# Patient Record
Sex: MALE | Race: WHITE | Employment: OTHER | URBAN - METROPOLITAN AREA
[De-identification: names, ages, dates, MRNs, and addresses within clinical notes are randomized per-mention and may not be internally consistent; named-entity substitution may affect disease eponyms.]

---

## 2023-09-19 ENCOUNTER — HOSPITAL ENCOUNTER (EMERGENCY)
Age: 73
Discharge: ANOTHER HOSPITAL | End: 2023-09-19

## 2023-09-19 ENCOUNTER — HOSPITAL ENCOUNTER (EMERGENCY)
Age: 73
Discharge: HOME | End: 2023-09-20
Attending: EMERGENCY MEDICINE
Payer: MEDICARE

## 2023-09-19 ENCOUNTER — APPOINTMENT (OUTPATIENT)
Dept: CT IMAGING | Age: 73
End: 2023-09-19
Payer: MEDICARE

## 2023-09-19 ENCOUNTER — APPOINTMENT (OUTPATIENT)
Dept: GENERAL RADIOLOGY | Age: 73
End: 2023-09-19
Payer: MEDICARE

## 2023-09-19 VITALS
TEMPERATURE: 97.8 F | RESPIRATION RATE: 16 BRPM | DIASTOLIC BLOOD PRESSURE: 90 MMHG | HEART RATE: 80 BPM | OXYGEN SATURATION: 95 % | SYSTOLIC BLOOD PRESSURE: 137 MMHG

## 2023-09-19 VITALS
BODY MASS INDEX: 29.16 KG/M2 | RESPIRATION RATE: 20 BRPM | SYSTOLIC BLOOD PRESSURE: 156 MMHG | HEART RATE: 74 BPM | TEMPERATURE: 97.9 F | DIASTOLIC BLOOD PRESSURE: 81 MMHG | WEIGHT: 220 LBS | HEIGHT: 73 IN | OXYGEN SATURATION: 100 %

## 2023-09-19 DIAGNOSIS — R59.1 LYMPHADENOPATHY: ICD-10-CM

## 2023-09-19 DIAGNOSIS — S09.90XA INJURY OF HEAD, INITIAL ENCOUNTER: Primary | ICD-10-CM

## 2023-09-19 DIAGNOSIS — S01.01XA LACERATION OF SCALP, INITIAL ENCOUNTER: ICD-10-CM

## 2023-09-19 LAB
ALBUMIN SERPL-MCNC: 4.4 G/DL (ref 3.5–5.2)
ALP SERPL-CCNC: 84 U/L (ref 40–129)
ALT SERPL-CCNC: 26 U/L (ref 0–40)
ANION GAP SERPL CALCULATED.3IONS-SCNC: 13 MMOL/L (ref 7–16)
AST SERPL-CCNC: 22 U/L (ref 0–39)
BASOPHILS # BLD: 0.03 K/UL (ref 0–0.2)
BASOPHILS NFR BLD: 0 % (ref 0–2)
BILIRUB SERPL-MCNC: 0.6 MG/DL (ref 0–1.2)
BUN SERPL-MCNC: 20 MG/DL (ref 6–23)
CALCIUM SERPL-MCNC: 8.8 MG/DL (ref 8.6–10.2)
CHLORIDE SERPL-SCNC: 107 MMOL/L (ref 98–107)
CO2 SERPL-SCNC: 22 MMOL/L (ref 22–29)
CREAT SERPL-MCNC: 1.3 MG/DL (ref 0.7–1.2)
EOSINOPHIL # BLD: 0.06 K/UL (ref 0.05–0.5)
EOSINOPHILS RELATIVE PERCENT: 1 % (ref 0–6)
ERYTHROCYTE [DISTWIDTH] IN BLOOD BY AUTOMATED COUNT: 13.4 % (ref 11.5–15)
GFR SERPL CREATININE-BSD FRML MDRD: 56 ML/MIN/1.73M2
GLUCOSE SERPL-MCNC: 235 MG/DL (ref 74–99)
HCT VFR BLD AUTO: 41.3 % (ref 37–54)
HGB BLD-MCNC: 14.1 G/DL (ref 12.5–16.5)
IMM GRANULOCYTES # BLD AUTO: 0.07 K/UL (ref 0–0.58)
IMM GRANULOCYTES NFR BLD: 1 % (ref 0–5)
INR PPP: 1
LYMPHOCYTES NFR BLD: 0.77 K/UL (ref 1.5–4)
LYMPHOCYTES RELATIVE PERCENT: 8 % (ref 20–42)
MCH RBC QN AUTO: 28.8 PG (ref 26–35)
MCHC RBC AUTO-ENTMCNC: 34.1 G/DL (ref 32–34.5)
MCV RBC AUTO: 84.3 FL (ref 80–99.9)
MONOCYTES NFR BLD: 0.6 K/UL (ref 0.1–0.95)
MONOCYTES NFR BLD: 7 % (ref 2–12)
NEUTROPHILS NFR BLD: 83 % (ref 43–80)
NEUTS SEG NFR BLD: 7.7 K/UL (ref 1.8–7.3)
PLATELET # BLD AUTO: 174 K/UL (ref 130–450)
PMV BLD AUTO: 10.1 FL (ref 7–12)
POTASSIUM SERPL-SCNC: 3.9 MMOL/L (ref 3.5–5)
PROT SERPL-MCNC: 6.7 G/DL (ref 6.4–8.3)
PROTHROMBIN TIME: 11.3 SEC (ref 9.3–12.4)
RBC # BLD AUTO: 4.9 M/UL (ref 3.8–5.8)
SODIUM SERPL-SCNC: 142 MMOL/L (ref 132–146)
WBC OTHER # BLD: 9.2 K/UL (ref 4.5–11.5)

## 2023-09-19 PROCEDURE — 2580000003 HC RX 258

## 2023-09-19 PROCEDURE — 73030 X-RAY EXAM OF SHOULDER: CPT

## 2023-09-19 PROCEDURE — 96374 THER/PROPH/DIAG INJ IV PUSH: CPT

## 2023-09-19 PROCEDURE — 70450 CT HEAD/BRAIN W/O DYE: CPT

## 2023-09-19 PROCEDURE — 6360000002 HC RX W HCPCS

## 2023-09-19 PROCEDURE — 80053 COMPREHEN METABOLIC PANEL: CPT

## 2023-09-19 PROCEDURE — 85025 COMPLETE CBC W/AUTO DIFF WBC: CPT

## 2023-09-19 PROCEDURE — 90471 IMMUNIZATION ADMIN: CPT

## 2023-09-19 PROCEDURE — 6360000004 HC RX CONTRAST MEDICATION: Performed by: RADIOLOGY

## 2023-09-19 PROCEDURE — 2500000003 HC RX 250 WO HCPCS

## 2023-09-19 PROCEDURE — 72128 CT CHEST SPINE W/O DYE: CPT

## 2023-09-19 PROCEDURE — 71260 CT THORAX DX C+: CPT

## 2023-09-19 PROCEDURE — 90714 TD VACC NO PRESV 7 YRS+ IM: CPT

## 2023-09-19 PROCEDURE — 72131 CT LUMBAR SPINE W/O DYE: CPT

## 2023-09-19 PROCEDURE — 85610 PROTHROMBIN TIME: CPT

## 2023-09-19 PROCEDURE — 99285 EMERGENCY DEPT VISIT HI MDM: CPT

## 2023-09-19 PROCEDURE — 4500000002 HC ER NO CHARGE

## 2023-09-19 PROCEDURE — 96375 TX/PRO/DX INJ NEW DRUG ADDON: CPT

## 2023-09-19 PROCEDURE — 12032 INTMD RPR S/A/T/EXT 2.6-7.5: CPT

## 2023-09-19 PROCEDURE — 6360000002 HC RX W HCPCS: Performed by: EMERGENCY MEDICINE

## 2023-09-19 PROCEDURE — 6370000000 HC RX 637 (ALT 250 FOR IP)

## 2023-09-19 PROCEDURE — 72125 CT NECK SPINE W/O DYE: CPT

## 2023-09-19 RX ORDER — LIDOCAINE HYDROCHLORIDE AND EPINEPHRINE 10; 10 MG/ML; UG/ML
20 INJECTION, SOLUTION INFILTRATION; PERINEURAL ONCE
Status: COMPLETED | OUTPATIENT
Start: 2023-09-19 | End: 2023-09-19

## 2023-09-19 RX ORDER — CEFDINIR 300 MG/1
300 CAPSULE ORAL 2 TIMES DAILY
Qty: 14 CAPSULE | Refills: 0 | Status: SHIPPED | OUTPATIENT
Start: 2023-09-19 | End: 2023-09-26

## 2023-09-19 RX ORDER — OXYCODONE HYDROCHLORIDE AND ACETAMINOPHEN 5; 325 MG/1; MG/1
1 TABLET ORAL ONCE
Status: COMPLETED | OUTPATIENT
Start: 2023-09-19 | End: 2023-09-19

## 2023-09-19 RX ORDER — ONDANSETRON 4 MG/1
4 TABLET, ORALLY DISINTEGRATING ORAL 3 TIMES DAILY PRN
Qty: 21 TABLET | Refills: 0 | Status: SHIPPED | OUTPATIENT
Start: 2023-09-19

## 2023-09-19 RX ORDER — LIDOCAINE HYDROCHLORIDE 10 MG/ML
20 INJECTION, SOLUTION INFILTRATION; PERINEURAL ONCE
Status: DISCONTINUED | OUTPATIENT
Start: 2023-09-19 | End: 2023-09-20 | Stop reason: HOSPADM

## 2023-09-19 RX ORDER — FENTANYL CITRATE 0.05 MG/ML
50 INJECTION, SOLUTION INTRAMUSCULAR; INTRAVENOUS ONCE
Status: COMPLETED | OUTPATIENT
Start: 2023-09-19 | End: 2023-09-19

## 2023-09-19 RX ORDER — MORPHINE SULFATE 4 MG/ML
4 INJECTION, SOLUTION INTRAMUSCULAR; INTRAVENOUS
Status: COMPLETED | OUTPATIENT
Start: 2023-09-19 | End: 2023-09-19

## 2023-09-19 RX ORDER — ACETAMINOPHEN 500 MG
1000 TABLET ORAL
Status: COMPLETED | OUTPATIENT
Start: 2023-09-19 | End: 2023-09-19

## 2023-09-19 RX ORDER — OXYCODONE HYDROCHLORIDE AND ACETAMINOPHEN 5; 325 MG/1; MG/1
1 TABLET ORAL EVERY 6 HOURS PRN
Qty: 12 TABLET | Refills: 0 | Status: SHIPPED | OUTPATIENT
Start: 2023-09-19 | End: 2023-09-22

## 2023-09-19 RX ADMIN — FENTANYL CITRATE 50 MCG: 0.05 INJECTION, SOLUTION INTRAMUSCULAR; INTRAVENOUS at 16:15

## 2023-09-19 RX ADMIN — CLOSTRIDIUM TETANI TOXOID ANTIGEN (FORMALDEHYDE INACTIVATED) AND CORYNEBACTERIUM DIPHTHERIAE TOXOID ANTIGEN (FORMALDEHYDE INACTIVATED) 0.5 ML: 5; 2 INJECTION, SUSPENSION INTRAMUSCULAR at 16:16

## 2023-09-19 RX ADMIN — MORPHINE SULFATE 4 MG: 4 INJECTION, SOLUTION INTRAMUSCULAR; INTRAVENOUS at 17:32

## 2023-09-19 RX ADMIN — IOPAMIDOL 75 ML: 755 INJECTION, SOLUTION INTRAVENOUS at 17:53

## 2023-09-19 RX ADMIN — OXYCODONE AND ACETAMINOPHEN 1 TABLET: 5; 325 TABLET ORAL at 19:13

## 2023-09-19 RX ADMIN — LIDOCAINE HYDROCHLORIDE,EPINEPHRINE BITARTRATE 20 ML: 10; .01 INJECTION, SOLUTION INFILTRATION; PERINEURAL at 19:14

## 2023-09-19 RX ADMIN — WATER 2000 MG: 1 INJECTION INTRAMUSCULAR; INTRAVENOUS; SUBCUTANEOUS at 22:54

## 2023-09-19 RX ADMIN — ACETAMINOPHEN 1000 MG: 500 TABLET ORAL at 19:13

## 2023-09-19 ASSESSMENT — PAIN SCALES - GENERAL
PAINLEVEL_OUTOF10: 7

## 2023-09-19 ASSESSMENT — PAIN DESCRIPTION - ORIENTATION
ORIENTATION: LEFT
ORIENTATION: LEFT

## 2023-09-19 ASSESSMENT — PAIN DESCRIPTION - LOCATION
LOCATION: HEAD;SHOULDER
LOCATION: SHOULDER;HEAD

## 2023-09-19 ASSESSMENT — PAIN - FUNCTIONAL ASSESSMENT: PAIN_FUNCTIONAL_ASSESSMENT: 0-10

## 2023-09-19 NOTE — ED NOTES
Evaluated by NP at reception desk, [phone] called for transport to hospital.     Augie Batista LPN  11/75/42 0230

## 2023-09-19 NOTE — ED PROVIDER NOTES
Arrived by private vehicle he said he was lifting up a building with a machine and the building came down and hit him on the left side of the head he has a laceration on the left side of the head with active bleeding. He said he has a headache in that side. He said the building was about 8 feet up in the air and it came down and hit him on the side of the head he said the roof hit him. Nikhil Better he is also having pain in the left shoulder. He said that he does not have any neck pain. This happened about 30 minutes ago. I did evaluate him in the room with this type of trauma I did tell the patient that he is going to need scanned and further evaluation that what we can do here in urgent care. He is agreeable to ambulance transfer. Patient was transferred via EMS to [facility] emergency department for evaluation. Did call the ED attending Dr. Conchita Calle and told him about this patient being transferred from here to ED.      Jaylan Meth, APRN - CNP  09/19/23 0024 [facility]  09/19/23 7503

## 2023-09-19 NOTE — ED NOTES
Name: Lilia Santos  : 1950  MRN: [id number]    Date:  23    Time: 4:33 PM EDT    Benefits of immediately proceeding with Radiology exam(s) without pre-testing outweigh the risks or are not indicated as specified below and therefore the following is/are being waived:    [] Pregnancy test   [] Patients LMP on-time and regular.   [] Patient had Tubal Ligation or has other Contraception Device. [] Patient  is Menopausal or Premenarcheal.    [] Patient had Full or Partial Hysterectomy. [] Protocol for Iodine allergy    [] MRI Questionnaire     [x] BUN/Creatinine   [] Patient age w/no hx of renal dysfunction. [] Patient on Dialysis. [] Recent Normal Labs.   Electronically signed by Dandre Irwin DO on 23 at 4:33 PM EDT               Dandre Irwin DO  [phone]

## 2023-09-20 NOTE — ED PROVIDER NOTES
[address]        Pt Name: Leeanne Osullivan  MRN: [id number]  [address] [facility] [place] 1950  Date of evaluation: 9/19/2023  Provider: Hua Stone DO  PCP: No primary care provider on file. Note Started: 10:10 PM EDT 9/19/23    CHIEF COMPLAINT       Chief Complaint   Patient presents with    Laceration     Laceration to top of head, pt had piece of wooden roof fall on head, bleeding controlled at traige, A+Ox4, denies LOC, denies thinners, c-collar in place       HISTORY OF PRESENT ILLNESS: 1 or more Elements        Limitations to history : None    Leeanne Osullivan is a 68 y.o. male who presents for evaluation after a head injury. He states a piece of a wooden roof fell and struck him in the head. Denies LOC. Denies anticoagulants. He initially drove himself to urgent care but they were concerned about his lacerations they sent him here by ambulance for further evaluation. He was placed in a c-collar by EMS. He does complain of left shoulder pain. Unknown last tetanus. Nursing Notes were all reviewed and agreed with or any disagreements were addressed in the HPI. REVIEW OF EXTERNAL NOTE :       Reviewed urgent care note from today. Chart Review/External Note Review    Last Echo reviewed by Me:  No results found for: \"LVEF\", \"LVEFMODE\"          Controlled Substance Monitoring:    Acute and Chronic Pain Monitoring:        No data to display                    REVIEW OF SYSTEMS :      Positives and Pertinent negatives as per HPI. SURGICAL HISTORY     Past Surgical History:   Procedure Laterality Date    COLONOSCOPY      COLONOSCOPY  3/21/13    COLONOSCOPY  04/27/2017    2 polyps  Dr Mary Wolf    ENDOSCOPY, COLON, DIAGNOSTIC      EYE SURGERY Bilateral 2012 / 2013?     cataract extraction w lens implants    TOTAL KNEE ARTHROPLASTY Left 09/07/2016    TOTAL KNEE ARTHROPLASTY Right 12/02/2016       CURRENTMEDICATIONS

## 2023-09-20 NOTE — DISCHARGE INSTRUCTIONS
DISCHARGE INSTRUCTIONS  Call Dr. Naila Hendrickson up your medication tomorrow     Even though you have been discharged from the Emergency Department, there are several things that you should do to ensure that you receive proper care:    1. DO READ your discharge instructions as these contain important information concerning your medical care. 2. If medication has been prescribed for your condition, fill the prescription as soon as possible and follow the directions on the medication. 3. RETURN AT ONCE TO THE EMERGENCY DEPARTMENT if you have any problems or concerns. These include but are not limited to fever, worsening pain(belly, chest, head, etc...), worsening shortness of breath, uncontrollable bleeding, inability to tolerate food and water, or any condition that makes you question your well-being. Also, if your symptoms do not improve in the next 12-24 hours, return to the ER or seek medical care immediately. 4. Be sure to follow up with your regular physician or specialist as instructed at discharge as this is the best way to ensure that you receive the very best of care. If you do not have a primary care physician, please contact a physician group and make an appointment. 5. Please visit [employer] for coupons regarding your prescriptions. It is a free service for you to use and can help reduce the cost of your medication.     We would like to thank you for coming today and our hope is that we served you and your family well during your stay

## 2023-09-29 ENCOUNTER — OFFICE VISIT (OUTPATIENT)
Dept: SURGERY | Age: 73
End: 2023-09-29
Payer: MEDICARE

## 2023-09-29 VITALS
BODY MASS INDEX: 29.16 KG/M2 | DIASTOLIC BLOOD PRESSURE: 80 MMHG | HEIGHT: 73 IN | RESPIRATION RATE: 16 BRPM | SYSTOLIC BLOOD PRESSURE: 167 MMHG | HEART RATE: 75 BPM | WEIGHT: 220 LBS

## 2023-09-29 DIAGNOSIS — S09.90XA TRAUMATIC INJURY OF HEAD, INITIAL ENCOUNTER: Primary | ICD-10-CM

## 2023-09-29 DIAGNOSIS — S01.01XD LACERATION OF SCALP, SUBSEQUENT ENCOUNTER: ICD-10-CM

## 2023-09-29 PROCEDURE — 3017F COLORECTAL CA SCREEN DOC REV: CPT | Performed by: NURSE PRACTITIONER

## 2023-09-29 PROCEDURE — 1036F TOBACCO NON-USER: CPT | Performed by: NURSE PRACTITIONER

## 2023-09-29 PROCEDURE — G8427 DOCREV CUR MEDS BY ELIG CLIN: HCPCS | Performed by: NURSE PRACTITIONER

## 2023-09-29 PROCEDURE — 3077F SYST BP >= 140 MM HG: CPT | Performed by: NURSE PRACTITIONER

## 2023-09-29 PROCEDURE — 3079F DIAST BP 80-89 MM HG: CPT | Performed by: NURSE PRACTITIONER

## 2023-09-29 PROCEDURE — 1123F ACP DISCUSS/DSCN MKR DOCD: CPT | Performed by: NURSE PRACTITIONER

## 2023-09-29 PROCEDURE — G8419 CALC BMI OUT NRM PARAM NOF/U: HCPCS | Performed by: NURSE PRACTITIONER

## 2023-09-29 PROCEDURE — 99213 OFFICE O/P EST LOW 20 MIN: CPT | Performed by: NURSE PRACTITIONER

## 2023-09-29 RX ORDER — ROSUVASTATIN CALCIUM 20 MG/1
TABLET, COATED ORAL
COMMUNITY
Start: 2022-10-14

## 2023-09-29 RX ORDER — ASPIRIN 81 MG/1
TABLET ORAL
COMMUNITY

## 2023-09-29 NOTE — PATIENT INSTRUCTIONS
a volunteer Crisis Counselor   Call [phone] for the Ehsan Maier  Call 2-677-953-TALK ([phone]) - [place] National Suicide Prevention Lifeline  Or go to your nearest emergency department  Methods for Coping  It is very important to take care of yourself during this time. Some things that you can do to help cope with trauma include:  Taking care of yourself - Eat well-balanced meals, get enough sleep, take care of personal hygiene, exercising regularly. Connect with others - feelings of isolation can be very common after this type of event. Stay in contact with others in any way you can - in-person, phone calls, video chats, or text messages. Engage in something you enjoy - walking, exercise, art, music, etc... Practice deep breathing (4 seconds in, 4 seconds out) or taking breaks when feeling overwhelmed. Setting realistic goals and seeing them through to completion. Spending time with people you trust and educating them about your experience and ways in which they can be supportive. If thoughts or symptoms are affecting your life in a negative way and coping tools are not helping, consider seeking help from a mental health professional.  When to Seek Help  If any of the previously mentioned symptoms become too intense to handle or are not getting better after 2-4 weeks  If you have no one you can share your feelings with  If you are using drugs or alcohol to cope  Who to Contact  If you are already established with a mental health provider, please follow up with them as soon as possible. If you do not have a provider established, you can make the practitioner aware of any of these feelings or symptoms at your Trauma Clinic follow up appointment. They can help you with next steps.    [address] [place] [address]  [phone] OR  [phone] x Option

## 2023-09-29 NOTE — PROGRESS NOTES
LPN removed sutures from patients scalp laceration without difficulty. Patient tolerated procedure well.

## 2024-04-09 PROBLEM — M25.569 JOINT PAIN, KNEE: Status: ACTIVE | Noted: 2024-04-09

## 2024-04-09 RX ORDER — GLIPIZIDE 5 MG/1
5 TABLET ORAL
COMMUNITY
Start: 2016-02-12 | End: 2024-06-05 | Stop reason: WASHOUT

## 2024-04-09 RX ORDER — ASPIRIN 325 MG
325 TABLET, DELAYED RELEASE (ENTERIC COATED) ORAL DAILY
COMMUNITY
Start: 2016-02-12 | End: 2024-04-10 | Stop reason: WASHOUT

## 2024-04-09 RX ORDER — AMLODIPINE BESYLATE 10 MG/1
10 TABLET ORAL DAILY
COMMUNITY
Start: 2016-02-12

## 2024-04-09 RX ORDER — METFORMIN HYDROCHLORIDE 500 MG/1
500 TABLET, EXTENDED RELEASE ORAL
COMMUNITY
Start: 2016-02-12 | End: 2024-04-10 | Stop reason: WASHOUT

## 2024-04-09 RX ORDER — LISINOPRIL 10 MG/1
10 TABLET ORAL DAILY
COMMUNITY
Start: 2016-02-12 | End: 2024-06-05 | Stop reason: WASHOUT

## 2024-04-09 RX ORDER — ATORVASTATIN CALCIUM 10 MG/1
10 TABLET, FILM COATED ORAL DAILY
COMMUNITY
Start: 2016-02-12 | End: 2024-04-10 | Stop reason: WASHOUT

## 2024-04-09 NOTE — PROGRESS NOTES
Referral from Dr. Lala. Umair comes to discuss treatment recommendations for aortic valve stenosis. Alejandra Stevens RN    This is a 74 years old male patient who was diagnosed with severe valve stenosis, he was put through the preoperative TAVR workup and he was found with a triple-vessel coronary disease.  So he was regarding to surgery of AVR plus revascularization.  He is here today with a CD that I have not been able to see so we can assume that he had a three-vessel coronary disease as he was described on the report.  We can upload the images to have a better understanding and evaluation before the operation.  The patient is here today along with his wife, we talked about the operation, risk and benefits and all that it is related to the operation procedure.  He is willing to proceed, with benefit is the necessary preoperative workup and then we will schedule him for surgery.  I have explained the risk of dying, stroke and other medical application like around 1%.  The risk of the pacemaker was explained around 3 to 5%.

## 2024-04-10 ENCOUNTER — OFFICE VISIT (OUTPATIENT)
Dept: CARDIAC SURGERY | Facility: HOSPITAL | Age: 74
End: 2024-04-10
Payer: MEDICARE

## 2024-04-10 VITALS
WEIGHT: 201 LBS | BODY MASS INDEX: 26.64 KG/M2 | HEIGHT: 73 IN | HEART RATE: 71 BPM | DIASTOLIC BLOOD PRESSURE: 76 MMHG | OXYGEN SATURATION: 97 % | SYSTOLIC BLOOD PRESSURE: 133 MMHG

## 2024-04-10 DIAGNOSIS — I35.0 AORTIC VALVE STENOSIS, ETIOLOGY OF CARDIAC VALVE DISEASE UNSPECIFIED: ICD-10-CM

## 2024-04-10 PROCEDURE — 99215 OFFICE O/P EST HI 40 MIN: CPT | Performed by: THORACIC SURGERY (CARDIOTHORACIC VASCULAR SURGERY)

## 2024-04-10 PROCEDURE — 99205 OFFICE O/P NEW HI 60 MIN: CPT | Performed by: THORACIC SURGERY (CARDIOTHORACIC VASCULAR SURGERY)

## 2024-04-10 RX ORDER — ROSUVASTATIN CALCIUM 20 MG/1
1 TABLET, COATED ORAL DAILY
COMMUNITY
Start: 2022-10-14

## 2024-04-10 RX ORDER — POTASSIUM CHLORIDE 1500 MG/1
1 TABLET, EXTENDED RELEASE ORAL 2 TIMES DAILY
COMMUNITY
Start: 2024-03-25

## 2024-04-10 RX ORDER — ACETAMINOPHEN 500 MG
2000 TABLET ORAL DAILY
COMMUNITY

## 2024-04-10 RX ORDER — ASPIRIN 81 MG/1
81 TABLET ORAL DAILY
COMMUNITY

## 2024-04-10 RX ORDER — ICOSAPENT ETHYL 1 G/1
CAPSULE ORAL
COMMUNITY
Start: 2024-03-25

## 2024-04-10 RX ORDER — METFORMIN HYDROCHLORIDE 1000 MG/1
TABLET ORAL
COMMUNITY
Start: 2024-01-08

## 2024-04-10 RX ORDER — GLIMEPIRIDE 2 MG/1
TABLET ORAL
COMMUNITY
Start: 2024-03-25

## 2024-04-10 RX ORDER — LANOLIN ALCOHOL/MO/W.PET/CERES
1 CREAM (GRAM) TOPICAL DAILY
COMMUNITY

## 2024-04-10 RX ORDER — EMPAGLIFLOZIN 25 MG/1
25 TABLET, FILM COATED ORAL DAILY
COMMUNITY
Start: 2023-04-24

## 2024-04-10 RX ORDER — ORAL SEMAGLUTIDE 14 MG/1
TABLET ORAL
COMMUNITY

## 2024-04-10 RX ORDER — INSULIN DEGLUDEC 100 U/ML
INJECTION, SOLUTION SUBCUTANEOUS
COMMUNITY
Start: 2024-01-08 | End: 2024-06-05 | Stop reason: WASHOUT

## 2024-04-10 RX ORDER — FENOFIBRATE 145 MG/1
145 TABLET, FILM COATED ORAL DAILY
COMMUNITY
Start: 2023-12-21

## 2024-04-10 RX ORDER — LISINOPRIL AND HYDROCHLOROTHIAZIDE 20; 25 MG/1; MG/1
1 TABLET ORAL NIGHTLY
COMMUNITY

## 2024-04-10 RX ORDER — HYDRALAZINE HYDROCHLORIDE 50 MG/1
50 TABLET, FILM COATED ORAL 3 TIMES DAILY
COMMUNITY
Start: 2024-03-25

## 2024-04-11 DIAGNOSIS — I35.0 AORTIC VALVE STENOSIS, ETIOLOGY OF CARDIAC VALVE DISEASE UNSPECIFIED: ICD-10-CM

## 2024-04-12 DIAGNOSIS — I35.0 NONRHEUMATIC AORTIC VALVE STENOSIS: Primary | ICD-10-CM

## 2024-04-12 DIAGNOSIS — I35.9 AORTIC VALVE DISEASE: Primary | ICD-10-CM

## 2024-04-16 ENCOUNTER — HOSPITAL ENCOUNTER (OUTPATIENT)
Dept: CARDIOLOGY | Facility: HOSPITAL | Age: 74
Discharge: HOME | End: 2024-04-16
Payer: MEDICARE

## 2024-04-16 DIAGNOSIS — I06.8 OTHER RHEUMATIC AORTIC VALVE DISEASES: ICD-10-CM

## 2024-04-16 DIAGNOSIS — I35.0 AORTIC VALVE STENOSIS, ETIOLOGY OF CARDIAC VALVE DISEASE UNSPECIFIED: ICD-10-CM

## 2024-04-16 PROCEDURE — 93306 TTE W/DOPPLER COMPLETE: CPT | Performed by: INTERNAL MEDICINE

## 2024-04-16 PROCEDURE — 93306 TTE W/DOPPLER COMPLETE: CPT

## 2024-04-17 LAB
AORTIC VALVE MEAN GRADIENT: 17.2 MMHG
AORTIC VALVE PEAK VELOCITY: 2.81 M/S
AV PEAK GRADIENT: 31.6 MMHG
AVA (PEAK VEL): 1.33 CM2
AVA (VTI): 1.4 CM2
EJECTION FRACTION APICAL 4 CHAMBER: 45.2
LEFT ATRIUM VOLUME AREA LENGTH INDEX BSA: 42 ML/M2
LEFT VENTRICLE INTERNAL DIMENSION DIASTOLE: 5.72 CM (ref 3.5–6)
LEFT VENTRICULAR OUTFLOW TRACT DIAMETER: 2.49 CM
LV EJECTION FRACTION BIPLANE: 46 %
MITRAL VALVE E/A RATIO: 0.61
MITRAL VALVE E/E' RATIO: 28.22
RIGHT VENTRICLE PEAK SYSTOLIC PRESSURE: 44.1 MMHG
TRICUSPID ANNULAR PLANE SYSTOLIC EXCURSION: 1.2 CM

## 2024-05-28 ENCOUNTER — CLINICAL SUPPORT (OUTPATIENT)
Dept: PREADMISSION TESTING | Facility: HOSPITAL | Age: 74
End: 2024-05-28
Payer: MEDICARE

## 2024-05-28 NOTE — CPM/PAT NURSE NOTE
CPM/PAT Nurse Note      Name: Umair Carney (Umair Carney)  /Age: 1950/74 y.o.       Past Medical History:   Diagnosis Date    Coronary artery disease     Hyperlipidemia     Hypertension     Prostate cancer (Multi)     s/p radiation    Skin cancer     removed    Type 2 diabetes mellitus (Multi)        Past Surgical History:   Procedure Laterality Date    CARDIAC CATHETERIZATION      CATARACT EXTRACTION      COLONOSCOPY      KNEE ARTHROPLASTY Bilateral 2016       Patient  has no history on file for sexual activity.    No family history on file.    No Known Allergies    Prior to Admission medications    Medication Sig Start Date End Date Taking? Authorizing Provider   amLODIPine (Norvasc) 10 mg tablet Take 1 tablet (10 mg) by mouth once daily. 16   Historical Provider, MD   aspirin 81 mg EC tablet Take 1 tablet (81 mg) by mouth once daily.    Historical Provider, MD   cholecalciferol (Vitamin D-3) 50 mcg (2,000 unit) capsule Take 1 capsule (50 mcg) by mouth early in the morning..    Historical Provider, MD   cyanocobalamin (Vitamin B-12) 1,000 mcg tablet Take 1 tablet (1,000 mcg) by mouth once daily.    Historical Provider, MD   fenofibrate (Tricor) 145 mg tablet Take 1 tablet (145 mg) by mouth once daily. 23   Historical Provider, MD   glimepiride (Amaryl) 2 mg tablet TAKE 1 TABLET BY MOUTH BEFORE BREAKFAST AND 1 TABLET BEFORE DINNER 3/25/24   Historical Provider, MD   glipiZIDE (Glucotrol) 5 mg tablet Take 1 tablet (5 mg) by mouth 2 times a day before meals. 16   Historical Provider, MD   hydrALAZINE (Apresoline) 50 mg tablet Take 1 tablet (50 mg) by mouth 3 times a day. 3/25/24   Historical Provider, MD   icosapent ethyL (Vascepa) 1 gram capsule TAKE TWO CAPSULES BY MOUTH TWO TIMES A DAY AFTER MEALS 3/25/24   Historical Provider, MD   Jardiance 25 mg Take 1 tablet (25 mg) by mouth once daily. 23   Historical Provider, MD   lisinopril 10 mg tablet Take 1 tablet (10 mg) by mouth once  daily. 2/12/16   Historical Provider, MD   lisinopriL-hydrochlorothiazide 20-25 mg tablet Take 1 tablet by mouth once daily at bedtime.    Historical Provider, MD   metFORMIN (Glucophage) 1,000 mg tablet TAKE ONE TABLET BY MOUTH AFTER LUNCH AND ONE TABLET AFTER DINNER 1/8/24   Historical Provider, MD   potassium chloride CR 20 mEq ER tablet Take 1 tablet (20 mEq) by mouth 2 times a day. 3/25/24   Historical Provider, MD   rosuvastatin (Crestor) 20 mg tablet Take 1 tablet (20 mg) by mouth once daily. 10/14/22   Historical Provider, MD   Rybelsus 14 mg tablet tablet TAKE 1 TABLET BY MOUTH ONCE DAILY ON AN EMPTY STOMACH. DO NOT EAT OR DRINK ANYTHING AFTER FOR 30 MINUTES    Historical Provider, MD   Tresiba FlexTouch U-100 100 unit/mL (3 mL) injection INJECT 20 UNITS INTO THE SKIN AT NIGHT 1/8/24   Historical Provider, MD CARLY FALLON     DASI Risk Score    No data to display       Caprini DVT Assessment    No data to display       Modified Frailty Index    No data to display       CHADS2 Stroke Risk  Current as of 37 minutes ago        N/A 3 to 100%: High Risk   2 to < 3%: Medium Risk   0 to < 2%: Low Risk     Last Change: N/A          This score determines the patient's risk of having a stroke if the patient has atrial fibrillation.        This score is not applicable to this patient. Components are not calculated.          Revised Cardiac Risk Index    No data to display       Apfel Simplified Score    No data to display       Risk Analysis Index Results This Encounter    No data found in the last 1 encounters.     Scheduled for replacement aortic valve creation bypass graft coronary artery on 06/21/24 with Dr. Durant.     PCP: Dr. Eugene Becerra @ [facility]  Phone: [phone], Fax: [phone]    Cardiac Surgery: Usman Durant MD LOV 04/10/24    -ECHO: 04/16/24  CONCLUSIONS:   1. Left ventricular systolic function is mildly decreased with a 45-50% estimated ejection fraction.   2. Spectral Doppler  shows an impaired relaxation pattern of left ventricular diastolic filling.   3. There is reduced right ventricular systolic function.   4. The left atrium is moderately dilated.   5. Mildly elevated RVSP.   6. Mild aortic valve stenosis.   7. The aortic valve appears tricuspid with restriction.   8. There is global hypokinesis of the left ventricle with minor regional variations.    Cardiology: Angi Lala MD  Phone: [phone], Fax: [phone]    Endocrinology: Dr. Kaye Escobar  Phone: [phone], Fax: [phone]    Pulmonology: Tyrel Lozano MD  Phone: [phone], Fax: [phone]    Nurse Plan of Action:     Requested office notes   ONLY    Tran Mehta RN  Pre-Admission Testing

## 2024-06-03 ENCOUNTER — APPOINTMENT (OUTPATIENT)
Dept: PREADMISSION TESTING | Facility: HOSPITAL | Age: 74
End: 2024-06-03
Payer: MEDICARE

## 2024-06-05 ENCOUNTER — DOCUMENTATION (OUTPATIENT)
Dept: RESEARCH | Age: 74
End: 2024-06-05
Payer: MEDICARE

## 2024-06-05 ENCOUNTER — PRE-ADMISSION TESTING (OUTPATIENT)
Dept: PREADMISSION TESTING | Facility: HOSPITAL | Age: 74
End: 2024-06-05
Payer: MEDICARE

## 2024-06-05 ENCOUNTER — HOSPITAL ENCOUNTER (OUTPATIENT)
Dept: RADIOLOGY | Facility: HOSPITAL | Age: 74
Discharge: HOME | End: 2024-06-05
Payer: MEDICARE

## 2024-06-05 VITALS
OXYGEN SATURATION: 97 % | TEMPERATURE: 97.6 F | RESPIRATION RATE: 18 BRPM | DIASTOLIC BLOOD PRESSURE: 76 MMHG | SYSTOLIC BLOOD PRESSURE: 146 MMHG | BODY MASS INDEX: 27.27 KG/M2 | HEART RATE: 73 BPM | WEIGHT: 206.7 LBS

## 2024-06-05 DIAGNOSIS — I35.9 AORTIC VALVE DISEASE: ICD-10-CM

## 2024-06-05 DIAGNOSIS — I35.0 NONRHEUMATIC AORTIC VALVE STENOSIS: ICD-10-CM

## 2024-06-05 DIAGNOSIS — R73.09 OTHER ABNORMAL GLUCOSE: ICD-10-CM

## 2024-06-05 DIAGNOSIS — R79.1 ABNORMAL COAGULATION PROFILE: ICD-10-CM

## 2024-06-05 DIAGNOSIS — Z01.818 PREOP EXAMINATION: ICD-10-CM

## 2024-06-05 DIAGNOSIS — Z00.6 RESEARCH STUDY PATIENT: ICD-10-CM

## 2024-06-05 DIAGNOSIS — Z01.818 PREOP EXAMINATION: Primary | ICD-10-CM

## 2024-06-05 LAB
ABO GROUP (TYPE) IN BLOOD: NORMAL
ALBUMIN SERPL BCP-MCNC: 4.2 G/DL (ref 3.4–5)
ALP SERPL-CCNC: 73 U/L (ref 33–136)
ALT SERPL W P-5'-P-CCNC: 25 U/L (ref 10–52)
ANION GAP SERPL CALC-SCNC: 17 MMOL/L (ref 10–20)
ANTIBODY SCREEN: NORMAL
APPEARANCE UR: CLEAR
APTT PPP: 32 SECONDS (ref 27–38)
AST SERPL W P-5'-P-CCNC: 19 U/L (ref 9–39)
BASOPHILS # BLD AUTO: 0.04 X10*3/UL (ref 0–0.1)
BASOPHILS NFR BLD AUTO: 0.5 %
BILIRUB DIRECT SERPL-MCNC: 0.1 MG/DL (ref 0–0.3)
BILIRUB SERPL-MCNC: 0.4 MG/DL (ref 0–1.2)
BILIRUB UR STRIP.AUTO-MCNC: NEGATIVE MG/DL
BUN SERPL-MCNC: 26 MG/DL (ref 6–23)
CALCIUM SERPL-MCNC: 9.5 MG/DL (ref 8.6–10.6)
CHLORIDE SERPL-SCNC: 104 MMOL/L (ref 98–107)
CO2 SERPL-SCNC: 26 MMOL/L (ref 21–32)
COLOR UR: ABNORMAL
CREAT SERPL-MCNC: 1.66 MG/DL (ref 0.5–1.3)
EGFRCR SERPLBLD CKD-EPI 2021: 43 ML/MIN/1.73M*2
EOSINOPHIL # BLD AUTO: 0.1 X10*3/UL (ref 0–0.4)
EOSINOPHIL NFR BLD AUTO: 1.3 %
ERYTHROCYTE [DISTWIDTH] IN BLOOD BY AUTOMATED COUNT: 13.4 % (ref 11.5–14.5)
ERYTHROCYTE [DISTWIDTH] IN BLOOD BY AUTOMATED COUNT: 13.4 % (ref 11.5–14.5)
EST. AVERAGE GLUCOSE BLD GHB EST-MCNC: 146 MG/DL
GLUCOSE SERPL-MCNC: 127 MG/DL (ref 74–99)
GLUCOSE UR STRIP.AUTO-MCNC: ABNORMAL MG/DL
HBA1C MFR BLD: 6.7 %
HCT VFR BLD AUTO: 46.2 % (ref 41–52)
HCT VFR BLD AUTO: 46.2 % (ref 41–52)
HGB BLD-MCNC: 15.9 G/DL (ref 13.5–17.5)
HGB BLD-MCNC: 15.9 G/DL (ref 13.5–17.5)
IMM GRANULOCYTES # BLD AUTO: 0.04 X10*3/UL (ref 0–0.5)
IMM GRANULOCYTES NFR BLD AUTO: 0.5 % (ref 0–0.9)
INR PPP: 1 (ref 0.9–1.1)
KETONES UR STRIP.AUTO-MCNC: NEGATIVE MG/DL
LEUKOCYTE ESTERASE UR QL STRIP.AUTO: NEGATIVE
LYMPHOCYTES # BLD AUTO: 1.29 X10*3/UL (ref 0.8–3)
LYMPHOCYTES NFR BLD AUTO: 16.5 %
MAGNESIUM SERPL-MCNC: 2.24 MG/DL (ref 1.6–2.4)
MCH RBC QN AUTO: 28.3 PG (ref 26–34)
MCH RBC QN AUTO: 28.3 PG (ref 26–34)
MCHC RBC AUTO-ENTMCNC: 34.4 G/DL (ref 32–36)
MCHC RBC AUTO-ENTMCNC: 34.4 G/DL (ref 32–36)
MCV RBC AUTO: 82 FL (ref 80–100)
MCV RBC AUTO: 82 FL (ref 80–100)
MONOCYTES # BLD AUTO: 0.64 X10*3/UL (ref 0.05–0.8)
MONOCYTES NFR BLD AUTO: 8.2 %
MUCOUS THREADS #/AREA URNS AUTO: NORMAL /LPF
NEUTROPHILS # BLD AUTO: 5.69 X10*3/UL (ref 1.6–5.5)
NEUTROPHILS NFR BLD AUTO: 73 %
NITRITE UR QL STRIP.AUTO: NEGATIVE
NRBC BLD-RTO: 0 /100 WBCS (ref 0–0)
NRBC BLD-RTO: 0 /100 WBCS (ref 0–0)
PH UR STRIP.AUTO: 5.5 [PH]
PLATELET # BLD AUTO: 226 X10*3/UL (ref 150–450)
PLATELET # BLD AUTO: 226 X10*3/UL (ref 150–450)
POTASSIUM SERPL-SCNC: 3.5 MMOL/L (ref 3.5–5.3)
PROT SERPL-MCNC: 6.9 G/DL (ref 6.4–8.2)
PROT UR STRIP.AUTO-MCNC: ABNORMAL MG/DL
PROTHROMBIN TIME: 10.9 SECONDS (ref 9.8–12.8)
RBC # BLD AUTO: 5.61 X10*6/UL (ref 4.5–5.9)
RBC # BLD AUTO: 5.61 X10*6/UL (ref 4.5–5.9)
RBC # UR STRIP.AUTO: ABNORMAL /UL
RBC #/AREA URNS AUTO: NORMAL /HPF
RH FACTOR (ANTIGEN D): NORMAL
SODIUM SERPL-SCNC: 143 MMOL/L (ref 136–145)
SP GR UR STRIP.AUTO: 1.02
UROBILINOGEN UR STRIP.AUTO-MCNC: NORMAL MG/DL
WBC # BLD AUTO: 7.8 X10*3/UL (ref 4.4–11.3)
WBC # BLD AUTO: 7.8 X10*3/UL (ref 4.4–11.3)
WBC #/AREA URNS AUTO: NORMAL /HPF

## 2024-06-05 PROCEDURE — 71046 X-RAY EXAM CHEST 2 VIEWS: CPT

## 2024-06-05 PROCEDURE — 86901 BLOOD TYPING SEROLOGIC RH(D): CPT | Performed by: THORACIC SURGERY (CARDIOTHORACIC VASCULAR SURGERY)

## 2024-06-05 PROCEDURE — 85610 PROTHROMBIN TIME: CPT | Performed by: NURSE PRACTITIONER

## 2024-06-05 PROCEDURE — 80076 HEPATIC FUNCTION PANEL: CPT | Performed by: THORACIC SURGERY (CARDIOTHORACIC VASCULAR SURGERY)

## 2024-06-05 PROCEDURE — 82565 ASSAY OF CREATININE: CPT | Performed by: THORACIC SURGERY (CARDIOTHORACIC VASCULAR SURGERY)

## 2024-06-05 PROCEDURE — 83036 HEMOGLOBIN GLYCOSYLATED A1C: CPT | Performed by: NURSE PRACTITIONER

## 2024-06-05 PROCEDURE — 85025 COMPLETE CBC W/AUTO DIFF WBC: CPT | Performed by: THORACIC SURGERY (CARDIOTHORACIC VASCULAR SURGERY)

## 2024-06-05 PROCEDURE — 83735 ASSAY OF MAGNESIUM: CPT | Performed by: THORACIC SURGERY (CARDIOTHORACIC VASCULAR SURGERY)

## 2024-06-05 PROCEDURE — 36415 COLL VENOUS BLD VENIPUNCTURE: CPT

## 2024-06-05 PROCEDURE — 81001 URINALYSIS AUTO W/SCOPE: CPT | Performed by: NURSE PRACTITIONER

## 2024-06-05 PROCEDURE — 87081 CULTURE SCREEN ONLY: CPT | Performed by: NURSE PRACTITIONER

## 2024-06-05 RX ORDER — CHLORHEXIDINE GLUCONATE ORAL RINSE 1.2 MG/ML
15 SOLUTION DENTAL SEE ADMIN INSTRUCTIONS
Qty: 473 ML | Refills: 0 | Status: SHIPPED | OUTPATIENT
Start: 2024-06-05 | End: 2024-06-27 | Stop reason: HOSPADM

## 2024-06-05 RX ORDER — METOPROLOL SUCCINATE 50 MG/1
50 TABLET, EXTENDED RELEASE ORAL NIGHTLY
COMMUNITY
Start: 2024-05-22 | End: 2024-06-27 | Stop reason: HOSPADM

## 2024-06-05 RX ORDER — CHLORHEXIDINE GLUCONATE 40 MG/ML
SOLUTION TOPICAL 2 TIMES DAILY
Qty: 473 ML | Refills: 0 | Status: SHIPPED | OUTPATIENT
Start: 2024-06-05 | End: 2024-06-10

## 2024-06-05 ASSESSMENT — DUKE ACTIVITY SCORE INDEX (DASI)
CAN YOU HAVE SEXUAL RELATIONS: YES
CAN YOU RUN A SHORT DISTANCE: YES
CAN YOU DO LIGHT WORK AROUND THE HOUSE LIKE DUSTING OR WASHING DISHES: YES
CAN YOU WALK A BLOCK OR TWO ON LEVEL GROUND: YES
CAN YOU CLIMB A FLIGHT OF STAIRS OR WALK UP A HILL: YES
CAN YOU DO HEAVY WORK AROUND THE HOUSE LIKE SCRUBBING FLOORS OR LIFTING AND MOVING HEAVY FURNITURE: YES
TOTAL_SCORE: 58.2
DASI METS SCORE: 9.9
CAN YOU DO YARD WORK LIKE RAKING LEAVES, WEEDING OR PUSHING A MOWER: YES
CAN YOU PARTICIPATE IN MODERATE RECREATIONAL ACTIVITIES LIKE GOLF, BOWLING, DANCING, DOUBLES TENNIS OR THROWING A BASEBALL OR FOOTBALL: YES
CAN YOU TAKE CARE OF YOURSELF (EAT, DRESS, BATHE, OR USE TOILET): YES
CAN YOU DO MODERATE WORK AROUND THE HOUSE LIKE VACUUMING, SWEEPING FLOORS OR CARRYING GROCERIES: YES
CAN YOU WALK INDOORS, SUCH AS AROUND YOUR HOUSE: YES
CAN YOU PARTICIPATE IN STRENOUS SPORTS LIKE SWIMMING, SINGLES TENNIS, FOOTBALL, BASKETBALL, OR SKIING: YES

## 2024-06-05 ASSESSMENT — ENCOUNTER SYMPTOMS
RESPIRATORY NEGATIVE: 1
NEUROLOGICAL NEGATIVE: 1
CARDIOVASCULAR NEGATIVE: 1
CHILLS: 0
ENDOCRINE NEGATIVE: 1
MUSCULOSKELETAL NEGATIVE: 1
NECK NEGATIVE: 1
EYES NEGATIVE: 1
CONSTITUTIONAL NEGATIVE: 1
FEVER: 0
GASTROINTESTINAL NEGATIVE: 1

## 2024-06-05 ASSESSMENT — LIFESTYLE VARIABLES: SMOKING_STATUS: NONSMOKER

## 2024-06-05 NOTE — PREPROCEDURE INSTRUCTIONS
Thank you for visiting [facility] ([facility]) today for your pre-procedure evaluation, you were seen by    Echo Turk, MSN, NP-C, CNP  Family Nurse Practitioner  Department of Anesthesiology and Perioperative Medicine  Main phone: [phone]  Fax :[phone]    **Please be sure to follow any guidance provided by your surgeon regarding foods, fluids and medications prior to surgery**    This summary includes instructions and information to aid you during your perioperative period.  Please read carefully. If you have any questions about your visit today, please call the number listed above.  If you become ill or have any changes to your health before your surgery, please contact your primary care provider and alert your surgeon.    Preparing for your Surgery       Exercises  Preoperative Deep Breathing Exercises  Why it is important to do deep breathing exercises before my surgery?  Deep breathing exercises strengthen your breathing muscles.  This helps you to recover after your surgery and decreases the chance of breathing complications.  How are the deep breathing exercises done?  Sit straight with your back supported.  Breathe in deeply and slowly through your nose. Your lower rib cage should expand and your abdomen may move forward.  Hold that breath for 3 to 5 seconds.  Breathe out through pursed lips, slowly and completely.  Rest and repeat 10 times every hour while awake.  Rest longer if you become dizzy or lightheaded.       Incentive Spirometer   You were provided with an incentive spirometer in [facility], please follow the below instructions.   You were not provided an incentive spirometer in [facility], please disregard the incentive spirometer instructions  What is an incentive spirometer?  An incentive spirometer is a device used before and after surgery to “exercise” your lungs.  It helps you to take deeper breaths to expand your lungs.  Below is an example of a basic incentive  spirometer.  The device you receive may differ slightly but they all function the same.    Why do I need to use an incentive spirometer?  Using your incentive spirometer prepares your lungs for surgery and helps prevent lung problems after surgery.  How do I use my incentive spirometer?  When you're using your incentive spirometer, make sure to breathe through your mouth. If you breathe through your nose, the incentive spirometer won't work properly. You can hold your nose if you have trouble.  If you feel dizzy at any time, stop and rest. Try again at a later time.  Follow the steps below:  Set up your incentive spirometer, expand the flexible tubing and connect to the outlet.  Sit upright in a chair or bed. Hold the incentive spirometer at eye level.   Put the mouthpiece in your mouth and close your lips tightly around it. Slowly breathe out (exhale) completely.  Breathe in (inhale) slowly through your mouth as deeply as you can. As you take a breath, you will see the piston rise inside the large column. While the piston rises, the indicator should move upwards. It should stay in between the 2 arrows (see Figure).  Try to get the piston as high as you can, while keeping the indicator between the arrows.   If the indicator doesn't stay between the arrows, you're breathing either too fast or too slow.  When you get it as high as you can, hold your breath for 10 seconds, or as long as possible. While you're holding your breath, the piston will slowly fall to the base of the spirometer.  Once the piston reaches the bottom of the spirometer, breathe out slowly through your mouth. Rest for a few seconds.  Repeat 10 times. Try to get the piston to the same level with each breath.  Repeat every hour while awake  You can carefully clean the outside of the mouthpiece with an alcohol wipe or soap and water.      Preoperative Brain Exercises    What are brain exercises?  A brain exercise is any activity that engages your  thinking (cognitive) skills.    What types of activities are considered brain exercises?  Jigsaw puzzles, crossword puzzles, word jumble, memory games, word search, and many more.  Many can be found free online or on your phone via a mobile sweta.    Why should I do brain exercises before my surgery?  More recent research has shown brain exercise before surgery can lower the risk of postoperative delirium (confusion) which can be especially important for older adults.  Patients who did brain exercises for 5 to 10 hours the days before surgery, cut their risk of postoperative delirium in half up to 1 week after surgery.      Instructions    Preoperative Fasting Guidelines    Why must I stop eating and drinking near surgery time?  With sedation, food or liquid in your stomach can enter your lungs causing serious complications  Food can increase nausea and vomiting  When do I need to stop eating and drinking before my surgery?      Do not eat any food or drink any liquids after midnight the night before your surgery/procedure.  You may have sips of water to take medications.        Simple things you can do to help prevent blood clots     Blood clots are blockages that can form in the body's veins. When a blood clot forms in your deep veins, it may be called a deep vein thrombosis, or DVT for short. Blood clots can happen in any part of the body where blood flows, but they are most common in the arms and legs. If a piece of a blood clot breaks free and travels to the lungs, it is called a pulmonary embolus (PE). A PE can be a very serious problem.         Being in the hospital or having surgery can raise your chances of getting a blood clot because you may not be well enough to move around as much as you normally do.         Ways you can help prevent blood clots in the hospital       Wearing SCDs  SCDs stands for Sequential Compression Devices.   SCDs are special sleeves that wrap around your legs. They attach to a pump  that fills them with air to gently squeeze your legs every few minutes.  This helps return the blood in your legs to your heart.   SCDs should only be taken off when walking or bathing. SCDs may not be comfortable, but they can help save your life.              Pump SCD leg sleeves  Wearing compression stockings - if your doctor orders them. These special snug-fitting stockings gently squeeze your legs to help blood flow.       Walking. Walking helps move the blood in your legs.   If your doctor says it is ok, try walking the halls at least   5 times a day. Ask us to help you get up, so you don't fall.      Taking any blood-thinning medicines your doctor orders.              Ways you can help prevent blood clots at home         Wearing compression stockings - if your doctor orders them.   Walking - to help move the blood in your legs.    Taking any blood-thinning medicines your doctor orders.      Signs of a blood clot or PE    Tell your doctor or nurse right away if you have any of the problems listed below.         If you are at home, seek medical care right away. Call 911 for chest pain or problems breathing.            Signs of a blood clot (DVT) - such as pain, swelling, redness, or warmth in your arm or legs.  Signs of a pulmonary embolism (PE) - such as chest pain or feeling short of breath      Tobacco and Alcohol;  Do not drink alcohol or smoke within 24 hours of surgery.  It is best to quit smoking for as long as possible before any surgery or procedure.      The Week before Surgery        Seven days before Surgery  Check your CPM medication instructions  Do the exercises provided to you by CPM   Arrange for a responsible, adult licensed  to take you home after surgery and stay with you for 24 hours.  You will not be permitted to drive yourself home if you have received any anesthetic/sedation  Six days before surgery  Check your CPM medication instructions  Do the exercises provided to you by CPM    Start using Chlorhexidene (CHG) body wash if prescribed  Five days before surgery  Check your CPM medication instructions  Do the exercises provided to you by CPM   Continue to use CHG body wash if prescribed  Three days before surgery  Check your CPM medication instructions  Do the exercises provided to you by CPM   Continue to use CHG body wash if prescribed  Two days before surgery  Check your CPM medication instructions  Do the exercises provided to you by CPM   Continue to use CHG body wash if prescribed    The Day before Surgery       Check your CPM medication and all other CPM instructions including when to stop eating and drinking  You will be called with your arrival time for surgery in the late afternoon.  If you do not receive a call please reach out to your surgeon's office.  Do not smoke or drink 24 hours before surgery  Prepare items to bring with you to the hospital  Shower with your chlorhexidine wash if prescribed  Brush your teeth and use your chlorhexidine dental rinse if prescribed    The Day of Surgery       Check your CPM medication instructions  Ensure you follow the instructions for when to stop eating and drinking  Shower, if prescribed use CHG.  Do not apply any lotions, creams, moisturizers, perfume or deodorant  Brush your teeth and use your CHG dental rinse if prescribed  Wear loose comfortable clothing  Avoid make-up  Remove  jewelry and piercings, consider professional piercing removal with a plastic spacer if needed  Bring photo ID and Insurance card  Bring an accurate medication list that includes medication dose, frequency and allergies  Bring a copy of your advanced directives (will, health care power of )  Bring any devices and controllers as well as medical devices you have been provided with for surgery (CPAP, slings, braces, etc.)  Dentures, eyeglasses, and contacts will be removed before surgery, please bring cases for contacts or glasses

## 2024-06-05 NOTE — RESEARCH NOTES
Met with Mr Carney and his wife today to discuss the Renibus study. ICF explained and all questions answered.  Informed consent obtained to both main study and the extension study. Plan for Mr Carney to come for the infusion on 6.19.2024.  Copy of ICF given to patient. Questionnaire completed per study protocol.

## 2024-06-05 NOTE — CPM/PAT H&P
CPM/PAT Evaluation       Name: Umair Carney (Umair Carney)  /Age: 1950/74 y.o.     Visit Type:   In-Person       Chief Complaint: Replacement Aortic Valve  Creation Bypass Graft Coronary Artery     HPI Patient is a 74 year old male, accompanied by significant other,  scheduled for surgery with Dr. Usman Durant on 24 related to Nonrheumatic aortic valve stenosis     Patient referred by Dr. Durant for preoperative evaluation of CAD, hypertension, hyperlipidemia, prostate cancer and diabetes.     Past Medical History:   Diagnosis Date    Coronary artery disease     Hyperlipidemia     Hypertension     Prostate cancer (Multi)     s/p radiation    Skin cancer     removed    Type 2 diabetes mellitus (Multi)      Past Surgical History:   Procedure Laterality Date    CARDIAC CATHETERIZATION      CATARACT EXTRACTION Bilateral     COLONOSCOPY      KNEE ARTHROPLASTY Bilateral 2016     Family History   Problem Relation Name Age of Onset    Heart disease Mother      Prostate cancer Father       No Known Allergies    Prior to Admission medications    Medication Sig Start Date End Date Taking? Authorizing Provider   amLODIPine (Norvasc) 10 mg tablet Take 1 tablet (10 mg) by mouth once daily. 16   Historical Provider, MD   aspirin 81 mg EC tablet Take 1 tablet (81 mg) by mouth once daily.    Historical Provider, MD   cholecalciferol (Vitamin D-3) 50 mcg (2,000 unit) capsule Take 1 capsule (50 mcg) by mouth early in the morning..    Historical Provider, MD   cyanocobalamin (Vitamin B-12) 1,000 mcg tablet Take 1 tablet (1,000 mcg) by mouth once daily.    Historical Provider, MD   fenofibrate (Tricor) 145 mg tablet Take 1 tablet (145 mg) by mouth once daily. 23   Historical Provider, MD   glimepiride (Amaryl) 2 mg tablet TAKE 1 TABLET BY MOUTH BEFORE BREAKFAST AND 1 TABLET BEFORE DINNER 3/25/24   Historical Provider, MD   glipiZIDE (Glucotrol) 5 mg tablet Take 1 tablet (5 mg) by mouth 2 times a day before  meals. 2/12/16   Historical Provider, MD   hydrALAZINE (Apresoline) 50 mg tablet Take 1 tablet (50 mg) by mouth 3 times a day. 3/25/24   Historical Provider, MD   icosapent ethyL (Vascepa) 1 gram capsule TAKE TWO CAPSULES BY MOUTH TWO TIMES A DAY AFTER MEALS 3/25/24   Historical Provider, MD   Jardiance 25 mg Take 1 tablet (25 mg) by mouth once daily. 4/24/23   Historical Provider, MD   lisinopril 10 mg tablet Take 1 tablet (10 mg) by mouth once daily. 2/12/16   Historical Provider, MD   lisinopriL-hydrochlorothiazide 20-25 mg tablet Take 1 tablet by mouth once daily at bedtime.    Historical Provider, MD   metFORMIN (Glucophage) 1,000 mg tablet TAKE ONE TABLET BY MOUTH AFTER LUNCH AND ONE TABLET AFTER DINNER 1/8/24   Historical Provider, MD   potassium chloride CR 20 mEq ER tablet Take 1 tablet (20 mEq) by mouth 2 times a day. 3/25/24   Historical Provider, MD   rosuvastatin (Crestor) 20 mg tablet Take 1 tablet (20 mg) by mouth once daily. 10/14/22   Historical Provider, MD   Rybelsus 14 mg tablet tablet TAKE 1 TABLET BY MOUTH ONCE DAILY ON AN EMPTY STOMACH. DO NOT EAT OR DRINK ANYTHING AFTER FOR 30 MINUTES    Historical Provider, MD   Tresiba FlexTouch U-100 100 unit/mL (3 mL) injection INJECT 20 UNITS INTO THE SKIN AT NIGHT 1/8/24   Historical Provider, MD CARROLL ROS:   Constitutional:   neg     no fever   no chills  Neuro/Psych:   neg    Eyes:   neg    Ears:   neg    Nose:   neg    Mouth:   neg    Throat:   neg    Neck:   neg    Cardio:   neg    Respiratory:   neg    Endocrine:   neg    GI:   neg    :   neg    Musculoskeletal:   neg    Hematologic:   neg     no history of blood transfusion   no blood clots  Skin:  neg      Physical Exam  Vitals reviewed.   Constitutional:       Appearance: Normal appearance. He is normal weight.   HENT:      Head: Normocephalic and atraumatic.      Nose: Nose normal.      Mouth/Throat:      Mouth: Mucous membranes are moist.      Pharynx: Oropharynx is clear.   Eyes:       Extraocular Movements: Extraocular movements intact.      Pupils: Pupils are equal, round, and reactive to light.   Cardiovascular:      Rate and Rhythm: Normal rate and regular rhythm.      Heart sounds: Murmur heard.   Pulmonary:      Effort: Pulmonary effort is normal.      Breath sounds: Normal breath sounds.   Abdominal:      General: Abdomen is flat. Bowel sounds are normal.      Palpations: Abdomen is soft.   Musculoskeletal:         General: Normal range of motion.      Cervical back: Normal range of motion and neck supple.   Skin:     General: Skin is warm and dry.   Neurological:      Mental Status: He is alert and oriented to person, place, and time.   Psychiatric:         Mood and Affect: Mood normal.         Behavior: Behavior normal.         Thought Content: Thought content normal.         Judgment: Judgment normal.        PAT AIRWAY:   Airway:     Mallampati::  III    Neck ROM::  Full   upper dentures and lower dentures    Visit Vitals  /76 Comment: manual cuff   Pulse 73   Temp 36.4 °C (97.6 °F)   Resp 18     Vitals:    06/05/24 1430 06/05/24 1448   BP: 147/77 146/76  Comment: manual cuff   Pulse: 73    Resp: 18    Temp: 36.4 °C (97.6 °F)    SpO2: 97%    Weight: 93.8 kg (206 lb 11.2 oz)       DASI Risk Score      Flowsheet Row Most Recent Value   DASI SCORE 58.2   METS Score (Will be calculated only when all the questions are answered) 9.9          Caprini DVT Assessment      Flowsheet Row Most Recent Value   DVT Score 8   Current Status Major surgery planned, lasting over 3 hours   Age 60-75 years   BMI 30 or less          Modified Frailty Index      Flowsheet Row Most Recent Value   Modified Frailty Index Calculator .0909          CHADS2 Stroke Risk  Current as of 5 hours ago        N/A 3 to 100%: High Risk   2 to < 3%: Medium Risk   0 to < 2%: Low Risk     Last Change: N/A          This score determines the patient's risk of having a stroke if the patient has atrial fibrillation.        This  score is not applicable to this patient. Components are not calculated.          Revised Cardiac Risk Index      Flowsheet Row Most Recent Value   Revised Cardiac Risk Calculator 2          Apfel Simplified Score      Flowsheet Row Most Recent Value   Apfel Simplified Score Calculator 2          Risk Analysis Index Results This Encounter    No data found in the last 1 encounters.       Stop Bang Score      Flowsheet Row Most Recent Value   Do you snore loudly? 0   Do you often feel tired or fatigued after your sleep? 0   Has anyone ever observed you stop breathing in your sleep? 0   Do you have or are you being treated for high blood pressure? 1   Recent BMI (Calculated) 26.5   Is BMI greater than 35 kg/m2? 0=No   Age older than 50 years old? 1=Yes   Is your neck circumference greater than 17 inches (Male) or 16 inches (Female)? 0   Gender - Male 1=Yes   STOP-BANG Total Score 3          Assessment and Plan:     Anesthesia:   States he became aware during knee replacement and could hear sounds. States this was in [place] in [place]    Neuro:   No diagnosis or significant findings on chart review or clinical presentation and evaluation.    Patient given information on brain exercises and delirium prevention.    HEENT/Airway  No diagnosis or significant findings on chart review or clinical presentation and evaluation.    Cardiovascular    CAD, hypertension & hypertension  Currently taking rosuvastatin, metoprolol succinate XL, lisinopril-hydrochlorothiazide, Vascepa, hydralazine, fenofibrate, and amlodipine    BP today 146/76  No recent lipid panel noted     States is not having chest pain or shortness of breath. Scheduled for surgery    CARDS EVAL  The patient follows with cardiology, Dr. Angi Lala. Phone [phone],  Fax [phone]    RCRI  The patient meets 2 RCRI criteria and therefore has a 6.6% risk (elevated) of major adverse cardiac complications.  METS  The patient's functional capacity is  greater than 4 METS.  MACE  30-day risk for MACE of 2 predictors, 10.1% risk for cardiac death, nonfatal myocardial infarction, and nonfatal cardiac arrest  CARMELO  0.2% risk for 51st to 90th percentile    2-29-24: EKG sinus rhythm from media    4-16-24: Echo  CONCLUSIONS:   1. Left ventricular systolic function is mildly decreased with a 45-50% estimated ejection fraction.   2. Spectral Doppler shows an impaired relaxation pattern of left ventricular diastolic filling.   3. There is reduced right ventricular systolic function.   4. The left atrium is moderately dilated.   5. Mildly elevated RVSP.   6. Mild aortic valve stenosis.   7. The aortic valve appears tricuspid with restriction.   8. There is global hypokinesis of the left ventricle with minor regional variations.    Pulmonary   No diagnosis or significant findings on chart review or clinical presentation and evaluation.    6-5-24: Chest Xray  IMPRESSION:  1.  No evidence of acute cardiopulmonary process.    STOP BANG Score of 3, which places patient at intermediate risk for having MARIA T.  ARISCAT 50, High, 42.1% risk of in-hospital postoperative pulmonary complications  PRODIGY 23, high of respiratory depression episode.    Patient given information on deep breathing exercises.     Renal  No diagnosis or significant findings on chart review or clinical presentation and evaluation.    DPS 2 points medium risk for perioperative acute kidney injury.     Endocrine    Diabetes Evaluation  Currently controlled with Rybelsus, metformin, Jardiance, and glimepiride.   6-5-24: A1c 6.7    Hematology  No diagnosis or significant findings on chart review or clinical presentation and evaluation.    Antiplatelet management   The patient is currently receiving antiplatelet therapy for CAD.    Aspirin    Caprini score 8, high risk of VTE    Transfusion Evaluation  A type and screen was obtained given the likelihood for perioperative transfusion of blood or blood  products.    Patient given information on DVT prevention.     GI  No diagnosis or significant findings on chart review or clinical presentation and evaluation.    Eat 10- 0  Apfel: 2 points 39% risk for post operative nausea/vomiting.     Genitourinary    History of prostate cancer   States was treated in the past with no further issues.     ID  No diagnosis or significant findings on chart review or clinical presentation and evaluation.    MRSA swab ordered  UA with reflex culture ordered  Chlorhexidine mouth wash and body wash ordered    Musculoskeletal  No diagnosis or significant findings on chart review or clinical presentation and evaluation.    -Preoperative medication instructions were provided and reviewed with the patient.  Any additional testing or evaluation was explained to the patient.  NPO Instructions were discussed, and the patient's questions were answered prior to conclusion of this encounter    Labs ordered:    Recent Results (from the past 168 hour(s))   Basic Metabolic Panel    Collection Time: 06/05/24  2:32 PM   Result Value Ref Range    Glucose 127 (H) 74 - 99 mg/dL    Sodium 143 136 - 145 mmol/L    Potassium 3.5 3.5 - 5.3 mmol/L    Chloride 104 98 - 107 mmol/L    Bicarbonate 26 21 - 32 mmol/L    Anion Gap 17 10 - 20 mmol/L    Urea Nitrogen 26 (H) 6 - 23 mg/dL    Creatinine 1.66 (H) 0.50 - 1.30 mg/dL    eGFR 43 (L) >60 mL/min/1.73m*2    Calcium 9.5 8.6 - 10.6 mg/dL   CBC    Collection Time: 06/05/24  2:32 PM   Result Value Ref Range    WBC 7.8 4.4 - 11.3 x10*3/uL    nRBC 0.0 0.0 - 0.0 /100 WBCs    RBC 5.61 4.50 - 5.90 x10*6/uL    Hemoglobin 15.9 13.5 - 17.5 g/dL    Hematocrit 46.2 41.0 - 52.0 %    MCV 82 80 - 100 fL    MCH 28.3 26.0 - 34.0 pg    MCHC 34.4 32.0 - 36.0 g/dL    RDW 13.4 11.5 - 14.5 %    Platelets 226 150 - 450 x10*3/uL   Type And Screen    Collection Time: 06/05/24  2:32 PM   Result Value Ref Range    ABO TYPE A     Rh TYPE POS     ANTIBODY SCREEN NEG    Coagulation Screen     Collection Time: 06/05/24  2:32 PM   Result Value Ref Range    Protime 10.9 9.8 - 12.8 seconds    INR 1.0 0.9 - 1.1    aPTT 32 27 - 38 seconds   Hemoglobin A1C    Collection Time: 06/05/24  2:32 PM   Result Value Ref Range    Hemoglobin A1C 6.7 (H) see below %    Estimated Average Glucose 146 Not Established mg/dL   Staphylococcus aureus/MRSA colonization, Culture    Collection Time: 06/05/24  2:32 PM    Specimen: Nares/Axilla/Groin; Swab   Result Value Ref Range    Staph/MRSA Screen Culture No Staphylococcus aureus isolated    Urinalysis with Reflex Culture and Microscopic    Collection Time: 06/05/24  2:32 PM   Result Value Ref Range    Color, Urine Light-Yellow Light-Yellow, Yellow, Dark-Yellow    Appearance, Urine Clear Clear    Specific Gravity, Urine 1.020 1.005 - 1.035    pH, Urine 5.5 5.0, 5.5, 6.0, 6.5, 7.0, 7.5, 8.0    Protein, Urine 200 (2+) (A) NEGATIVE, 10 (TRACE), 20 (TRACE) mg/dL    Glucose, Urine OVER (4+) (A) Normal mg/dL    Blood, Urine 0.03 (TRACE) (A) NEGATIVE    Ketones, Urine NEGATIVE NEGATIVE mg/dL    Bilirubin, Urine NEGATIVE NEGATIVE    Urobilinogen, Urine Normal Normal mg/dL    Nitrite, Urine NEGATIVE NEGATIVE    Leukocyte Esterase, Urine NEGATIVE NEGATIVE   Extra Urine Gray Tube    Collection Time: 06/05/24  2:32 PM   Result Value Ref Range    Extra Tube Hold for add-ons.    CBC and Auto Differential    Collection Time: 06/05/24  2:32 PM   Result Value Ref Range    WBC 7.8 4.4 - 11.3 x10*3/uL    nRBC 0.0 0.0 - 0.0 /100 WBCs    RBC 5.61 4.50 - 5.90 x10*6/uL    Hemoglobin 15.9 13.5 - 17.5 g/dL    Hematocrit 46.2 41.0 - 52.0 %    MCV 82 80 - 100 fL    MCH 28.3 26.0 - 34.0 pg    MCHC 34.4 32.0 - 36.0 g/dL    RDW 13.4 11.5 - 14.5 %    Platelets 226 150 - 450 x10*3/uL    Neutrophils % 73.0 40.0 - 80.0 %    Immature Granulocytes %, Automated 0.5 0.0 - 0.9 %    Lymphocytes % 16.5 13.0 - 44.0 %    Monocytes % 8.2 2.0 - 10.0 %    Eosinophils % 1.3 0.0 - 6.0 %    Basophils % 0.5 0.0 - 2.0 %     Neutrophils Absolute 5.69 (H) 1.60 - 5.50 x10*3/uL    Immature Granulocytes Absolute, Automated 0.04 0.00 - 0.50 x10*3/uL    Lymphocytes Absolute 1.29 0.80 - 3.00 x10*3/uL    Monocytes Absolute 0.64 0.05 - 0.80 x10*3/uL    Eosinophils Absolute 0.10 0.00 - 0.40 x10*3/uL    Basophils Absolute 0.04 0.00 - 0.10 x10*3/uL   Magnesium    Collection Time: 06/05/24  2:32 PM   Result Value Ref Range    Magnesium 2.24 1.60 - 2.40 mg/dL   Hepatic function panel    Collection Time: 06/05/24  2:32 PM   Result Value Ref Range    Albumin 4.2 3.4 - 5.0 g/dL    Bilirubin, Total 0.4 0.0 - 1.2 mg/dL    Bilirubin, Direct 0.1 0.0 - 0.3 mg/dL    Alkaline Phosphatase 73 33 - 136 U/L    ALT 25 10 - 52 U/L    AST 19 9 - 39 U/L    Total Protein 6.9 6.4 - 8.2 g/dL   Urinalysis Microscopic    Collection Time: 06/05/24  2:32 PM   Result Value Ref Range    WBC, Urine NONE 1-5, NONE /HPF    RBC, Urine 1-2 NONE, 1-2, 3-5 /HPF    Mucus, Urine FEW Reference range not established. /LPF

## 2024-06-06 LAB — HOLD SPECIMEN: NORMAL

## 2024-06-07 LAB — STAPHYLOCOCCUS SPEC CULT: NORMAL

## 2024-06-17 RX ORDER — FAMOTIDINE 10 MG/ML
20 INJECTION INTRAVENOUS ONCE AS NEEDED
Status: CANCELLED | OUTPATIENT
Start: 2024-06-17

## 2024-06-17 RX ORDER — EPINEPHRINE 0.3 MG/.3ML
0.3 INJECTION SUBCUTANEOUS AS NEEDED
Status: CANCELLED | OUTPATIENT
Start: 2024-06-17

## 2024-06-17 RX ORDER — DIPHENHYDRAMINE HYDROCHLORIDE 50 MG/ML
50 INJECTION INTRAMUSCULAR; INTRAVENOUS ONCE AS NEEDED
Status: CANCELLED | OUTPATIENT
Start: 2024-06-17

## 2024-06-17 RX ORDER — ALBUTEROL SULFATE 0.83 MG/ML
3 SOLUTION RESPIRATORY (INHALATION) ONCE AS NEEDED
Status: CANCELLED | OUTPATIENT
Start: 2024-06-17

## 2024-06-19 ENCOUNTER — HOSPITAL ENCOUNTER (OUTPATIENT)
Dept: RESEARCH | Facility: HOSPITAL | Age: 74
Discharge: HOME | End: 2024-06-19
Payer: MEDICARE

## 2024-06-19 VITALS
DIASTOLIC BLOOD PRESSURE: 86 MMHG | RESPIRATION RATE: 20 BRPM | OXYGEN SATURATION: 97 % | TEMPERATURE: 98.1 F | HEART RATE: 66 BPM | SYSTOLIC BLOOD PRESSURE: 160 MMHG

## 2024-06-19 DIAGNOSIS — I35.0 AORTIC STENOSIS, SEVERE: ICD-10-CM

## 2024-06-19 DIAGNOSIS — Z00.6 RESEARCH STUDY PATIENT: ICD-10-CM

## 2024-06-19 DIAGNOSIS — I35.0 AORTIC VALVE STENOSIS, ETIOLOGY OF CARDIAC VALVE DISEASE UNSPECIFIED: ICD-10-CM

## 2024-06-19 LAB
ALBUMIN SERPL BCP-MCNC: 4 G/DL (ref 3.4–5)
ALP SERPL-CCNC: 71 U/L (ref 33–136)
ALT SERPL W P-5'-P-CCNC: 26 U/L (ref 10–52)
ANION GAP SERPL CALC-SCNC: 14 MMOL/L (ref 10–20)
AST SERPL W P-5'-P-CCNC: 18 U/L (ref 9–39)
BASOPHILS # BLD AUTO: 0.04 X10*3/UL (ref 0–0.1)
BASOPHILS NFR BLD AUTO: 0.7 %
BILIRUB SERPL-MCNC: 0.5 MG/DL (ref 0–1.2)
BUN SERPL-MCNC: 26 MG/DL (ref 6–23)
CALCIUM SERPL-MCNC: 9.2 MG/DL (ref 8.6–10.6)
CHLORIDE SERPL-SCNC: 103 MMOL/L (ref 98–107)
CO2 SERPL-SCNC: 26 MMOL/L (ref 21–32)
CREAT SERPL-MCNC: 1.39 MG/DL (ref 0.5–1.3)
CREAT UR-MCNC: 59.9 MG/DL (ref 20–370)
EGFRCR SERPLBLD CKD-EPI 2021: 54 ML/MIN/1.73M*2
EOSINOPHIL # BLD AUTO: 0.08 X10*3/UL (ref 0–0.4)
EOSINOPHIL NFR BLD AUTO: 1.5 %
ERYTHROCYTE [DISTWIDTH] IN BLOOD BY AUTOMATED COUNT: 13.7 % (ref 11.5–14.5)
GLUCOSE SERPL-MCNC: 220 MG/DL (ref 74–99)
HCT VFR BLD AUTO: 44.8 % (ref 41–52)
HGB BLD-MCNC: 15.5 G/DL (ref 13.5–17.5)
IMM GRANULOCYTES # BLD AUTO: 0.06 X10*3/UL (ref 0–0.5)
IMM GRANULOCYTES NFR BLD AUTO: 1.1 % (ref 0–0.9)
LYMPHOCYTES # BLD AUTO: 0.91 X10*3/UL (ref 0.8–3)
LYMPHOCYTES NFR BLD AUTO: 16.9 %
MAGNESIUM SERPL-MCNC: 2.18 MG/DL (ref 1.6–2.4)
MCH RBC QN AUTO: 28.8 PG (ref 26–34)
MCHC RBC AUTO-ENTMCNC: 34.6 G/DL (ref 32–36)
MCV RBC AUTO: 83 FL (ref 80–100)
MICROALBUMIN UR-MCNC: >2250 MG/L
MICROALBUMIN/CREAT UR: NORMAL MG/G{CREAT}
MONOCYTES # BLD AUTO: 0.46 X10*3/UL (ref 0.05–0.8)
MONOCYTES NFR BLD AUTO: 8.6 %
NEUTROPHILS # BLD AUTO: 3.82 X10*3/UL (ref 1.6–5.5)
NEUTROPHILS NFR BLD AUTO: 71.2 %
NRBC BLD-RTO: 0 /100 WBCS (ref 0–0)
PLATELET # BLD AUTO: 179 X10*3/UL (ref 150–450)
POTASSIUM SERPL-SCNC: 3.7 MMOL/L (ref 3.5–5.3)
PROT SERPL-MCNC: 6.5 G/DL (ref 6.4–8.2)
RBC # BLD AUTO: 5.39 X10*6/UL (ref 4.5–5.9)
SODIUM SERPL-SCNC: 139 MMOL/L (ref 136–145)
WBC # BLD AUTO: 5.4 X10*3/UL (ref 4.4–11.3)

## 2024-06-19 PROCEDURE — 84075 ASSAY ALKALINE PHOSPHATASE: CPT

## 2024-06-19 PROCEDURE — 82310 ASSAY OF CALCIUM: CPT

## 2024-06-19 PROCEDURE — 82570 ASSAY OF URINE CREATININE: CPT

## 2024-06-19 PROCEDURE — 2500000004 HC RX 250 GENERAL PHARMACY W/ HCPCS (ALT 636 FOR OP/ED)

## 2024-06-19 PROCEDURE — 85025 COMPLETE CBC W/AUTO DIFF WBC: CPT

## 2024-06-19 PROCEDURE — 82043 UR ALBUMIN QUANTITATIVE: CPT

## 2024-06-19 PROCEDURE — 83735 ASSAY OF MAGNESIUM: CPT

## 2024-06-19 PROCEDURE — 2500000005 HC RX 250 GENERAL PHARMACY W/O HCPCS

## 2024-06-19 RX ORDER — FAMOTIDINE 10 MG/ML
20 INJECTION INTRAVENOUS ONCE AS NEEDED
Status: DISCONTINUED | OUTPATIENT
Start: 2024-06-19 | End: 2024-06-20 | Stop reason: HOSPADM

## 2024-06-19 RX ORDER — ALBUTEROL SULFATE 0.83 MG/ML
3 SOLUTION RESPIRATORY (INHALATION) ONCE AS NEEDED
Status: DISCONTINUED | OUTPATIENT
Start: 2024-06-19 | End: 2024-06-20 | Stop reason: HOSPADM

## 2024-06-19 RX ORDER — DIPHENHYDRAMINE HYDROCHLORIDE 50 MG/ML
50 INJECTION INTRAMUSCULAR; INTRAVENOUS ONCE AS NEEDED
Status: DISCONTINUED | OUTPATIENT
Start: 2024-06-19 | End: 2024-06-20 | Stop reason: HOSPADM

## 2024-06-19 RX ORDER — EPINEPHRINE 1 MG/ML
0.3 INJECTION INTRAMUSCULAR; INTRAVENOUS; SUBCUTANEOUS AS NEEDED
Status: DISCONTINUED | OUTPATIENT
Start: 2024-06-19 | End: 2024-06-20 | Stop reason: HOSPADM

## 2024-06-19 NOTE — RESEARCH NOTES
Study Name: [id number]  IRB#: DCRU#:  [id number]  Protocol Version Dated:  Version 3_ 25 Oct 2023  PI: Fritz Mathias     Time point: Day 1 >=24h but <=48 h Prior to surgery    Encounter Date: 06/19/2024  Encounter Time: 10:00 AM EDT  Encounter Department: [facility] HEMATOLOGY AND ONCOLOGY     #1    Phone Secure Chat   Mary Lee [phone]     #2 Phone Secure Chat   [place] [phone]     #3 Phone Secure Chat   [place]amar [place] [phone]      Visit Provider: [address], [facility] ROOM 15 [facility]   Umair Carney is a 74 y.o. male and is here for a Research clinical visit.    Allergies: No Known Allergies    Study Regimen and Dosing   A Phase 3, Randomized, Double-Blind, Placebo-Controlled Study to Evaluate the Effect of RBT-1 on Reducing the Risk of Post-Operative Complications in Subjects Undergoing Cardiac Surgery    The Day of Surgery should occur between 24 and 48 h after the START of the Day 1 study drug infusion ; if a surgery is delayed, it must occur within 30 days of study drug infusion (ie, no later than Day 30); if surgery is delayed beyond Day 30, the subject will be followed for safety only and should return for safety visits (ie, all assessments as specified for the 30 and 60 days post-op visits, excluding the Post-Operative Complications and the Patient Reported Outcome Questionnaire) on Day 30 (ie, 29 [±3] days after study drug infusion) and Day 60 (ie, 59 [±3] days after study drug infusion)    If  study drug infusion (Day 1) occurs on the day of Screening (ie, subjects are administered study drug on the same day as Screening), additional blood samples for Day 1 are not required unless the Screening labs were not collected on the Day 1 visit     Admission and Prior to Starting Study Activities    will complete study Questionnaires, and other assessments.   Perform venipuncture for sample collection  procedures.    Physical Exam   Study team will send prescription to pharmacy for study medications     Criteria to Treat   DCRU RN reviewed and meets eligibility to proceed with treatment plan   Time team notified: [id number]   DCRU RN notifies study team to review eligibility and approval before dosing procedures  Time team approves: [id number]      Dietary Guidelines   Regular Diet   Objective   Vital Signs:   /85 (BP Location: Left arm)   Pulse 70   Temp 36.9 °C (98.4 °F) (Oral)   Resp 20   SpO2 98%         Medications as of the completion of today's visit:      Orders placed during today's visit:  No orders of the defined types were placed in this encounter.      No study found    Study Specific Instructions and Documentation   Snapshot of performable actions in the order performed:  Vitals  Labs  Predose ECG  Study Drug Infusion  + 2hr Post dose ECG from the start of infusion   30 min EOI observation time     PRE-DOSE Vital Signs   Document following Vitals, Document in EPIC.  BP  160/86  Oral Temperature   36.7  Pulse 66  Respirations   20     Time Obtained: 1215     PRE-DOSE Safety Labs   See orders in epic (signed and held orders if any labs are ordered)      Infusion-Related Reactions   Review Safety Parameters on the medication Order. Order in [facility] SOC Hypersensitivity Orders     PRE-DOSE ECG  Prior  to infusion as close as possible to infusion   Rest  supine or semi supine position for 10 min   Single  [facility] Standard ECG Machine  MD/Provider  or coordinator to review and will send to md if applicable  Do not wait for approval, unless there is an abnormality    Time Point   Time Completed   Pre-dose 1231     Research Drug Administration/Placebo  Protect from light if not administered immediately after preparation    Document medication administration in MAR activity.   Double blinded  Total volume is 125 mL  Administer RBT-1/Placebo IV in 0.9% normal saline via Alaris pump  Non-DEHP Pathway IV bag and  tubing  IDS ("[employer] [facility]) will prime filter & tubing with active drug/placebo.  To maintain the blind, IDS will place shroud over bag and over tubing. (There is a color difference between active and placebo. Only unblinded staff should load bag into the IV pump.)   IDS ("[employer] [facility]) will note Total Volume on IV bag label.  Infuse over 115 minutes (65ml/hr)  Document start and end time  Beyond Use Date: Must begin within 2 hours of preparation (4 hour total BUD)   As IV bag empties, flush line using 50 mL Normal Saline 0.9% in PhaSeal® syringe or equivalent CTSD to ensure entire dose is given, at same rate of infusion.     Infusion Date/ Start Time    6/19/24      1238      Infusion Date/ End Time 6/19/24    1433         Arm used for infusion: left    Total Volume of infusion 125 ml    Total Duration of infusion (not including interruption or stoppage time)     115 minutes    Any interruptions or stoppages, including the reason and total interruption/stoppage time  No    Dispose the IV infusion material and document destruction  1433    Observation for 30 minutes after completion of infusion   Observation  Start time 1433         Observation End Time 1503      +2 hours  Post -DOSE ECG from the start of infusion   Rest  supine or semi supine position for 10 min   Single  [facility] Standard [facility] Machine  Notify md or coordinator if there is a change from predose ECG      Time Point   Time Completed   +2 hours post dose from start of infusion  (± 5 min) 1435     Discharge Instructions   Post dose observation 30 min after the end of infusion    4- nb  BEFORE SAVING REMEMBER TO REFRESH THE NOTE TO PULL IN ALL DATA FROM EPIC  Carol Ann Belcher RN  06/19/24

## 2024-06-20 ENCOUNTER — DOCUMENTATION (OUTPATIENT)
Dept: RESEARCH | Age: 74
End: 2024-06-20
Payer: MEDICARE

## 2024-06-20 ENCOUNTER — ANESTHESIA EVENT (OUTPATIENT)
Dept: OPERATING ROOM | Facility: HOSPITAL | Age: 74
End: 2024-06-20
Payer: MEDICARE

## 2024-06-21 ENCOUNTER — HOSPITAL ENCOUNTER (OUTPATIENT)
Dept: OPERATING ROOM | Facility: HOSPITAL | Age: 74
Discharge: HOME | End: 2024-06-21

## 2024-06-21 ENCOUNTER — DOCUMENTATION (OUTPATIENT)
Dept: RESEARCH | Age: 74
End: 2024-06-21

## 2024-06-21 ENCOUNTER — ANESTHESIA (OUTPATIENT)
Dept: OPERATING ROOM | Facility: HOSPITAL | Age: 74
End: 2024-06-21
Payer: MEDICARE

## 2024-06-21 ENCOUNTER — APPOINTMENT (OUTPATIENT)
Dept: RADIOLOGY | Facility: HOSPITAL | Age: 74
DRG: 220 | End: 2024-06-21
Payer: MEDICARE

## 2024-06-21 ENCOUNTER — HOSPITAL ENCOUNTER (INPATIENT)
Facility: HOSPITAL | Age: 74
DRG: 220 | End: 2024-06-21
Attending: THORACIC SURGERY (CARDIOTHORACIC VASCULAR SURGERY)
Payer: MEDICARE

## 2024-06-21 ENCOUNTER — APPOINTMENT (OUTPATIENT)
Dept: CARDIOLOGY | Facility: HOSPITAL | Age: 74
DRG: 220 | End: 2024-06-21
Payer: MEDICARE

## 2024-06-21 DIAGNOSIS — Z95.2 S/P AVR (AORTIC VALVE REPLACEMENT): ICD-10-CM

## 2024-06-21 DIAGNOSIS — Z97.8 FOLEY CATHETER IN PLACE: ICD-10-CM

## 2024-06-21 DIAGNOSIS — Z95.2 PRESENCE OF PROSTHETIC HEART VALVE: ICD-10-CM

## 2024-06-21 DIAGNOSIS — I35.8 OTHER NONRHEUMATIC AORTIC VALVE DISORDERS: ICD-10-CM

## 2024-06-21 DIAGNOSIS — R33.8 ACUTE URINARY RETENTION: ICD-10-CM

## 2024-06-21 DIAGNOSIS — Z95.1 S/P CABG (CORONARY ARTERY BYPASS GRAFT): ICD-10-CM

## 2024-06-21 DIAGNOSIS — I35.0 AORTIC VALVE STENOSIS, ETIOLOGY OF CARDIAC VALVE DISEASE UNSPECIFIED: Primary | ICD-10-CM

## 2024-06-21 DIAGNOSIS — I35.0 NONRHEUMATIC AORTIC VALVE STENOSIS: ICD-10-CM

## 2024-06-21 DIAGNOSIS — I25.10 CORONARY ARTERY ARTERIOSCLEROSIS: ICD-10-CM

## 2024-06-21 LAB
ABO GROUP (TYPE) IN BLOOD: NORMAL
ACT BLD: 104 SEC (ref 82–174)
ACT BLD: 448 SEC (ref 82–174)
ACT BLD: 485 SEC (ref 82–174)
ACT BLD: 497 SEC (ref 82–174)
ACT BLD: 510 SEC (ref 82–174)
ACT BLD: 700 SEC (ref 82–174)
ACT BLD: 85 SEC (ref 82–174)
ACT BLD: 98 SEC (ref 82–174)
ALBUMIN SERPL BCP-MCNC: 3.5 G/DL (ref 3.4–5)
ALBUMIN SERPL BCP-MCNC: 4 G/DL (ref 3.4–5)
ALP SERPL-CCNC: 67 U/L (ref 33–136)
ALT SERPL W P-5'-P-CCNC: 21 U/L (ref 10–52)
ANION GAP BLDA CALCULATED.4IONS-SCNC: 10 MMO/L (ref 10–25)
ANION GAP BLDA CALCULATED.4IONS-SCNC: 11 MMO/L (ref 10–25)
ANION GAP BLDA CALCULATED.4IONS-SCNC: 12 MMO/L (ref 10–25)
ANION GAP BLDA CALCULATED.4IONS-SCNC: 12 MMO/L (ref 10–25)
ANION GAP BLDA CALCULATED.4IONS-SCNC: 13 MMO/L (ref 10–25)
ANION GAP BLDA CALCULATED.4IONS-SCNC: 14 MMO/L (ref 10–25)
ANION GAP BLDA CALCULATED.4IONS-SCNC: 15 MMO/L (ref 10–25)
ANION GAP BLDA CALCULATED.4IONS-SCNC: 9 MMO/L (ref 10–25)
ANION GAP BLDA CALCULATED.4IONS-SCNC: 9 MMO/L (ref 10–25)
ANION GAP BLDV CALCULATED.4IONS-SCNC: 12 MMOL/L (ref 10–25)
ANION GAP SERPL CALC-SCNC: 14 MMOL/L (ref 10–20)
ANION GAP SERPL CALC-SCNC: 17 MMOL/L (ref 10–20)
APTT PPP: 28 SECONDS (ref 27–38)
AST SERPL W P-5'-P-CCNC: 15 U/L (ref 9–39)
BASE EXCESS BLDA CALC-SCNC: -0.6 MMOL/L (ref -2–3)
BASE EXCESS BLDA CALC-SCNC: -1.4 MMOL/L (ref -2–3)
BASE EXCESS BLDA CALC-SCNC: -1.9 MMOL/L (ref -2–3)
BASE EXCESS BLDA CALC-SCNC: -2.3 MMOL/L (ref -2–3)
BASE EXCESS BLDA CALC-SCNC: -2.6 MMOL/L (ref -2–3)
BASE EXCESS BLDA CALC-SCNC: -2.7 MMOL/L (ref -2–3)
BASE EXCESS BLDA CALC-SCNC: -2.8 MMOL/L (ref -2–3)
BASE EXCESS BLDA CALC-SCNC: -2.9 MMOL/L (ref -2–3)
BASE EXCESS BLDA CALC-SCNC: -3.7 MMOL/L (ref -2–3)
BASE EXCESS BLDA CALC-SCNC: -3.8 MMOL/L (ref -2–3)
BASE EXCESS BLDA CALC-SCNC: 0.2 MMOL/L (ref -2–3)
BASE EXCESS BLDA CALC-SCNC: 2.3 MMOL/L (ref -2–3)
BASE EXCESS BLDV CALC-SCNC: -2.3 MMOL/L (ref -2–3)
BASOPHILS # BLD AUTO: 0.02 X10*3/UL (ref 0–0.1)
BASOPHILS NFR BLD AUTO: 0.3 %
BILIRUB SERPL-MCNC: 0.6 MG/DL (ref 0–1.2)
BODY TEMPERATURE: 37 DEGREES CELSIUS
BUN SERPL-MCNC: 24 MG/DL (ref 6–23)
BUN SERPL-MCNC: 26 MG/DL (ref 6–23)
CA-I BLD-SCNC: 1.1 MMOL/L (ref 1.1–1.33)
CA-I BLDA-SCNC: 1.07 MMOL/L (ref 1.1–1.33)
CA-I BLDA-SCNC: 1.1 MMOL/L (ref 1.1–1.33)
CA-I BLDA-SCNC: 1.1 MMOL/L (ref 1.1–1.33)
CA-I BLDA-SCNC: 1.12 MMOL/L (ref 1.1–1.33)
CA-I BLDA-SCNC: 1.16 MMOL/L (ref 1.1–1.33)
CA-I BLDA-SCNC: 1.16 MMOL/L (ref 1.1–1.33)
CA-I BLDA-SCNC: 1.17 MMOL/L (ref 1.1–1.33)
CA-I BLDA-SCNC: 1.18 MMOL/L (ref 1.1–1.33)
CA-I BLDA-SCNC: 1.19 MMOL/L (ref 1.1–1.33)
CA-I BLDA-SCNC: 1.26 MMOL/L (ref 1.1–1.33)
CA-I BLDA-SCNC: 1.29 MMOL/L (ref 1.1–1.33)
CA-I BLDV-SCNC: 1.12 MMOL/L (ref 1.1–1.33)
CALCIUM SERPL-MCNC: 8.2 MG/DL (ref 8.6–10.6)
CALCIUM SERPL-MCNC: 9 MG/DL (ref 8.6–10.6)
CARDIAC TROPONIN I PNL SERPL HS: 2066 NG/L (ref 0–53)
CARDIAC TROPONIN I PNL SERPL HS: 47 NG/L (ref 0–53)
CFT FORM KAOLIN IND BLD RES TEG: 1.3 MIN (ref 0.8–2.1)
CFT FORM KAOLIN IND BLD RES TEG: 1.5 MIN (ref 0.8–2.1)
CHLORIDE BLDA-SCNC: 105 MMOL/L (ref 98–107)
CHLORIDE BLDA-SCNC: 106 MMOL/L (ref 98–107)
CHLORIDE BLDA-SCNC: 110 MMOL/L (ref 98–107)
CHLORIDE BLDV-SCNC: 105 MMOL/L (ref 98–107)
CHLORIDE SERPL-SCNC: 104 MMOL/L (ref 98–107)
CHLORIDE SERPL-SCNC: 107 MMOL/L (ref 98–107)
CLOT ANGLE.KAOLIN INDUCED BLD RES TEG: 72 DEG (ref 63–78)
CLOT ANGLE.KAOLIN INDUCED BLD RES TEG: 72 DEG (ref 63–78)
CLOT INIT KAO IND P HEP NEUT BLD RES TEG: 6.3 MIN (ref 4.3–8.3)
CLOT INIT KAO IND P HEP NEUT BLD RES TEG: 8.2 MIN (ref 4.6–9.1)
CLOT INIT KAO IND P HEP NEUT BLD RES TEG: 8.4 MIN (ref 4.3–8.3)
CLOT INIT KAO IND P HEP NEUT BLD RES TEG: 8.9 MIN (ref 4.6–9.1)
CO2 SERPL-SCNC: 22 MMOL/L (ref 21–32)
CO2 SERPL-SCNC: 23 MMOL/L (ref 21–32)
COHGB MFR BLDA: 1.4 %
COHGB MFR BLDA: 1.5 %
COHGB MFR BLDA: 1.5 %
COHGB MFR BLDA: 1.6 %
COHGB MFR BLDA: 1.7 %
COHGB MFR BLDA: 1.7 %
COHGB MFR BLDA: 1.8 %
COHGB MFR BLDA: 1.9 %
COHGB MFR BLDV: 1.7 %
CREAT SERPL-MCNC: 1.42 MG/DL (ref 0.5–1.3)
CREAT SERPL-MCNC: 1.85 MG/DL (ref 0.5–1.3)
DO-HGB MFR BLDA: 0 % (ref 0–5)
DO-HGB MFR BLDA: 0.1 % (ref 0–5)
DO-HGB MFR BLDA: 0.1 % (ref 0–5)
DO-HGB MFR BLDA: 0.2 % (ref 0–5)
DO-HGB MFR BLDA: 1.6 % (ref 0–5)
EGFRCR SERPLBLD CKD-EPI 2021: 38 ML/MIN/1.73M*2
EGFRCR SERPLBLD CKD-EPI 2021: 52 ML/MIN/1.73M*2
EJECTION FRACTION: 43 %
EOSINOPHIL # BLD AUTO: 0.14 X10*3/UL (ref 0–0.4)
EOSINOPHIL NFR BLD AUTO: 1.9 %
ERYTHROCYTE [DISTWIDTH] IN BLOOD BY AUTOMATED COUNT: 13.6 % (ref 11.5–14.5)
ERYTHROCYTE [DISTWIDTH] IN BLOOD BY AUTOMATED COUNT: 14 % (ref 11.5–14.5)
FIBRINOGEN BLD CALC-MCNC: 370 MG/DL (ref 278–581)
FIBRINOGEN BLD CALC-MCNC: 374 MG/DL (ref 278–581)
FIBRINOGEN PPP-MCNC: 233 MG/DL (ref 200–400)
GLUCOSE BLD MANUAL STRIP-MCNC: 176 MG/DL (ref 74–99)
GLUCOSE BLD MANUAL STRIP-MCNC: 190 MG/DL (ref 74–99)
GLUCOSE BLD MANUAL STRIP-MCNC: 195 MG/DL (ref 74–99)
GLUCOSE BLDA-MCNC: 145 MG/DL (ref 74–99)
GLUCOSE BLDA-MCNC: 153 MG/DL (ref 74–99)
GLUCOSE BLDA-MCNC: 153 MG/DL (ref 74–99)
GLUCOSE BLDA-MCNC: 163 MG/DL (ref 74–99)
GLUCOSE BLDA-MCNC: 169 MG/DL (ref 74–99)
GLUCOSE BLDA-MCNC: 170 MG/DL (ref 74–99)
GLUCOSE BLDA-MCNC: 175 MG/DL (ref 74–99)
GLUCOSE BLDA-MCNC: 178 MG/DL (ref 74–99)
GLUCOSE BLDA-MCNC: 179 MG/DL (ref 74–99)
GLUCOSE BLDA-MCNC: 192 MG/DL (ref 74–99)
GLUCOSE BLDA-MCNC: 195 MG/DL (ref 74–99)
GLUCOSE BLDV-MCNC: 178 MG/DL (ref 74–99)
GLUCOSE SERPL-MCNC: 133 MG/DL (ref 74–99)
GLUCOSE SERPL-MCNC: 169 MG/DL (ref 74–99)
HCO3 BLDA-SCNC: 20.6 MMOL/L (ref 22–26)
HCO3 BLDA-SCNC: 20.6 MMOL/L (ref 22–26)
HCO3 BLDA-SCNC: 21.5 MMOL/L (ref 22–26)
HCO3 BLDA-SCNC: 22.1 MMOL/L (ref 22–26)
HCO3 BLDA-SCNC: 23.7 MMOL/L (ref 22–26)
HCO3 BLDA-SCNC: 23.7 MMOL/L (ref 22–26)
HCO3 BLDA-SCNC: 24.1 MMOL/L (ref 22–26)
HCO3 BLDA-SCNC: 24.2 MMOL/L (ref 22–26)
HCO3 BLDA-SCNC: 24.8 MMOL/L (ref 22–26)
HCO3 BLDA-SCNC: 26.3 MMOL/L (ref 22–26)
HCO3 BLDA-SCNC: 26.5 MMOL/L (ref 22–26)
HCO3 BLDA-SCNC: 26.5 MMOL/L (ref 22–26)
HCO3 BLDV-SCNC: 25.3 MMOL/L (ref 22–26)
HCT VFR BLD AUTO: 37.3 % (ref 41–52)
HCT VFR BLD AUTO: 43.3 % (ref 41–52)
HCT VFR BLD EST: 33 % (ref 41–52)
HCT VFR BLD EST: 34 % (ref 41–52)
HCT VFR BLD EST: 34 % (ref 41–52)
HCT VFR BLD EST: 36 % (ref 41–52)
HCT VFR BLD EST: 36 % (ref 41–52)
HCT VFR BLD EST: 37 % (ref 41–52)
HCT VFR BLD EST: 40 % (ref 41–52)
HCT VFR BLD EST: 43 % (ref 41–52)
HCT VFR BLD EST: 43 % (ref 41–52)
HCT VFR BLD EST: 44 % (ref 41–52)
HGB BLD-MCNC: 13.2 G/DL (ref 13.5–17.5)
HGB BLD-MCNC: 15.2 G/DL (ref 13.5–17.5)
HGB BLDA-MCNC: 11.1 G/DL (ref 13.5–17.5)
HGB BLDA-MCNC: 11.1 G/DL (ref 13.5–17.5)
HGB BLDA-MCNC: 11.3 G/DL (ref 13.5–17.5)
HGB BLDA-MCNC: 11.3 G/DL (ref 13.5–17.5)
HGB BLDA-MCNC: 11.4 G/DL (ref 13.5–17.5)
HGB BLDA-MCNC: 11.4 G/DL (ref 13.5–17.5)
HGB BLDA-MCNC: 11.9 G/DL (ref 13.5–17.5)
HGB BLDA-MCNC: 11.9 G/DL (ref 13.5–17.5)
HGB BLDA-MCNC: 12.2 G/DL (ref 13.5–17.5)
HGB BLDA-MCNC: 12.4 G/DL (ref 13.5–17.5)
HGB BLDA-MCNC: 13.2 G/DL (ref 13.5–17.5)
HGB BLDA-MCNC: 13.2 G/DL (ref 13.5–17.5)
HGB BLDA-MCNC: 14.3 G/DL (ref 13.5–17.5)
HGB BLDA-MCNC: 14.3 G/DL (ref 13.5–17.5)
HGB BLDA-MCNC: 14.4 G/DL (ref 13.5–17.5)
HGB BLDA-MCNC: 14.4 G/DL (ref 13.5–17.5)
HGB BLDA-MCNC: 14.5 G/DL (ref 13.5–17.5)
HGB BLDA-MCNC: 14.5 G/DL (ref 13.5–17.5)
HGB BLDV-MCNC: 12.1 G/DL (ref 13.5–17.5)
IMM GRANULOCYTES # BLD AUTO: 0.06 X10*3/UL (ref 0–0.5)
IMM GRANULOCYTES NFR BLD AUTO: 0.8 % (ref 0–0.9)
INHALED O2 CONCENTRATION: 100 %
INHALED O2 CONCENTRATION: 100 %
INHALED O2 CONCENTRATION: 21 %
INHALED O2 CONCENTRATION: 50 %
INHALED O2 CONCENTRATION: 50 %
INHALED O2 CONCENTRATION: 60 %
INHALED O2 CONCENTRATION: 90 %
INR PPP: 1.1 (ref 0.9–1.1)
LACTATE BLDA-SCNC: 2 MMOL/L (ref 0.4–2)
LACTATE BLDA-SCNC: 2 MMOL/L (ref 0.4–2)
LACTATE BLDA-SCNC: 2.2 MMOL/L (ref 0.4–2)
LACTATE BLDA-SCNC: 2.3 MMOL/L (ref 0.4–2)
LACTATE BLDA-SCNC: 2.5 MMOL/L (ref 0.4–2)
LACTATE BLDA-SCNC: 2.6 MMOL/L (ref 0.4–2)
LACTATE BLDA-SCNC: 2.9 MMOL/L (ref 0.4–2)
LACTATE BLDA-SCNC: 3.1 MMOL/L (ref 0.4–2)
LACTATE BLDV-SCNC: 2.1 MMOL/L (ref 0.4–2)
LYMPHOCYTES # BLD AUTO: 1.05 X10*3/UL (ref 0.8–3)
LYMPHOCYTES NFR BLD AUTO: 14.6 %
MA KAOLIN BLD RES TEG: 59 MM (ref 52–69)
MA KAOLIN BLD RES TEG: 62 MM (ref 52–69)
MA KAOLIN+TF BLD RES TEG: 61 MM (ref 52–70)
MA KAOLIN+TF BLD RES TEG: 62 MM (ref 52–70)
MA TF IND+IIB-IIIA INH BLD RES TEG: 20 MM (ref 15–32)
MA TF IND+IIB-IIIA INH BLD RES TEG: 20 MM (ref 15–32)
MAGNESIUM SERPL-MCNC: 2.03 MG/DL (ref 1.6–2.4)
MAGNESIUM SERPL-MCNC: 2.61 MG/DL (ref 1.6–2.4)
MCH RBC QN AUTO: 28.7 PG (ref 26–34)
MCH RBC QN AUTO: 29.3 PG (ref 26–34)
MCHC RBC AUTO-ENTMCNC: 35.1 G/DL (ref 32–36)
MCHC RBC AUTO-ENTMCNC: 35.4 G/DL (ref 32–36)
MCV RBC AUTO: 82 FL (ref 80–100)
MCV RBC AUTO: 83 FL (ref 80–100)
METHGB MFR BLDA: 0.5 % (ref 0–1.5)
METHGB MFR BLDA: 0.6 % (ref 0–1.5)
METHGB MFR BLDA: 0.7 % (ref 0–1.5)
METHGB MFR BLDA: 0.8 % (ref 0–1.5)
METHGB MFR BLDA: 0.9 % (ref 0–1.5)
METHGB MFR BLDA: 0.9 % (ref 0–1.5)
METHGB MFR BLDV: 0.6 % (ref 0–1.5)
MONOCYTES # BLD AUTO: 0.63 X10*3/UL (ref 0.05–0.8)
MONOCYTES NFR BLD AUTO: 8.8 %
NEUTROPHILS # BLD AUTO: 5.29 X10*3/UL (ref 1.6–5.5)
NEUTROPHILS NFR BLD AUTO: 73.6 %
NRBC BLD-RTO: 0 /100 WBCS (ref 0–0)
NRBC BLD-RTO: 0 /100 WBCS (ref 0–0)
OXYHGB MFR BLDA: 96.1 % (ref 94–98)
OXYHGB MFR BLDA: 96.1 % (ref 94–98)
OXYHGB MFR BLDA: 97.2 % (ref 94–98)
OXYHGB MFR BLDA: 97.3 % (ref 94–98)
OXYHGB MFR BLDA: 97.3 % (ref 94–98)
OXYHGB MFR BLDA: 97.4 % (ref 94–98)
OXYHGB MFR BLDA: 97.4 % (ref 94–98)
OXYHGB MFR BLDA: 97.5 % (ref 94–98)
OXYHGB MFR BLDA: 97.6 % (ref 94–98)
OXYHGB MFR BLDA: 97.7 % (ref 94–98)
OXYHGB MFR BLDA: 97.7 % (ref 94–98)
OXYHGB MFR BLDA: 97.8 % (ref 94–98)
OXYHGB MFR BLDA: 97.8 % (ref 94–98)
OXYHGB MFR BLDA: 97.9 % (ref 94–98)
OXYHGB MFR BLDA: 97.9 % (ref 94–98)
OXYHGB MFR BLDV: 79.1 % (ref 45–75)
PCO2 BLDA: 31 MM HG (ref 38–42)
PCO2 BLDA: 31 MM HG (ref 38–42)
PCO2 BLDA: 39 MM HG (ref 38–42)
PCO2 BLDA: 39 MM HG (ref 38–42)
PCO2 BLDA: 42 MM HG (ref 38–42)
PCO2 BLDA: 43 MM HG (ref 38–42)
PCO2 BLDA: 47 MM HG (ref 38–42)
PCO2 BLDA: 47 MM HG (ref 38–42)
PCO2 BLDA: 48 MM HG (ref 38–42)
PCO2 BLDA: 48 MM HG (ref 38–42)
PCO2 BLDA: 49 MM HG (ref 38–42)
PCO2 BLDA: 51 MM HG (ref 38–42)
PCO2 BLDV: 55 MM HG (ref 41–51)
PH BLDA: 7.3 PH (ref 7.38–7.42)
PH BLDA: 7.31 PH (ref 7.38–7.42)
PH BLDA: 7.31 PH (ref 7.38–7.42)
PH BLDA: 7.32 PH (ref 7.38–7.42)
PH BLDA: 7.33 PH (ref 7.38–7.42)
PH BLDA: 7.33 PH (ref 7.38–7.42)
PH BLDA: 7.35 PH (ref 7.38–7.42)
PH BLDA: 7.43 PH (ref 7.38–7.42)
PH BLDA: 7.43 PH (ref 7.38–7.42)
PH BLDA: 7.44 PH (ref 7.38–7.42)
PH BLDV: 7.27 PH (ref 7.33–7.43)
PHOSPHATE SERPL-MCNC: 3.1 MG/DL (ref 2.5–4.9)
PLATELET # BLD AUTO: 183 X10*3/UL (ref 150–450)
PLATELET # BLD AUTO: 202 X10*3/UL (ref 150–450)
PO2 BLDA: 129 MM HG (ref 85–95)
PO2 BLDA: 130 MM HG (ref 85–95)
PO2 BLDA: 132 MM HG (ref 85–95)
PO2 BLDA: 154 MM HG (ref 85–95)
PO2 BLDA: 182 MM HG (ref 85–95)
PO2 BLDA: 265 MM HG (ref 85–95)
PO2 BLDA: 281 MM HG (ref 85–95)
PO2 BLDA: 360 MM HG (ref 85–95)
PO2 BLDA: 381 MM HG (ref 85–95)
PO2 BLDA: 386 MM HG (ref 85–95)
PO2 BLDA: 456 MM HG (ref 85–95)
PO2 BLDA: 83 MM HG (ref 85–95)
PO2 BLDV: 49 MM HG (ref 35–45)
POTASSIUM BLDA-SCNC: 3.3 MMOL/L (ref 3.5–5.3)
POTASSIUM BLDA-SCNC: 3.5 MMOL/L (ref 3.5–5.3)
POTASSIUM BLDA-SCNC: 3.5 MMOL/L (ref 3.5–5.3)
POTASSIUM BLDA-SCNC: 3.8 MMOL/L (ref 3.5–5.3)
POTASSIUM BLDA-SCNC: 3.8 MMOL/L (ref 3.5–5.3)
POTASSIUM BLDA-SCNC: 4.3 MMOL/L (ref 3.5–5.3)
POTASSIUM BLDA-SCNC: 4.9 MMOL/L (ref 3.5–5.3)
POTASSIUM BLDA-SCNC: 4.9 MMOL/L (ref 3.5–5.3)
POTASSIUM BLDA-SCNC: 5 MMOL/L (ref 3.5–5.3)
POTASSIUM BLDA-SCNC: 5.1 MMOL/L (ref 3.5–5.3)
POTASSIUM BLDA-SCNC: 5.6 MMOL/L (ref 3.5–5.3)
POTASSIUM BLDV-SCNC: 4.8 MMOL/L (ref 3.5–5.3)
POTASSIUM SERPL-SCNC: 3.3 MMOL/L (ref 3.5–5.3)
POTASSIUM SERPL-SCNC: 3.5 MMOL/L (ref 3.5–5.3)
PROT SERPL-MCNC: 6.5 G/DL (ref 6.4–8.2)
PROTHROMBIN TIME: 12.7 SECONDS (ref 9.8–12.8)
RBC # BLD AUTO: 4.51 X10*6/UL (ref 4.5–5.9)
RBC # BLD AUTO: 5.3 X10*6/UL (ref 4.5–5.9)
RH FACTOR (ANTIGEN D): NORMAL
SAO2 % BLDA: 100 % (ref 94–100)
SAO2 % BLDA: 98 % (ref 94–100)
SAO2 % BLDA: 99 % (ref 94–100)
SAO2 % BLDA: 99 % (ref 94–100)
SAO2 % BLDV: 81 % (ref 45–75)
SODIUM BLDA-SCNC: 136 MMOL/L (ref 136–145)
SODIUM BLDA-SCNC: 137 MMOL/L (ref 136–145)
SODIUM BLDA-SCNC: 138 MMOL/L (ref 136–145)
SODIUM BLDA-SCNC: 138 MMOL/L (ref 136–145)
SODIUM BLDA-SCNC: 140 MMOL/L (ref 136–145)
SODIUM BLDV-SCNC: 137 MMOL/L (ref 136–145)
SODIUM SERPL-SCNC: 139 MMOL/L (ref 136–145)
SODIUM SERPL-SCNC: 141 MMOL/L (ref 136–145)
SPECIMEN DRAWN FROM PATIENT: ABNORMAL
SPECIMEN DRAWN FROM PATIENT: ABNORMAL
TEST COMMENT: ABNORMAL
TEST COMMENT: NORMAL
VENTILATOR MODE: ABNORMAL
VENTILATOR MODE: ABNORMAL
WBC # BLD AUTO: 26.3 X10*3/UL (ref 4.4–11.3)
WBC # BLD AUTO: 7.2 X10*3/UL (ref 4.4–11.3)

## 2024-06-21 PROCEDURE — 82375 ASSAY CARBOXYHB QUANT: CPT | Mod: 91

## 2024-06-21 PROCEDURE — 33405 REPLACEMENT AORTIC VALVE OPN: CPT | Performed by: THORACIC SURGERY (CARDIOTHORACIC VASCULAR SURGERY)

## 2024-06-21 PROCEDURE — 36556 INSERT NON-TUNNEL CV CATH: CPT | Mod: GC | Performed by: STUDENT IN AN ORGANIZED HEALTH CARE EDUCATION/TRAINING PROGRAM

## 2024-06-21 PROCEDURE — 85027 COMPLETE CBC AUTOMATED: CPT | Performed by: STUDENT IN AN ORGANIZED HEALTH CARE EDUCATION/TRAINING PROGRAM

## 2024-06-21 PROCEDURE — 2500000004 HC RX 250 GENERAL PHARMACY W/ HCPCS (ALT 636 FOR OP/ED): Performed by: THORACIC SURGERY (CARDIOTHORACIC VASCULAR SURGERY)

## 2024-06-21 PROCEDURE — 82435 ASSAY OF BLOOD CHLORIDE: CPT | Mod: 91

## 2024-06-21 PROCEDURE — 3700000002 HC GENERAL ANESTHESIA TIME - EACH INCREMENTAL 1 MINUTE: Performed by: THORACIC SURGERY (CARDIOTHORACIC VASCULAR SURGERY)

## 2024-06-21 PROCEDURE — C1900 LEAD, CORONARY VENOUS: HCPCS | Performed by: THORACIC SURGERY (CARDIOTHORACIC VASCULAR SURGERY)

## 2024-06-21 PROCEDURE — 2500000004 HC RX 250 GENERAL PHARMACY W/ HCPCS (ALT 636 FOR OP/ED)

## 2024-06-21 PROCEDURE — A4312 CATH W/O BAG 2-WAY SILICONE: HCPCS | Performed by: THORACIC SURGERY (CARDIOTHORACIC VASCULAR SURGERY)

## 2024-06-21 PROCEDURE — A4649 SURGICAL SUPPLIES: HCPCS | Performed by: THORACIC SURGERY (CARDIOTHORACIC VASCULAR SURGERY)

## 2024-06-21 PROCEDURE — P9045 ALBUMIN (HUMAN), 5%, 250 ML: HCPCS | Mod: JZ,JG

## 2024-06-21 PROCEDURE — 1200000002 HC GENERAL ROOM WITH TELEMETRY DAILY

## 2024-06-21 PROCEDURE — 2720000007 HC OR 272 NO HCPCS: Performed by: THORACIC SURGERY (CARDIOTHORACIC VASCULAR SURGERY)

## 2024-06-21 PROCEDURE — 3600000018 HC OR TIME - INITIAL BASE CHARGE - PROCEDURE LEVEL SIX: Performed by: THORACIC SURGERY (CARDIOTHORACIC VASCULAR SURGERY)

## 2024-06-21 PROCEDURE — 33533 CABG ARTERIAL SINGLE: CPT | Performed by: THORACIC SURGERY (CARDIOTHORACIC VASCULAR SURGERY)

## 2024-06-21 PROCEDURE — 85347 COAGULATION TIME ACTIVATED: CPT | Mod: 91,MUE

## 2024-06-21 PROCEDURE — P9045 ALBUMIN (HUMAN), 5%, 250 ML: HCPCS | Mod: JZ | Performed by: STUDENT IN AN ORGANIZED HEALTH CARE EDUCATION/TRAINING PROGRAM

## 2024-06-21 PROCEDURE — 83735 ASSAY OF MAGNESIUM: CPT | Performed by: THORACIC SURGERY (CARDIOTHORACIC VASCULAR SURGERY)

## 2024-06-21 PROCEDURE — 84484 ASSAY OF TROPONIN QUANT: CPT | Performed by: STUDENT IN AN ORGANIZED HEALTH CARE EDUCATION/TRAINING PROGRAM

## 2024-06-21 PROCEDURE — 84132 ASSAY OF SERUM POTASSIUM: CPT | Mod: 91

## 2024-06-21 PROCEDURE — P9047 ALBUMIN (HUMAN), 25%, 50ML: HCPCS | Mod: JZ,JG | Performed by: THORACIC SURGERY (CARDIOTHORACIC VASCULAR SURGERY)

## 2024-06-21 PROCEDURE — 84484 ASSAY OF TROPONIN QUANT: CPT | Performed by: THORACIC SURGERY (CARDIOTHORACIC VASCULAR SURGERY)

## 2024-06-21 PROCEDURE — 33518 CABG ARTERY-VEIN TWO: CPT | Performed by: THORACIC SURGERY (CARDIOTHORACIC VASCULAR SURGERY)

## 2024-06-21 PROCEDURE — 2500000004 HC RX 250 GENERAL PHARMACY W/ HCPCS (ALT 636 FOR OP/ED): Performed by: STUDENT IN AN ORGANIZED HEALTH CARE EDUCATION/TRAINING PROGRAM

## 2024-06-21 PROCEDURE — 5A1221Z PERFORMANCE OF CARDIAC OUTPUT, CONTINUOUS: ICD-10-PCS | Performed by: THORACIC SURGERY (CARDIOTHORACIC VASCULAR SURGERY)

## 2024-06-21 PROCEDURE — C1889 IMPLANT/INSERT DEVICE, NOC: HCPCS | Performed by: THORACIC SURGERY (CARDIOTHORACIC VASCULAR SURGERY)

## 2024-06-21 PROCEDURE — 82805 BLOOD GASES W/O2 SATURATION: CPT | Mod: 91 | Performed by: STUDENT IN AN ORGANIZED HEALTH CARE EDUCATION/TRAINING PROGRAM

## 2024-06-21 PROCEDURE — 3600000017 HC OR TIME - EACH INCREMENTAL 1 MINUTE - PROCEDURE LEVEL SIX: Performed by: THORACIC SURGERY (CARDIOTHORACIC VASCULAR SURGERY)

## 2024-06-21 PROCEDURE — 99291 CRITICAL CARE FIRST HOUR: CPT | Performed by: STUDENT IN AN ORGANIZED HEALTH CARE EDUCATION/TRAINING PROGRAM

## 2024-06-21 PROCEDURE — 2500000001 HC RX 250 WO HCPCS SELF ADMINISTERED DRUGS (ALT 637 FOR MEDICARE OP): Performed by: STUDENT IN AN ORGANIZED HEALTH CARE EDUCATION/TRAINING PROGRAM

## 2024-06-21 PROCEDURE — 82810 BLOOD GASES O2 SAT ONLY: CPT

## 2024-06-21 PROCEDURE — 71045 X-RAY EXAM CHEST 1 VIEW: CPT

## 2024-06-21 PROCEDURE — 85025 COMPLETE CBC W/AUTO DIFF WBC: CPT | Performed by: THORACIC SURGERY (CARDIOTHORACIC VASCULAR SURGERY)

## 2024-06-21 PROCEDURE — 84132 ASSAY OF SERUM POTASSIUM: CPT | Mod: 91 | Performed by: STUDENT IN AN ORGANIZED HEALTH CARE EDUCATION/TRAINING PROGRAM

## 2024-06-21 PROCEDURE — 02100Z9 BYPASS CORONARY ARTERY, ONE ARTERY FROM LEFT INTERNAL MAMMARY, OPEN APPROACH: ICD-10-PCS | Performed by: THORACIC SURGERY (CARDIOTHORACIC VASCULAR SURGERY)

## 2024-06-21 PROCEDURE — 3600000011 HC PERFUSION TIME - INITIAL BASE CHARGE: Performed by: THORACIC SURGERY (CARDIOTHORACIC VASCULAR SURGERY)

## 2024-06-21 PROCEDURE — 2500000002 HC RX 250 W HCPCS SELF ADMINISTERED DRUGS (ALT 637 FOR MEDICARE OP, ALT 636 FOR OP/ED): Performed by: STUDENT IN AN ORGANIZED HEALTH CARE EDUCATION/TRAINING PROGRAM

## 2024-06-21 PROCEDURE — 36415 COLL VENOUS BLD VENIPUNCTURE: CPT | Performed by: ANESTHESIOLOGY

## 2024-06-21 PROCEDURE — 02HV33Z INSERTION OF INFUSION DEVICE INTO SUPERIOR VENA CAVA, PERCUTANEOUS APPROACH: ICD-10-PCS

## 2024-06-21 PROCEDURE — 02RF08Z REPLACEMENT OF AORTIC VALVE WITH ZOOPLASTIC TISSUE, OPEN APPROACH: ICD-10-PCS | Performed by: THORACIC SURGERY (CARDIOTHORACIC VASCULAR SURGERY)

## 2024-06-21 PROCEDURE — 99223 1ST HOSP IP/OBS HIGH 75: CPT | Performed by: STUDENT IN AN ORGANIZED HEALTH CARE EDUCATION/TRAINING PROGRAM

## 2024-06-21 PROCEDURE — 94002 VENT MGMT INPAT INIT DAY: CPT

## 2024-06-21 PROCEDURE — 82435 ASSAY OF BLOOD CHLORIDE: CPT | Mod: 91,MUE | Performed by: STUDENT IN AN ORGANIZED HEALTH CARE EDUCATION/TRAINING PROGRAM

## 2024-06-21 PROCEDURE — 3700000001 HC GENERAL ANESTHESIA TIME - INITIAL BASE CHARGE: Performed by: THORACIC SURGERY (CARDIOTHORACIC VASCULAR SURGERY)

## 2024-06-21 PROCEDURE — 96373 THER/PROPH/DIAG INJ IA: CPT | Performed by: THORACIC SURGERY (CARDIOTHORACIC VASCULAR SURGERY)

## 2024-06-21 PROCEDURE — 021109W BYPASS CORONARY ARTERY, TWO ARTERIES FROM AORTA WITH AUTOLOGOUS VENOUS TISSUE, OPEN APPROACH: ICD-10-PCS | Performed by: THORACIC SURGERY (CARDIOTHORACIC VASCULAR SURGERY)

## 2024-06-21 PROCEDURE — 82330 ASSAY OF CALCIUM: CPT | Mod: 91 | Performed by: STUDENT IN AN ORGANIZED HEALTH CARE EDUCATION/TRAINING PROGRAM

## 2024-06-21 PROCEDURE — 02L70CK OCCLUSION OF LEFT ATRIAL APPENDAGE WITH EXTRALUMINAL DEVICE, OPEN APPROACH: ICD-10-PCS | Performed by: THORACIC SURGERY (CARDIOTHORACIC VASCULAR SURGERY)

## 2024-06-21 PROCEDURE — 85384 FIBRINOGEN ACTIVITY: CPT | Performed by: STUDENT IN AN ORGANIZED HEALTH CARE EDUCATION/TRAINING PROGRAM

## 2024-06-21 PROCEDURE — 87081 CULTURE SCREEN ONLY: CPT | Performed by: STUDENT IN AN ORGANIZED HEALTH CARE EDUCATION/TRAINING PROGRAM

## 2024-06-21 PROCEDURE — 85384 FIBRINOGEN ACTIVITY: CPT

## 2024-06-21 PROCEDURE — 03B14ZZ EXCISION OF LEFT INTERNAL MAMMARY ARTERY, PERCUTANEOUS ENDOSCOPIC APPROACH: ICD-10-PCS | Performed by: THORACIC SURGERY (CARDIOTHORACIC VASCULAR SURGERY)

## 2024-06-21 PROCEDURE — 3600000012 HC PERFUSION TIME - EACH INCREMENTAL 1 MINUTE: Performed by: THORACIC SURGERY (CARDIOTHORACIC VASCULAR SURGERY)

## 2024-06-21 PROCEDURE — 2500000004 HC RX 250 GENERAL PHARMACY W/ HCPCS (ALT 636 FOR OP/ED): Mod: JZ | Performed by: STUDENT IN AN ORGANIZED HEALTH CARE EDUCATION/TRAINING PROGRAM

## 2024-06-21 PROCEDURE — 83605 ASSAY OF LACTIC ACID: CPT | Mod: 91 | Performed by: STUDENT IN AN ORGANIZED HEALTH CARE EDUCATION/TRAINING PROGRAM

## 2024-06-21 PROCEDURE — 83605 ASSAY OF LACTIC ACID: CPT | Mod: 91,MUE

## 2024-06-21 PROCEDURE — A4217 STERILE WATER/SALINE, 500 ML: HCPCS | Performed by: THORACIC SURGERY (CARDIOTHORACIC VASCULAR SURGERY)

## 2024-06-21 PROCEDURE — 06BP4ZZ EXCISION OF RIGHT SAPHENOUS VEIN, PERCUTANEOUS ENDOSCOPIC APPROACH: ICD-10-PCS | Performed by: THORACIC SURGERY (CARDIOTHORACIC VASCULAR SURGERY)

## 2024-06-21 PROCEDURE — 2500000005 HC RX 250 GENERAL PHARMACY W/O HCPCS: Performed by: STUDENT IN AN ORGANIZED HEALTH CARE EDUCATION/TRAINING PROGRAM

## 2024-06-21 PROCEDURE — 37799 UNLISTED PX VASCULAR SURGERY: CPT | Performed by: STUDENT IN AN ORGANIZED HEALTH CARE EDUCATION/TRAINING PROGRAM

## 2024-06-21 PROCEDURE — 2500000002 HC RX 250 W HCPCS SELF ADMINISTERED DRUGS (ALT 637 FOR MEDICARE OP, ALT 636 FOR OP/ED)

## 2024-06-21 PROCEDURE — 2500000005 HC RX 250 GENERAL PHARMACY W/O HCPCS

## 2024-06-21 PROCEDURE — 82947 ASSAY GLUCOSE BLOOD QUANT: CPT | Mod: 91

## 2024-06-21 PROCEDURE — 71045 X-RAY EXAM CHEST 1 VIEW: CPT | Performed by: STUDENT IN AN ORGANIZED HEALTH CARE EDUCATION/TRAINING PROGRAM

## 2024-06-21 PROCEDURE — 85610 PROTHROMBIN TIME: CPT | Performed by: STUDENT IN AN ORGANIZED HEALTH CARE EDUCATION/TRAINING PROGRAM

## 2024-06-21 PROCEDURE — 80053 COMPREHEN METABOLIC PANEL: CPT | Performed by: THORACIC SURGERY (CARDIOTHORACIC VASCULAR SURGERY)

## 2024-06-21 PROCEDURE — 2780000003 HC OR 278 NO HCPCS: Performed by: THORACIC SURGERY (CARDIOTHORACIC VASCULAR SURGERY)

## 2024-06-21 PROCEDURE — 36556 INSERT NON-TUNNEL CV CATH: CPT | Performed by: STUDENT IN AN ORGANIZED HEALTH CARE EDUCATION/TRAINING PROGRAM

## 2024-06-21 PROCEDURE — 74018 RADEX ABDOMEN 1 VIEW: CPT

## 2024-06-21 PROCEDURE — 33508 ENDOSCOPIC VEIN HARVEST: CPT | Performed by: THORACIC SURGERY (CARDIOTHORACIC VASCULAR SURGERY)

## 2024-06-21 PROCEDURE — 85347 COAGULATION TIME ACTIVATED: CPT | Mod: 91

## 2024-06-21 PROCEDURE — 93005 ELECTROCARDIOGRAM TRACING: CPT

## 2024-06-21 PROCEDURE — 83735 ASSAY OF MAGNESIUM: CPT | Performed by: STUDENT IN AN ORGANIZED HEALTH CARE EDUCATION/TRAINING PROGRAM

## 2024-06-21 DEVICE — IMPLANTABLE DEVICE
Type: IMPLANTABLE DEVICE | Site: HEART | Status: FUNCTIONAL
Brand: PERCEVAL PLUS

## 2024-06-21 DEVICE — ATRICLIP FLEX•V DEVICE 35
Type: IMPLANTABLE DEVICE | Site: HEART | Status: FUNCTIONAL
Brand: ATRICLIP FLEX•V

## 2024-06-21 RX ORDER — PROPOFOL 10 MG/ML
INJECTION, EMULSION INTRAVENOUS
Status: COMPLETED
Start: 2024-06-21 | End: 2024-06-21

## 2024-06-21 RX ORDER — POTASSIUM CHLORIDE 20 MEQ/1
20 TABLET, EXTENDED RELEASE ORAL EVERY 6 HOURS PRN
Status: DISCONTINUED | OUTPATIENT
Start: 2024-06-21 | End: 2024-06-22

## 2024-06-21 RX ORDER — DEXMEDETOMIDINE HYDROCHLORIDE 4 UG/ML
.1-1.5 INJECTION, SOLUTION INTRAVENOUS CONTINUOUS
Status: DISCONTINUED | OUTPATIENT
Start: 2024-06-21 | End: 2024-06-22

## 2024-06-21 RX ORDER — GLYCOPYRROLATE 0.2 MG/ML
0.6 INJECTION INTRAMUSCULAR; INTRAVENOUS ONCE
Status: COMPLETED | OUTPATIENT
Start: 2024-06-21 | End: 2024-06-21

## 2024-06-21 RX ORDER — POTASSIUM CHLORIDE 29.8 MG/ML
INJECTION INTRAVENOUS
Status: COMPLETED
Start: 2024-06-21 | End: 2024-06-21

## 2024-06-21 RX ORDER — ONDANSETRON 4 MG/1
4 TABLET, FILM COATED ORAL EVERY 8 HOURS PRN
Status: DISCONTINUED | OUTPATIENT
Start: 2024-06-21 | End: 2024-06-22

## 2024-06-21 RX ORDER — CEFAZOLIN SODIUM 2 G/100ML
2 INJECTION, SOLUTION INTRAVENOUS EVERY 8 HOURS
Status: COMPLETED | OUTPATIENT
Start: 2024-06-21 | End: 2024-06-23

## 2024-06-21 RX ORDER — CALCIUM GLUCONATE 20 MG/ML
1 INJECTION, SOLUTION INTRAVENOUS EVERY 6 HOURS PRN
Status: DISCONTINUED | OUTPATIENT
Start: 2024-06-21 | End: 2024-06-22

## 2024-06-21 RX ORDER — OXYCODONE HYDROCHLORIDE 5 MG/1
5 TABLET ORAL EVERY 4 HOURS PRN
Status: DISCONTINUED | OUTPATIENT
Start: 2024-06-21 | End: 2024-06-26

## 2024-06-21 RX ORDER — POTASSIUM CHLORIDE 1.5 G/1.58G
20 POWDER, FOR SOLUTION ORAL EVERY 6 HOURS PRN
Status: DISCONTINUED | OUTPATIENT
Start: 2024-06-21 | End: 2024-06-22

## 2024-06-21 RX ORDER — POTASSIUM CHLORIDE 1.5 G/1.58G
40 POWDER, FOR SOLUTION ORAL EVERY 6 HOURS PRN
Status: DISCONTINUED | OUTPATIENT
Start: 2024-06-21 | End: 2024-06-22

## 2024-06-21 RX ORDER — NOREPINEPHRINE BITARTRATE 0.03 MG/ML
INJECTION, SOLUTION INTRAVENOUS CONTINUOUS PRN
Status: DISCONTINUED | OUTPATIENT
Start: 2024-06-21 | End: 2024-06-21

## 2024-06-21 RX ORDER — SODIUM CHLORIDE, SODIUM LACTATE, POTASSIUM CHLORIDE, CALCIUM CHLORIDE 600; 310; 30; 20 MG/100ML; MG/100ML; MG/100ML; MG/100ML
5 INJECTION, SOLUTION INTRAVENOUS CONTINUOUS
Status: DISCONTINUED | OUTPATIENT
Start: 2024-06-21 | End: 2024-06-22

## 2024-06-21 RX ORDER — PAPAVERINE HYDROCHLORIDE 30 MG/ML
INJECTION INTRAMUSCULAR; INTRAVENOUS AS NEEDED
Status: DISCONTINUED | OUTPATIENT
Start: 2024-06-21 | End: 2024-06-21 | Stop reason: HOSPADM

## 2024-06-21 RX ORDER — ALBUMIN HUMAN 50 G/1000ML
SOLUTION INTRAVENOUS AS NEEDED
Status: DISCONTINUED | OUTPATIENT
Start: 2024-06-21 | End: 2024-06-21

## 2024-06-21 RX ORDER — ONDANSETRON HYDROCHLORIDE 2 MG/ML
4 INJECTION, SOLUTION INTRAVENOUS EVERY 8 HOURS PRN
Status: DISCONTINUED | OUTPATIENT
Start: 2024-06-21 | End: 2024-06-27 | Stop reason: HOSPADM

## 2024-06-21 RX ORDER — EPINEPHRINE HCL IN DEXTROSE 5% 4MG/250ML
0-2 PLASTIC BAG, INJECTION (ML) INTRAVENOUS CONTINUOUS
Status: DISCONTINUED | OUTPATIENT
Start: 2024-06-21 | End: 2024-06-21

## 2024-06-21 RX ORDER — SODIUM CHLORIDE, SODIUM GLUCONATE, SODIUM ACETATE, POTASSIUM CHLORIDE AND MAGNESIUM CHLORIDE 30; 37; 368; 526; 502 MG/100ML; MG/100ML; MG/100ML; MG/100ML; MG/100ML
INJECTION, SOLUTION INTRAVENOUS CONTINUOUS PRN
Status: DISCONTINUED | OUTPATIENT
Start: 2024-06-21 | End: 2024-06-21

## 2024-06-21 RX ORDER — DEXTROSE 50 % IN WATER (D50W) INTRAVENOUS SYRINGE
25
Status: DISCONTINUED | OUTPATIENT
Start: 2024-06-21 | End: 2024-06-22

## 2024-06-21 RX ORDER — DEXMEDETOMIDINE HYDROCHLORIDE 4 UG/ML
INJECTION, SOLUTION INTRAVENOUS CONTINUOUS PRN
Status: DISCONTINUED | OUTPATIENT
Start: 2024-06-21 | End: 2024-06-21

## 2024-06-21 RX ORDER — ALBUMIN HUMAN 50 G/1000ML
12.5 SOLUTION INTRAVENOUS ONCE
Status: COMPLETED | OUTPATIENT
Start: 2024-06-21 | End: 2024-06-21

## 2024-06-21 RX ORDER — SODIUM CHLORIDE 0.9 G/100ML
IRRIGANT IRRIGATION AS NEEDED
Status: DISCONTINUED | OUTPATIENT
Start: 2024-06-21 | End: 2024-06-21 | Stop reason: HOSPADM

## 2024-06-21 RX ORDER — INSULIN LISPRO 100 [IU]/ML
0-15 INJECTION, SOLUTION INTRAVENOUS; SUBCUTANEOUS EVERY 4 HOURS
Status: DISCONTINUED | OUTPATIENT
Start: 2024-06-21 | End: 2024-06-22

## 2024-06-21 RX ORDER — PROMETHAZINE HYDROCHLORIDE 25 MG/1
25 SUPPOSITORY RECTAL EVERY 12 HOURS PRN
Status: DISCONTINUED | OUTPATIENT
Start: 2024-06-21 | End: 2024-06-22

## 2024-06-21 RX ORDER — NALOXONE HYDROCHLORIDE 0.4 MG/ML
0.2 INJECTION, SOLUTION INTRAMUSCULAR; INTRAVENOUS; SUBCUTANEOUS EVERY 5 MIN PRN
Status: DISCONTINUED | OUTPATIENT
Start: 2024-06-21 | End: 2024-06-27 | Stop reason: HOSPADM

## 2024-06-21 RX ORDER — ROSUVASTATIN CALCIUM 40 MG/1
20 TABLET, COATED ORAL NIGHTLY
Status: DISCONTINUED | OUTPATIENT
Start: 2024-06-22 | End: 2024-06-25

## 2024-06-21 RX ORDER — PHENYLEPHRINE HCL IN 0.9% NACL 1 MG/10 ML
SYRINGE (ML) INTRAVENOUS AS NEEDED
Status: DISCONTINUED | OUTPATIENT
Start: 2024-06-21 | End: 2024-06-21

## 2024-06-21 RX ORDER — PROPOFOL 10 MG/ML
0-50 INJECTION, EMULSION INTRAVENOUS CONTINUOUS
Status: DISCONTINUED | OUTPATIENT
Start: 2024-06-21 | End: 2024-06-22

## 2024-06-21 RX ORDER — POTASSIUM CHLORIDE 29.8 MG/ML
40 INJECTION INTRAVENOUS ONCE
Status: COMPLETED | OUTPATIENT
Start: 2024-06-21 | End: 2024-06-21

## 2024-06-21 RX ORDER — MAGNESIUM SULFATE HEPTAHYDRATE 40 MG/ML
4 INJECTION, SOLUTION INTRAVENOUS EVERY 6 HOURS PRN
Status: DISCONTINUED | OUTPATIENT
Start: 2024-06-21 | End: 2024-06-22

## 2024-06-21 RX ORDER — EPINEPHRINE HCL IN 0.9 % NACL 4MG/250ML
PLASTIC BAG, INJECTION (ML) INTRAVENOUS CONTINUOUS PRN
Status: DISCONTINUED | OUTPATIENT
Start: 2024-06-21 | End: 2024-06-21

## 2024-06-21 RX ORDER — HEPARIN SODIUM 1000 [USP'U]/ML
INJECTION, SOLUTION INTRAVENOUS; SUBCUTANEOUS AS NEEDED
Status: DISCONTINUED | OUTPATIENT
Start: 2024-06-21 | End: 2024-06-21 | Stop reason: HOSPADM

## 2024-06-21 RX ORDER — GLYCOPYRROLATE 0.2 MG/ML
INJECTION INTRAMUSCULAR; INTRAVENOUS
Status: COMPLETED
Start: 2024-06-21 | End: 2024-06-21

## 2024-06-21 RX ORDER — LIDOCAINE HYDROCHLORIDE 20 MG/ML
INJECTION, SOLUTION INFILTRATION; PERINEURAL AS NEEDED
Status: DISCONTINUED | OUTPATIENT
Start: 2024-06-21 | End: 2024-06-21

## 2024-06-21 RX ORDER — PANTOPRAZOLE SODIUM 40 MG/10ML
40 INJECTION, POWDER, LYOPHILIZED, FOR SOLUTION INTRAVENOUS
Status: DISCONTINUED | OUTPATIENT
Start: 2024-06-22 | End: 2024-06-22

## 2024-06-21 RX ORDER — CALCIUM GLUCONATE 20 MG/ML
2 INJECTION, SOLUTION INTRAVENOUS EVERY 6 HOURS PRN
Status: DISCONTINUED | OUTPATIENT
Start: 2024-06-21 | End: 2024-06-22

## 2024-06-21 RX ORDER — EPINEPHRINE HCL IN DEXTROSE 5% 4MG/250ML
0-2 PLASTIC BAG, INJECTION (ML) INTRAVENOUS CONTINUOUS
Status: DISCONTINUED | OUTPATIENT
Start: 2024-06-21 | End: 2024-06-22

## 2024-06-21 RX ORDER — NEOSTIGMINE METHYLSULFATE 1 MG/ML
INJECTION, SOLUTION INTRAVENOUS
Status: COMPLETED
Start: 2024-06-21 | End: 2024-06-21

## 2024-06-21 RX ORDER — ACETAMINOPHEN 325 MG/1
650 TABLET ORAL EVERY 6 HOURS
Status: DISCONTINUED | OUTPATIENT
Start: 2024-06-21 | End: 2024-06-27 | Stop reason: HOSPADM

## 2024-06-21 RX ORDER — PROPOFOL 10 MG/ML
INJECTION, EMULSION INTRAVENOUS AS NEEDED
Status: DISCONTINUED | OUTPATIENT
Start: 2024-06-21 | End: 2024-06-21

## 2024-06-21 RX ORDER — AMOXICILLIN 250 MG
2 CAPSULE ORAL 2 TIMES DAILY
Status: DISCONTINUED | OUTPATIENT
Start: 2024-06-21 | End: 2024-06-27 | Stop reason: HOSPADM

## 2024-06-21 RX ORDER — ROCURONIUM BROMIDE 10 MG/ML
INJECTION, SOLUTION INTRAVENOUS AS NEEDED
Status: DISCONTINUED | OUTPATIENT
Start: 2024-06-21 | End: 2024-06-21

## 2024-06-21 RX ORDER — NEOSTIGMINE METHYLSULFATE 1 MG/ML
3 INJECTION, SOLUTION INTRAVENOUS ONCE
Status: COMPLETED | OUTPATIENT
Start: 2024-06-21 | End: 2024-06-21

## 2024-06-21 RX ORDER — OXYCODONE HYDROCHLORIDE 5 MG/1
10 TABLET ORAL EVERY 4 HOURS PRN
Status: DISCONTINUED | OUTPATIENT
Start: 2024-06-21 | End: 2024-06-25

## 2024-06-21 RX ORDER — PROMETHAZINE HYDROCHLORIDE 25 MG/1
25 TABLET ORAL EVERY 6 HOURS PRN
Status: DISCONTINUED | OUTPATIENT
Start: 2024-06-21 | End: 2024-06-22

## 2024-06-21 RX ORDER — SODIUM CHLORIDE, SODIUM LACTATE, POTASSIUM CHLORIDE, CALCIUM CHLORIDE 600; 310; 30; 20 MG/100ML; MG/100ML; MG/100ML; MG/100ML
30 INJECTION, SOLUTION INTRAVENOUS CONTINUOUS
Status: DISCONTINUED | OUTPATIENT
Start: 2024-06-21 | End: 2024-06-22

## 2024-06-21 RX ORDER — NAPROXEN SODIUM 220 MG/1
81 TABLET, FILM COATED ORAL DAILY
Status: DISCONTINUED | OUTPATIENT
Start: 2024-06-21 | End: 2024-06-25

## 2024-06-21 RX ORDER — NITROGLYCERIN 40 MG/100ML
INJECTION INTRAVENOUS AS NEEDED
Status: DISCONTINUED | OUTPATIENT
Start: 2024-06-21 | End: 2024-06-21

## 2024-06-21 RX ORDER — CEFAZOLIN 1 G/1
INJECTION, POWDER, FOR SOLUTION INTRAVENOUS AS NEEDED
Status: DISCONTINUED | OUTPATIENT
Start: 2024-06-21 | End: 2024-06-21

## 2024-06-21 RX ORDER — MAGNESIUM SULFATE HEPTAHYDRATE 40 MG/ML
2 INJECTION, SOLUTION INTRAVENOUS EVERY 6 HOURS PRN
Status: DISCONTINUED | OUTPATIENT
Start: 2024-06-21 | End: 2024-06-22

## 2024-06-21 RX ORDER — FENTANYL CITRATE 50 UG/ML
INJECTION, SOLUTION INTRAMUSCULAR; INTRAVENOUS AS NEEDED
Status: DISCONTINUED | OUTPATIENT
Start: 2024-06-21 | End: 2024-06-21

## 2024-06-21 RX ORDER — DEXTROSE 50 % IN WATER (D50W) INTRAVENOUS SYRINGE
25
Status: DISCONTINUED | OUTPATIENT
Start: 2024-06-21 | End: 2024-06-27 | Stop reason: HOSPADM

## 2024-06-21 RX ORDER — HYDROMORPHONE HYDROCHLORIDE 0.2 MG/ML
0.2 INJECTION INTRAMUSCULAR; INTRAVENOUS; SUBCUTANEOUS
Status: DISCONTINUED | OUTPATIENT
Start: 2024-06-21 | End: 2024-06-22

## 2024-06-21 RX ORDER — SODIUM CHLORIDE 9 MG/ML
INJECTION, SOLUTION INTRAVENOUS CONTINUOUS PRN
Status: DISCONTINUED | OUTPATIENT
Start: 2024-06-21 | End: 2024-06-21

## 2024-06-21 RX ORDER — PROTAMINE SULFATE 10 MG/ML
INJECTION, SOLUTION INTRAVENOUS AS NEEDED
Status: DISCONTINUED | OUTPATIENT
Start: 2024-06-21 | End: 2024-06-21

## 2024-06-21 RX ORDER — MIDAZOLAM HYDROCHLORIDE 1 MG/ML
INJECTION INTRAMUSCULAR; INTRAVENOUS AS NEEDED
Status: DISCONTINUED | OUTPATIENT
Start: 2024-06-21 | End: 2024-06-21

## 2024-06-21 RX ORDER — POLYETHYLENE GLYCOL 3350 17 G/17G
17 POWDER, FOR SOLUTION ORAL DAILY
Status: DISCONTINUED | OUTPATIENT
Start: 2024-06-21 | End: 2024-06-22

## 2024-06-21 RX ORDER — PANTOPRAZOLE SODIUM 40 MG/1
40 TABLET, DELAYED RELEASE ORAL
Status: DISCONTINUED | OUTPATIENT
Start: 2024-06-22 | End: 2024-06-22

## 2024-06-21 RX ORDER — POTASSIUM CHLORIDE 20 MEQ/1
40 TABLET, EXTENDED RELEASE ORAL EVERY 6 HOURS PRN
Status: DISCONTINUED | OUTPATIENT
Start: 2024-06-21 | End: 2024-06-22

## 2024-06-21 ASSESSMENT — PAIN - FUNCTIONAL ASSESSMENT
PAIN_FUNCTIONAL_ASSESSMENT: CPOT (CRITICAL CARE PAIN OBSERVATION TOOL)
PAIN_FUNCTIONAL_ASSESSMENT: 0-10
PAIN_FUNCTIONAL_ASSESSMENT: CPOT (CRITICAL CARE PAIN OBSERVATION TOOL)
PAIN_FUNCTIONAL_ASSESSMENT: CPOT (CRITICAL CARE PAIN OBSERVATION TOOL)

## 2024-06-21 ASSESSMENT — COLUMBIA-SUICIDE SEVERITY RATING SCALE - C-SSRS
2. HAVE YOU ACTUALLY HAD ANY THOUGHTS OF KILLING YOURSELF?: NO
1. IN THE PAST MONTH, HAVE YOU WISHED YOU WERE DEAD OR WISHED YOU COULD GO TO SLEEP AND NOT WAKE UP?: NO
6. HAVE YOU EVER DONE ANYTHING, STARTED TO DO ANYTHING, OR PREPARED TO DO ANYTHING TO END YOUR LIFE?: NO

## 2024-06-21 ASSESSMENT — PAIN SCALES - GENERAL
PAINLEVEL_OUTOF10: 0 - NO PAIN
PAINLEVEL_OUTOF10: 0 - NO PAIN

## 2024-06-21 NOTE — H&P
"History Of Present Illness  Umair Carney is a 74 y.o. male presenting with aortic stenosis and CAD.     Past Medical History  Past Medical History:   Diagnosis Date    Coronary artery disease     Hyperlipidemia     Hypertension     Prostate cancer (Multi)     s/p radiation    Skin cancer     removed    Type 2 diabetes mellitus (Multi)        Surgical History  Past Surgical History:   Procedure Laterality Date    CARDIAC CATHETERIZATION      CATARACT EXTRACTION Bilateral     COLONOSCOPY      KNEE ARTHROPLASTY Bilateral 2016        Social History  He reports that he has never smoked. He has never used smokeless tobacco. He reports that he does not currently use alcohol. He reports that he does not use drugs.    Family History  Family History   Problem Relation Name Age of Onset    Heart disease Mother      Prostate cancer Father          Allergies  Patient has no known allergies.    Review of Systems     Physical Exam     Last Recorded Vitals  Blood pressure (!) 189/93, pulse 72, temperature 36.2 °C (97.2 °F), temperature source Temporal, resp. rate 18, height 1.854 m (6' 1\"), weight 95.1 kg (209 lb 10.5 oz), SpO2 96%.    Relevant Results             Assessment/Plan   Principal Problem:    Aortic valve stenosis             I spent 20 minutes in the professional and overall care of this patient.      Usman Durant MD    "

## 2024-06-21 NOTE — ANESTHESIA POSTPROCEDURE EVALUATION
Patient: Umair Carney    Procedure Summary       Date: 06/21/24 Room / Location: [facility] OR 19 / [facility] OR    Anesthesia Start: 0729 Anesthesia Stop: 1454    Procedures:       Replacement Aortic Valve with XL Perceval Plus/ALEX clip with 35mm Atricure/CABG x3 LIMA-LAD, SVG-CX, SVG-PDA (Chest)      Replacement Aortic Valve with XL Perceval Plus/ALEX clip with 35mm Atricure/CABG x3 LIMA-LAD, SVG-CX, SVG-PDA Diagnosis:       Nonrheumatic aortic valve stenosis      (Nonrheumatic aortic valve stenosis [I35.0])    Surgeons: Usman Durant MD Responsible Provider: Ismael Nicholas MD    Anesthesia Type: general ASA Status: 4            Anesthesia Type: general    Vitals Value Taken Time   /57 06/21/24 1438   Temp 36.3 °C (97.3 °F) 06/21/24 1438   Pulse 83 06/21/24 1501   Resp 16 06/21/24 1501   SpO2 96 % 06/21/24 1501   Vitals shown include unfiled device data.    Anesthesia Post Evaluation    Patient location during evaluation: PACU  Patient participation: complete - patient participated  Level of consciousness: awake  Pain management: adequate  Airway patency: patent  Cardiovascular status: acceptable  Respiratory status: acceptable  Hydration status: acceptable  Postoperative Nausea and Vomiting: none        There were no known notable events for this encounter.

## 2024-06-21 NOTE — PROCEDURES
Central Line Insertion Practices/2nd Observer Note   Name:  Umair Carney  Admission Date:  2024  5:31 AM  MRN: [id number]  Attending Provider: Usman Durant MD  Room/Bed:  10/10-A             Sex: male  : 1950       Age: 74 y.o.    Pre-Procedure Checklist  Emergent Line Insertion: No  Type of Line to be Placed: CVC  Consent Obtained: Yes  Emergency Medication Necessary: No  Patient Identified with 2 Independent Identifiers: Yes  Review of Allergies, Anticoagulation, Relevant Labs, ECG/Telemetry: Yes  Risks/Benefits/Alternatives Discussed with Patient/POA/Legal Representative: Yes  Stop Sign on Door: Yes  Time Out Performed: Yes  Catheter Exchange: Yes  Positioning and Preparation Checklist  All People, Including Patient, in the Room with Cap and Mask: Yes  Fluoroscopy Used to Identify Vessel and Guide Insertion; Sterile Cover Used: Yes  Ultrasound Used to Identify Vessel and Guide Insertion; Sterile Cover Used: Yes  Full Barrier Precautions Followed (Mask, Cap, Gown, Gloves): Yes  Hands Washed: Yes  Monitors Attached with Sound Alarms On: Yes  Full Body Sterile Drape (Head-to-Toe) Used to Cover Patient: Yes  Trendelburg Position (For IJ and Subclavian): Yes  CHG Skin Prep Used and Allowed to Air Dry Prior to Skin Procedure: Yes  Procedure Checklist  Blood Aspirated From All Lumens, All Ports Subsequently Flushed: Yes  Catheter Caps Placed On All Lumens; Lumens Clamped: Yes  Maintain Guidewire Control Throughout, Ensuring Guidewire Removal: Yes  Maintain Sterile Field Throughout Insertion: Yes  Catheter Secured: Yes  Confirmatory Test of Venous Placement: Longitudinal ultrasound  Post-Procedure Checklist  Date and Time Written on Dressing: Yes  Sharp and Wire Count and Safe Disposal of all Sharps/Wires: Yes  Sterile Dressing Applied Per Protocol: Yes  X-ray Ordered or ECG Image: Yes    Suzette Grimes RN  Date: 2024  Time: 7:46 PM

## 2024-06-21 NOTE — BRIEF OP NOTE
Date: 2024  OR Location: [facility] OR    Name: Umair Carney, : 1950, Age: 74 y.o., MRN: [id number], Sex: male    Diagnosis  Pre-op Diagnosis     * Nonrheumatic aortic valve stenosis [I35.0] Post-op Diagnosis     * Nonrheumatic aortic valve stenosis [I35.0]     Procedures  Replacement Aortic Valve with XL Perceval Plus/ALEX clip with 35mm Atricure/CABG x3 LIMA-LAD, SVG-CX, SVG-PDA  41867 - DE REPLACE AORTIC VALVE OPEN TRANSAORTIC APPROACH    Replacement Aortic Valve with XL Perceval Plus/ALEX clip with 35mm Atricure/CABG x3 LIMA-LAD, SVG-CX, SVG-PDA  85724 - DE CABG W/ARTERIAL GRAFT FOUR/>ARTERIAL GRAFTS  Median Sternotomy   Replacement Aortic Valve with XL Perceval Plus  ALEX clip with 35mm Atricure  CABG x3 LIMA-LAD, SVG-CX, SVG-PDA      Chest Tubes/Drains: 1 left plural 2 mediastinal        Temporary Pacing Wires: A and V wires     -Settings: 80 15 0 AAI    -Underlying Rhythm: NSR     Permanent pacer/ICD: No   -Preoperative settings:    -Intra-op/ Postoperative settings:    Sternotomy performed by:  Carleen     Conduit Harvested by: Kindra GORMAN     Sternal Wires placed by: Humberto SAL     Arm/Leg/Groin Closure/Cutdown performed by: Kindra SAL     Cardio Pulmonary Bypass Time: 111 min  Cross-clamp Time: 90 min  Circulatory Arrest: No Time:     Is patient candidate for Emergency Re-sternotomy? Yes   -If yes, POD #10 is -  2024    Surgeons      * Usman Durant - Primary    Resident/Fellow/Other Assistant:  Surgeons and Role:     * Diana Galindo PA-C - JACKELINE First Assist  Kindra SAL     Procedure Summary  Anesthesia: General  ASA: IV  Anesthesia Staff: Anesthesiologist: Ismael Nicholas MD  C-AA: JORGE LUIS Echevarria; JORGE LUIS Vidal  Perfusionist: Osmin Martinez  COOPER: Jaden Seipp  Estimated Blood Loss: 250mL  Intra-op Medications:   Administrations occurring from 0715 to 1120 on 06/21/24:   Medication Name Total Dose   sodium chloride 0.9 % irrigation  solution 3,000 mL   papaverine injection 180 mg   heparin 1,000 unit/mL injection 48,000 Units              Anesthesia Record               Intraprocedure I/O Totals          Intake    Dexmedetomidine 0.00 mL    The total shown is the total volume documented since Anesthesia Start was filed.    Ketamine 0.00 mL    The total shown is the total volume documented since Anesthesia Start was filed.    plasma-lyte-A IV solution 1800.00 mL    Norepinephrine Drip 0.00 mL    The total shown is the total volume documented since Anesthesia Start was filed.    Tranexamic Acid 0.00 mL    The total shown is the total volume documented since Anesthesia Start was filed.    Insulin Drip 0.00 mL    The total shown is the total volume documented since Anesthesia Start was filed.    Epinephrine Drip 0.00 mL    The total shown is the total volume documented since Anesthesia Start was filed.    Propofol Drip 0.00 mL    The total shown is the total volume documented since Anesthesia Start was filed.    perfusion prime builder 510.00 mL    Cell Saver 1200 mL    Total Intake 3510 mL       Output    Urine 625 mL    Est. Blood Loss 250 mL    Total Output 875 mL       Net    Net Volume 2635 mL          Specimen:   ID Type Source Tests Collected by Time   1 : AORTIC VALVE LEAFLETS Tissue HEART VALVE-AORTIC SURGICAL PATHOLOGY EXAM Usman Durant MD 6/21/2024 1141        Staff:   Circulator: Brandy Horton Person: Anabella Lassiter Circulator: Massiel Lassiter Scrub: Massiel          Findings: Cad As    Complications:  None; patient tolerated the procedure well.     Disposition: ICU - intubated and hemodynamically stable.  Condition: stable  Specimens Collected:   ID Type Source Tests Collected by Time   1 : AORTIC VALVE LEAFLETS Tissue HEART VALVE-AORTIC SURGICAL PATHOLOGY EXAM Usman Duarnt MD 6/21/2024 1141     Attending Attestation: I was present and scrubbed for the entire procedure.    Usman Durant  Phone Number: [phone]

## 2024-06-21 NOTE — ANESTHESIA PREPROCEDURE EVALUATION
Patient: Umair Carney    Procedure Information       Date/Time: 06/21/24 0715    Procedures:       Replacement Aortic Valve (Chest)      Creation Bypass Graft Coronary Artery    Location: [facility] OR 19 / [facility] [place]    Surgeons: Usman Durant MD            Relevant Problems   Cardiac   (+) Aortic valve stenosis       Clinical information reviewed:   Tobacco  Allergies    Med Hx  Surg Hx   Fam Hx  Soc Hx        NPO Detail:  NPO/Void Status  Date of Last Liquid: 06/21/24  Time of Last Liquid: 0430  Date of Last Solid: 06/20/24  Time of Last Solid: 2130  Last Intake Type: Clear fluids         Physical Exam    Airway  Mallampati: II  TM distance: >3 FB  Neck ROM: full     Cardiovascular - normal exam     Dental    Pulmonary - normal exam     Abdominal - normal exam             Anesthesia Plan    History of general anesthesia?: yes  History of complications of general anesthesia?: no    ASA 4     general   (Bilateral chest wall blocks for postop pain consented.)  intravenous induction   Anesthetic plan and risks discussed with patient.  Use of blood products discussed with patient who.    Plan discussed with CAA.

## 2024-06-21 NOTE — ANESTHESIA PROCEDURE NOTES
Central Venous Line:    Date/Time: 6/21/2024 8:38 AM    A central venous line was placed in the OR for the following indication(s): central venous access and CVP monitoring.  Staffing  Performed: [facility]   Authorized by: Ismael Nicholas MD    Performed by: JORGE LUIS Echevarria    Sterility preparation included the following: provider hand hygiene performed prior to central venous catheter insertion, all 5 sterile barriers used (gloves, gown, cap, mask, large sterile drape) during central venous catheter insertion, antiseptic used during central venous catheter insertion and skin prep agent completely dried prior to procedure.  Medical reason for not performing maximal sterile barrier technique: no  The patient was placed in Trendelenburg position.    Right internal jugular vein was prepped.    The site was prepped with Chlorhexidine.  Size: 9 Fr (8 Fr)   Length: 11.5  Catheter type: introducer   Number of Lumens: double lumen    This catheter was not an oximetric catheter.    During the procedure, the following specific steps were taken: target vein identified, needle advanced into vein and blood aspirated and guidewire advanced into vein.  Seldinger technique used.  Procedure performed using ultrasound guidance.  Sterile gel and probe cover used in ultrasound-guided central venous catheter insertion.    Intravenous verification was obtained by ultrasound, venous blood return and manometry.      Post insertion care included: all ports aspirated, all ports flushed easily, guidewire removed intact, Biopatch applied, line sutured in place and dressing applied.    During the procedure the patient experienced: patient tolerated procedure well with no complications.           images stored in chart

## 2024-06-21 NOTE — H&P
CTICU History & Physical    Subjective   HPI:  Umair Carney is a 74 year old male with PMHx of HTN, HLD, Prostate cancer s/p radiation in 2016, T2DM who was referred to Dr. Durant for nonrheumatic aortic valve stenosis now s/p AVR and CABGx3 (LIMA-LAD, SVG-CX, SVG-PDA), LAAC with Dr. Durant.    Cardiac Testing:     TTE(4/16/24):  CONCLUSIONS:   1. Left ventricular systolic function is mildly decreased with a 45-50% estimated ejection fraction.   2. Spectral Doppler shows an impaired relaxation pattern of left ventricular diastolic filling.   3. There is reduced right ventricular systolic function.   4. The left atrium is moderately dilated.   5. Mildly elevated RVSP - 44.1   6. Mild aortic valve stenosis.   7. The aortic valve appears tricuspid with restriction.   8. There is global hypokinesis of the left ventricle with minor regional variations.    LHC (outside hospital LH - see PACS) - Per Dr. Durant note - patient has triple-vessel coronary disease.     Procedure/Surgeon: Dr. Durant  Frontliner/Anesthesia: Dr. Nicholas  Out of OR Time (document on ventilator card): 6844     OR Course/Issues: Mild hypotension pre-bypass. Off bypass, diminished function (estimated 25-30%). Increased to Epi 0.08 and intermittent Levo gtt with function eventually recovering to baseline 45-50%.     CPB time: 111 min  Cross clamp time: 90 min  Circ arrest time: 0 min  Echo Pre/Post: Pre EF 45-50%  Chest Tubes/Drains: 1 L pleural, 2 med Y    Temporary wires location/setting: A/V wires set at AAI @ 50. Threshold 8, sensitivity threshold 2.5      Fluids  Crystalloid: 2L   Colloid: 250cc albumin - additional 250cc givnen in CTICU on admission  Cellsaver: 1200cc   Products: none  EBL: 250cc  UOP: 625cc     Anesthesia  Intubation: Grade 1 view with DL - ET 7.5cm  Intravenous Access: R int jugular, 14G  L forearm  AICD: none  PPM: none  Regional anesthesia: SAP blocks with catheters  Benzodiazepine dose/last administration: 2mg midazolam  total  Opioid dose/last administration: 450mcg fentanyl total. Given Ketamine 1mg/kg bolus + gtt intra-op for pain  NMB dose/last administration: 220mg rocuronium total  TOF/ reversal given: none  Antibiotic time: 2g Ancef at 1232  Temperature on admission to ICU: 36.6    Past Medical History:   Diagnosis Date    Coronary artery disease     Hyperlipidemia     Hypertension     Prostate cancer (Multi)     s/p radiation    Skin cancer     removed    Type 2 diabetes mellitus (Multi)      Past Surgical History:   Procedure Laterality Date    CARDIAC CATHETERIZATION      CATARACT EXTRACTION Bilateral     COLONOSCOPY      KNEE ARTHROPLASTY Bilateral 2016     Medications Prior to Admission   Medication Sig Dispense Refill Last Dose    amLODIPine (Norvasc) 10 mg tablet Take 1 tablet (10 mg) by mouth once daily.   6/21/2024    aspirin 81 mg EC tablet Take 1 tablet (81 mg) by mouth once daily.   6/21/2024    cholecalciferol (Vitamin D-3) 50 mcg (2,000 unit) capsule Take 1 capsule (50 mcg) by mouth early in the morning..   Past Week    cyanocobalamin (Vitamin B-12) 1,000 mcg tablet Take 1 tablet (1,000 mcg) by mouth once daily.   Past Week    fenofibrate (Tricor) 145 mg tablet Take 1 tablet (145 mg) by mouth once daily.   Past Week    glimepiride (Amaryl) 2 mg tablet TAKE 1 TABLET BY MOUTH BEFORE BREAKFAST AND 1 TABLET BEFORE DINNER   Past Week    hydrALAZINE (Apresoline) 100 mg tablet Take 1 tablet (100 mg) by mouth 3 times a day.   Past Week    icosapent ethyL (Vascepa) 1 gram capsule TAKE TWO CAPSULES BY MOUTH TWO TIMES A DAY AFTER MEALS   6/20/2024    Jardiance 25 mg Take 1 tablet (25 mg) by mouth once daily.   Past Week    lisinopriL-hydrochlorothiazide 20-25 mg tablet Take 1 tablet by mouth once daily at bedtime.   Past Week    metFORMIN (Glucophage) 1,000 mg tablet TAKE ONE TABLET BY MOUTH AFTER LUNCH AND ONE TABLET AFTER DINNER   Past Week    metoprolol succinate XL (Toprol-XL) 50 mg 24 hr tablet Take 1 tablet (50 mg)  by mouth once daily at bedtime.   6/20/2024    potassium chloride CR 20 mEq ER tablet Take 1 tablet (20 mEq) by mouth 2 times a day.   6/20/2024    rosuvastatin (Crestor) 20 mg tablet Take 1 tablet (20 mg) by mouth once daily.   6/20/2024    Rybelsus 14 mg tablet tablet TAKE 1 TABLET BY MOUTH ONCE DAILY ON AN EMPTY STOMACH. DO NOT EAT OR DRINK ANYTHING AFTER FOR 30 MINUTES   Past Week    chlorhexidine (Peridex) 0.12 % solution Use 15 mL in the mouth or throat see administration instructions for 2 doses. Use the night before and morning of surgery. 473 mL 0      Patient has no known allergies.  Social History     Tobacco Use    Smoking status: Never    Smokeless tobacco: Never   Vaping Use    Vaping status: Never Used   Substance Use Topics    Alcohol use: Not Currently     Comment: approximately one per week    Drug use: Never     Family History   Problem Relation Name Age of Onset    Heart disease Mother      Prostate cancer Father         Review of Systems:  Negative except for that written in HPI    Objective   Vitals:  Most Recent:  Vitals:    06/21/24 0605   BP: (!) 189/93   Pulse: 72   Resp: 18   Temp: 36.2 °C (97.2 °F)   SpO2: 96%       24hr Min/Max:  Temp  Min: 36.2 °C (97.2 °F)  Max: 36.2 °C (97.2 °F)  Pulse  Min: 72  Max: 72  BP  Min: 189/93  Max: 189/93  Resp  Min: 18  Max: 18  SpO2  Min: 96 %  Max: 96 %    I/O:  No intake/output data recorded.    LDA:  CVC 06/21/24 Double lumen Right Internal jugular (Active)   Placement Date/Time: 06/21/24 (c) 0830   Hand Hygiene Performed Prior to CVC Insertion: Yes  Site Prep: Chlorhexidine   Site Prep Agent has Completely Dried Before Insertion: Yes  All 5 Sterile Barriers Used (Gloves, Gown, Cap, Mask, Large Sterile Beatrice...   Number of days: 0       Arterial Line 06/21/24 Left Brachial (Active)   Placement Date/Time: 06/21/24 (c) 2127   Size: 20 G  Orientation: Left  Location: Brachial  Securement Method: Transparent dressing  Patient Tolerance: Tolerated well    Number of days: 0       Pacer Wires (Active)   Placement Date: 06/21/24   Hand Hygiene Completed: Yes  Type of Pacing Wires: Atrial and Ventricular   Number of days: 0       ETT  7.5 mm (Active)   Placement Date/Time: 06/21/24 (c) 0807   Mask Ventilation: Vent by mask + OA or adjuvant +/- NMBA  Technique: Direct laryngoscopy  ETT Type: ETT - single  Single Lumen Tube Size: 7.5 mm  Cuffed: Yes  Laryngoscope: Nickei  Blade Size: 4  Location: O...   Number of days: 0       Urethral Catheter Non-latex;Temperature probe 16 Fr. (Active)   Placement Date/Time: 06/21/24 0800   Placed by: MARYLU BOYER  Hand Hygiene Completed: Yes  Catheter Type: Non-latex;Temperature probe  Tube Size (Fr.): 16 Fr.  Catheter Balloon Size: 10 mL  Urine Returned: Yes   Number of days: 0       Physical Exam:   - CONSTITUTION: Laying in bed, breathing on vent and sedated  - NEUROLOGIC: Pinpoint pupils but reactive to light  - CARDIOVASCULAR: Regular rate and rhythm, no sig murmurs. NSR on tele  - RESPIRATORY: Bilateral breath sounds without significant crackles noted  - GI: Soft, nondistended  - : Valle in place with yellow urine in valle bag  - EXTREMITIES: No sig lower extremity edema, Palpable DP pulses bilaterally  - SKIN: warm and dry  - PSYCHIATRIC: unable to assess    Lab Review:  Results from last 7 days   Lab Units 06/21/24  0620   WBC AUTO x10*3/uL 7.2   HEMOGLOBIN g/dL 15.2   HEMATOCRIT % 43.3   PLATELETS AUTO x10*3/uL 183     Results from last 7 days   Lab Units 06/21/24  0620   SODIUM mmol/L 139   POTASSIUM mmol/L 3.5   CHLORIDE mmol/L 104   CO2 mmol/L 22   BUN mg/dL 26*   CREATININE mg/dL 1.42*   CALCIUM mg/dL 9.0   PROTEIN TOTAL g/dL 6.5   BILIRUBIN TOTAL mg/dL 0.6   ALK PHOS U/L 67   ALT U/L 21   AST U/L 15   GLUCOSE mg/dL 169*     Results from last 7 days   Lab Units 06/21/24  0620   MAGNESIUM mg/dL 2.03     Results from last 7 days   Lab Units 06/21/24  1237   POCT PH, ARTERIAL pH 7.35*   POCT PCO2, ARTERIAL mm Hg 43*    POCT PO2, ARTERIAL mm Hg 281*   POCT HCO3 CALCULATED, ARTERIAL mmol/L 23.7   POCT BASE EXCESS, ARTERIAL mmol/L -1.9       Most recent labs and imaging reviewed.    Daily Risk Screen  Intubated:  Central line:  Dunn:    Assessment/Plan     Assessment:  Umair Carney is a 74 year old male with PMHx of HTN, HLD, Prostate cancer s/p radiation in 2016, T2DM who was referred to Dr. Durant for nonrheumatic aortic valve stenosis now s/p AVR and CABGx3 (LIMA-LAD, SVG-CX, SVG-PDA), LAAC with Dr. Durant.     Plan:  NEURO: No sig PMHx. Patient is intubated and sedated on propofol infusion. Acute post operative pain.   -->  - Serial neuro and pain assessments   - Continue propofol until NMB reversal, then daily sedation vacation at minimum   - Will confirm L int jugular placement and then reverse and wean  - NMB reversal when normothermic and hemodynamically stable   - Scheduled Tylenol   - PRN oxycodone  - PRN dilaudid for pain   - SAP block with catheters for pain - acute pain following  - PT Consult, OOB to chair as tolerated, chair position if not tolerated   - CAM ICU score qshift  - Sleep/wake cycle hygiene  - Hold home - no significant home meds to hold     CV:  Patient has a history of HTN, HLD, CAD Is now status post AVR, CABGx3 (LIMA-LAD, SVG-CX, SVG-PDA) Pre/Post EF: Pre 40-45%. Intra-op IDANIA with initial off pump sluggish function (estimated EF 25-30%) with subsequent improvement on Epi to EF 45-50% Arrived to CTICU on Epi 0.08 A/V epicardial wires set AAI @ 50.-->  - Pressors - Epi 0.02 and weaning  - Maintain goal MAP > 75  - Mixed venous and CI Q4H  - Volume resuscitate as clinically indicated  - Maintain epicardial wires set AAI @ 50  - Trop drawn between 1-2hrs post op for study - will obtain AM trop tomorrow morning  - If CABG is 2/2 STEMI/unstable angina, will receive Plavix POD2  - Start statin- continue Home Rosuvastatin 20mg  - Hold home Metoprolol succinate 50mg daily, Hydralazine 100mg TID, Fenofibrate  145mg, Amlodipine 10mg, Lisinopril -hydrochlorothiazide 20-25 mg daily     PULM:  No history of pulmonary disease.  Currently intubated on ventilator. Chest tubes mediastinal with 50cc in 1st hour and L pleural with 0cc output in 1st hour.-->  - F/u post op CXR - placed L int jugular line due to R int jugular being in subclavian  - Once reversed, wean ventilator settings towards CPAP & extubation   - Wean FiO2 maintaining SpO2 >92%.   - IS q1h and OOB to chair when extubated  - Chest tubes to wall suction.    GI: No sig PMHx.  OG in place.-->  - Continue PPI until extubated  - NPO, will perform bedside swallow eval post extubation   - Colace/senna BID and miralax BID     :  CSA-SID Risk Score low (3).  No history of renal disease, baseline creatinine 1.5. Creatinine stable post-op. Valle in place and making adequate UOP. -->  - Continue valle catheter for strict I/Os.  - Goal UOP 0.5ml/kg/hr  - RFP as clinically indicated  - Replete electrolytes per CTICU protocol     ENDO:  PMH of T2DM A1c: 6.7-->  - Maintain BG <180, insulin per CTICU protocol  - cardiac SSI ordered  - Home - glimepiride 2mg, Metformin 1000mg BID, Semaglutide 14mg, Jardiance 25mg     HEME:  Acute blood loss anemia and thrombocytopenia.-->    - Monitor drain output volume and characteristics  - CBC, coags, and fibrinogen post op and as clinically indicated  - Received ASA 81mg  - If CABG is 2/2 STEMI/unstable angina, will receive Plavix POD2  - SQH tomorrow   - SCDs for DVT prophylaxis.  - Last type and screen: 6/21/24     ID:  Afebrile, no current indications of infection. MRSA neg on 6/5/24.-->  - Trend temp q4h  - Periop cefazolin x 48hrs     Skin:  No active skin issues.  - preventative Mepilex dressings in place on sacrum and heels  - change preventative Mepilex weekly or more frequently as indicated (when moist/soiled)   - every shift skin assessment per nursing and weekly ICU skin rounds  - moisture barrier to be applied with claudia care  -  active skin problems addressed with nursing on daily rounds     Proph:  SCDs  PPI     G:  Line  Right IJ MAC w Minimac placed 6/21/24  Left brachial a-line placed 6/21/24    F: Family: will update at bedside postoperatively.     A,B,C,D,E,F,G: reviewed       Dispo: CTICU care for now.    CTICU TEAM PHONE [phone]    Patient seen and discussed with Dr. Mathias.    Kory Boogie, PGY-1  CTICU Team

## 2024-06-21 NOTE — PROGRESS NOTES
1/1 CTICU    CT SURGERY  06/21 AVR and CABGx3 with Dr. Carleen CHEN PLAN  TBD - Care Transitions is following to develop a safe & supportive discharge plan in collaboration with multidisciplinary team (&) patient/family/significant others.      PAYOR   Medicare A/B     PT/OT   ( ) Awaiting     COMPLETED  (X) 06/21 - Day of admit/surgery = arrived to CTIC. EPIC reviewed.     Regina Kirkland (LSW, MSW)

## 2024-06-21 NOTE — ANESTHESIA PROCEDURE NOTES
Arterial Line:    Date/Time: 6/21/2024 8:25 AM    Staffing  Performed: attending   Authorized by: Ismael Nicholas MD    Performed by: JORGE LUIS Echevarria    An arterial line was placed. Procedure performed using ultrasound guidance.in the OR for the following indication(s): continuous blood pressure monitoring and blood sampling needed.    A 20 gauge (size), 1 and 3/4 inch (length), Angiocath (type) catheter was placed into the Left brachial artery, secured by Tegadetristen   Seldinger technique used.  Events:  hematoma during insertion, patient tolerated procedure well with no complications and Attempt by COOPER, JORGE LUIS, and attending.

## 2024-06-21 NOTE — ANESTHESIA PROCEDURE NOTES
Peripheral Block    Start time: 6/21/2024 1:40 PM  End time: 6/21/2024 2:00 PM  Reason for block: at surgeon's request and post-op pain management  Staffing  Performed: attending   Authorized by: Ismael Nicholas MD    Performed by: JORGE LUIS Echevarria  Preanesthetic Checklist  Completed: patient identified, IV checked, site marked, risks and benefits discussed, surgical consent, monitors and equipment checked, pre-op evaluation and timeout performed   Timeout performed at: 6/21/2024 1:40 PM  Peripheral Block  Patient position: laying flat  Prep: ChloraPrep  Patient monitoring: heart rate, cardiac monitor and continuous pulse ox  Block type: serratus anterior and subcostal  Laterality: B/L  Injection technique: catheter  Guidance: ultrasound guided  Infiltration strength: 0.5 %  Dose: 60 mL  Needle  Needle type: Tuohy   Needle length: 8 cm  Needle localization: ultrasound guidance     image stored in chart  Assessment  Injection assessment: negative aspiration for heme, incremental injection and local visualized surrounding nerve on ultrasound  Heart rate change: no  Slow fractionated injection: yes  Additional Notes  A solution consisting of 60 ml of 0.5% Ropiv, 8 mg Decadron 400 mcg of Epinephrine, 20 ml NS was made. On each side SAP block was done at 5th midaxill line above  the 5th rib with ins of catheter secured by tunneling. Subcostal TAP block (S.S) was done on each side with 10 ml of the solution. No complications encountered.

## 2024-06-21 NOTE — RESEARCH NOTES
Patient is part of Renibus study. He will need Troponin between 1-2 hour post op and Troponin day 1 post op. He will also need CMP, Mg, CBC w dif as daily lab draws.

## 2024-06-21 NOTE — ANESTHESIA PROCEDURE NOTES
Airway  Date/Time: 6/21/2024 8:07 AM  Urgency: elective    Airway not difficult    Staffing  Performed: COOPER   Authorized by: Ismael Nicholas MD    Performed by: JORGE LUIS Echevarria  Patient location during procedure: OR    Indications and Patient Condition  Indications for airway management: anesthesia and airway protection  Sedation level: deep  Preoxygenated: yes  Patient position: sniffing  MILS not maintained throughout  Mask difficulty assessment: 2 - vent by mask + OA or adjuvant +/- NMBA    Final Airway Details  Final airway type: endotracheal airway      Successful airway: ETT  Cuffed: yes   Successful intubation technique: direct laryngoscopy  Facilitating devices/methods: intubating stylet  Endotracheal tube insertion site: oral  Blade: Nickie  Blade size: #4  ETT size (mm): 7.5  Cormack-Lehane Classification: grade I - full view of glottis  Placement verified by: chest auscultation and capnometry   Measured from: lips  ETT to lips (cm): 23  Number of attempts at approach: 1  Number of other approaches attempted: 0

## 2024-06-21 NOTE — ANESTHESIA PROCEDURE NOTES
Peripheral IV  Date/Time: 6/21/2024 8:00 AM      Placement  Needle size: 14 G  Laterality: left  Location: forearm  Site prep: alcohol  Technique: anatomical landmarks  Attempts: 1

## 2024-06-21 NOTE — CONSULTS
Umair Carney is a 74 y.o. year old male patient who presents for AVR and CABGx3 with Dr. Durant on 6/21/24. Acute Pain consulted for block for postoperative pain control.     Anticipated Postop Pain Issues -   Palliative: typically relieved with IV analgesics and regional local anesthetics  Provocative: typically with movement  Quality: typically burning and aching  Radiation: typically none  Severity: typically severe 8-10/10  Timing: typically constant    Past Medical History:   Diagnosis Date    Coronary artery disease     Hyperlipidemia     Hypertension     Prostate cancer (Multi)     s/p radiation    Skin cancer     removed    Type 2 diabetes mellitus (Multi)         Past Surgical History:   Procedure Laterality Date    CARDIAC CATHETERIZATION      CATARACT EXTRACTION Bilateral     COLONOSCOPY      KNEE ARTHROPLASTY Bilateral 2016        Family History   Problem Relation Name Age of Onset    Heart disease Mother      Prostate cancer Father          Social History     Socioeconomic History    Marital status:      Spouse name: Not on file    Number of children: Not on file    Years of education: Not on file    Highest education level: Not on file   Occupational History    Not on file   Tobacco Use    Smoking status: Never    Smokeless tobacco: Never   Vaping Use    Vaping status: Never Used   Substance and Sexual Activity    Alcohol use: Not Currently     Comment: approximately one per week    Drug use: Never    Sexual activity: Defer   Other Topics Concern    Not on file   Social History Narrative    Not on file     Social Determinants of Health     Financial Resource Strain: Not on file   Food Insecurity: Not on file   Transportation Needs: Not on file   Physical Activity: Not on file   Stress: Not on file   Social Connections: Not on file   Intimate Partner Violence: Not on file   Housing Stability: Not on file        No Known Allergies      Review of Systems  Gen: No fatigue, anorexia, insomnia,  fever.   Eyes: No vision loss, double vision, drainage, eye pain.   ENT: No pharyngitis, dry mouth, no hearing changes or ear discharge  Cardiac: No chest pain, palpitations, syncope, near syncope.   Pulmonary: No shortness of breath, cough, hemoptysis.   Heme/lymph: No swollen glands, fever, bleeding.   GI: No abdominal pain, change in bowel habits, melena, hematemesis, hematochezia, nausea, vomiting, diarrhea.   : No discharge, dysuria, frequency, urgency, hematuria.  Endo: No polyuria or weight loss.   Musculoskeletal: Negative for any pain or loss of ROM/weakness  Skin: No rashes or lesions  Neuro: Normal speech, no numbness or weakness. No gait difficulties  Review of systems is otherwise negative unless stated above or in history of present illness.    Physical Exam:  Constitutional:  no distress, alert and cooperative  Eyes: clear sclera  Head/Neck: No apparent injury, trachea midline  Respiratory/Thorax: Patent airways, thorax symmetric, breathing comfortably  Cardiovascular: no pitting edema  Gastrointestinal: Nondistended  Musculoskeletal: ROM intact  Extremities: no clubbing  Neurological: alert, alvarez x4  Psychological: Appropriate affect    Results for orders placed or performed during the hospital encounter of 06/21/24 (from the past 24 hour(s))   POCT GLUCOSE   Result Value Ref Range    POCT Glucose 176 (H) 74 - 99 mg/dL   Verify ABO/Rh Group Test (VERAB)   Result Value Ref Range    ABO TYPE A     Rh TYPE POS    Comprehensive Metabolic Panel   Result Value Ref Range    Glucose 169 (H) 74 - 99 mg/dL    Sodium 139 136 - 145 mmol/L    Potassium 3.5 3.5 - 5.3 mmol/L    Chloride 104 98 - 107 mmol/L    Bicarbonate 22 21 - 32 mmol/L    Anion Gap 17 10 - 20 mmol/L    Urea Nitrogen 26 (H) 6 - 23 mg/dL    Creatinine 1.42 (H) 0.50 - 1.30 mg/dL    eGFR 52 (L) >60 mL/min/1.73m*2    Calcium 9.0 8.6 - 10.6 mg/dL    Albumin 4.0 3.4 - 5.0 g/dL    Alkaline Phosphatase 67 33 - 136 U/L    Total Protein 6.5 6.4 - 8.2 g/dL     AST 15 9 - 39 U/L    Bilirubin, Total 0.6 0.0 - 1.2 mg/dL    ALT 21 10 - 52 U/L   CBC and Auto Differential   Result Value Ref Range    WBC 7.2 4.4 - 11.3 x10*3/uL    nRBC 0.0 0.0 - 0.0 /100 WBCs    RBC 5.30 4.50 - 5.90 x10*6/uL    Hemoglobin 15.2 13.5 - 17.5 g/dL    Hematocrit 43.3 41.0 - 52.0 %    MCV 82 80 - 100 fL    MCH 28.7 26.0 - 34.0 pg    MCHC 35.1 32.0 - 36.0 g/dL    RDW 13.6 11.5 - 14.5 %    Platelets 183 150 - 450 x10*3/uL    Neutrophils % 73.6 40.0 - 80.0 %    Immature Granulocytes %, Automated 0.8 0.0 - 0.9 %    Lymphocytes % 14.6 13.0 - 44.0 %    Monocytes % 8.8 2.0 - 10.0 %    Eosinophils % 1.9 0.0 - 6.0 %    Basophils % 0.3 0.0 - 2.0 %    Neutrophils Absolute 5.29 1.60 - 5.50 x10*3/uL    Immature Granulocytes Absolute, Automated 0.06 0.00 - 0.50 x10*3/uL    Lymphocytes Absolute 1.05 0.80 - 3.00 x10*3/uL    Monocytes Absolute 0.63 0.05 - 0.80 x10*3/uL    Eosinophils Absolute 0.14 0.00 - 0.40 x10*3/uL    Basophils Absolute 0.02 0.00 - 0.10 x10*3/uL   Magnesium   Result Value Ref Range    Magnesium 2.03 1.60 - 2.40 mg/dL   Troponin I, High Sensitivity   Result Value Ref Range    Troponin I, High Sensitivity 47 0 - 53 ng/L   Anesthesia Intraoperative Transesophageal Echocardiogram   Result Value Ref Range    LV EF 43 %   Blood Gas Arterial Full Panel Unsolicited   Result Value Ref Range    POCT pH, Arterial 7.44 (H) 7.38 - 7.42 pH    POCT pCO2, Arterial 39 38 - 42 mm Hg    POCT pO2, Arterial 83 (L) 85 - 95 mm Hg    POCT SO2, Arterial 98 94 - 100 %    POCT Oxy Hemoglobin, Arterial 96.1 94.0 - 98.0 %    POCT Hematocrit Calculated, Arterial 44.0 41.0 - 52.0 %    POCT Sodium, Arterial 137 136 - 145 mmol/L    POCT Potassium, Arterial 3.5 3.5 - 5.3 mmol/L    POCT Chloride, Arterial 105 98 - 107 mmol/L    POCT Ionized Calcium, Arterial 1.18 1.10 - 1.33 mmol/L    POCT Glucose, Arterial 169 (H) 74 - 99 mg/dL    POCT Lactate, Arterial 2.2 (H) 0.4 - 2.0 mmol/L    POCT Base Excess, Arterial 2.3 -2.0 - 3.0 mmol/L     POCT HCO3 Calculated, Arterial 26.5 (H) 22.0 - 26.0 mmol/L    POCT Hemoglobin, Arterial 14.5 13.5 - 17.5 g/dL    POCT Anion Gap, Arterial 9 (L) 10 - 25 mmo/L    Patient Temperature 37.0 degrees Celsius    FiO2 21 %   Coox Panel, Arterial Unsolicited   Result Value Ref Range    POCT Hemoglobin, Arterial 14.5 13.5 - 17.5 g/dL    POCT Oxy Hemoglobin, Arterial 96.1 94.0 - 98.0 %    POCT Carboxyhemoglobin, Arterial 1.6 %    POCT Methemoglobin, Arterial 0.7 0.0 - 1.5 %    POCT Deoxy Hemoglobin, Arterial 1.6 0.0 - 5.0 %   Blood Gas Arterial Full Panel Unsolicited   Result Value Ref Range    POCT pH, Arterial 7.35 (L) 7.38 - 7.42 pH    POCT pCO2, Arterial 48 (H) 38 - 42 mm Hg    POCT pO2, Arterial 129 (H) 85 - 95 mm Hg    POCT SO2, Arterial 100 94 - 100 %    POCT Oxy Hemoglobin, Arterial 97.2 94.0 - 98.0 %    POCT Hematocrit Calculated, Arterial 43.0 41.0 - 52.0 %    POCT Sodium, Arterial 137 136 - 145 mmol/L    POCT Potassium, Arterial 3.3 (L) 3.5 - 5.3 mmol/L    POCT Chloride, Arterial 105 98 - 107 mmol/L    POCT Ionized Calcium, Arterial 1.17 1.10 - 1.33 mmol/L    POCT Glucose, Arterial 153 (H) 74 - 99 mg/dL    POCT Lactate, Arterial 2.0 0.4 - 2.0 mmol/L    POCT Base Excess, Arterial 0.2 -2.0 - 3.0 mmol/L    POCT HCO3 Calculated, Arterial 26.5 (H) 22.0 - 26.0 mmol/L    POCT Hemoglobin, Arterial 14.4 13.5 - 17.5 g/dL    POCT Anion Gap, Arterial 9 (L) 10 - 25 mmo/L    Patient Temperature 37.0 degrees Celsius    FiO2 60 %   Coox Panel, Arterial Unsolicited   Result Value Ref Range    POCT Hemoglobin, Arterial 14.4 13.5 - 17.5 g/dL    POCT Oxy Hemoglobin, Arterial 97.2 94.0 - 98.0 %    POCT Carboxyhemoglobin, Arterial 1.8 %    POCT Methemoglobin, Arterial 0.8 0.0 - 1.5 %    POCT Deoxy Hemoglobin, Arterial 0.2 0.0 - 5.0 %   Blood Gas Arterial Full Panel Unsolicited   Result Value Ref Range    POCT pH, Arterial 7.32 (L) 7.38 - 7.42 pH    POCT pCO2, Arterial 51 (H) 38 - 42 mm Hg    POCT pO2, Arterial 154 (H) 85 - 95 mm Hg     POCT SO2, Arterial 100 94 - 100 %    POCT Oxy Hemoglobin, Arterial 97.6 94.0 - 98.0 %    POCT Hematocrit Calculated, Arterial 43.0 41.0 - 52.0 %    POCT Sodium, Arterial 137 136 - 145 mmol/L    POCT Potassium, Arterial 3.8 3.5 - 5.3 mmol/L    POCT Chloride, Arterial 105 98 - 107 mmol/L    POCT Ionized Calcium, Arterial 1.16 1.10 - 1.33 mmol/L    POCT Glucose, Arterial 153 (H) 74 - 99 mg/dL    POCT Lactate, Arterial 2.2 (H) 0.4 - 2.0 mmol/L    POCT Base Excess, Arterial -0.6 -2.0 - 3.0 mmol/L    POCT HCO3 Calculated, Arterial 26.3 (H) 22.0 - 26.0 mmol/L    POCT Hemoglobin, Arterial 14.3 13.5 - 17.5 g/dL    POCT Anion Gap, Arterial 10 10 - 25 mmo/L    Patient Temperature 37.0 degrees Celsius    FiO2 60 %   Coox Panel, Arterial Unsolicited   Result Value Ref Range    POCT Hemoglobin, Arterial 14.3 13.5 - 17.5 g/dL    POCT Oxy Hemoglobin, Arterial 97.6 94.0 - 98.0 %    POCT Carboxyhemoglobin, Arterial 1.5 %    POCT Methemoglobin, Arterial 0.7 0.0 - 1.5 %    POCT Deoxy Hemoglobin, Arterial 0.1 0.0 - 5.0 %   Blood Gas Arterial Full Panel Unsolicited   Result Value Ref Range    POCT pH, Arterial 7.31 (L) 7.38 - 7.42 pH    POCT pCO2, Arterial 47 (H) 38 - 42 mm Hg    POCT pO2, Arterial 456 (H) 85 - 95 mm Hg    POCT SO2, Arterial 100 94 - 100 %    POCT Oxy Hemoglobin, Arterial 97.7 94.0 - 98.0 %    POCT Hematocrit Calculated, Arterial 37.0 (L) 41.0 - 52.0 %    POCT Sodium, Arterial 136 136 - 145 mmol/L    POCT Potassium, Arterial 5.1 3.5 - 5.3 mmol/L    POCT Chloride, Arterial 106 98 - 107 mmol/L    POCT Ionized Calcium, Arterial 1.10 1.10 - 1.33 mmol/L    POCT Glucose, Arterial 179 (H) 74 - 99 mg/dL    POCT Lactate, Arterial 2.2 (H) 0.4 - 2.0 mmol/L    POCT Base Excess, Arterial -2.8 (L) -2.0 - 3.0 mmol/L    POCT HCO3 Calculated, Arterial 23.7 22.0 - 26.0 mmol/L    POCT Hemoglobin, Arterial 12.4 (L) 13.5 - 17.5 g/dL    POCT Anion Gap, Arterial 11 10 - 25 mmo/L    Patient Temperature 37.0 degrees Celsius    FiO2 100 %   Coox  Panel, Arterial Unsolicited   Result Value Ref Range    POCT Hemoglobin, Arterial 12.4 (L) 13.5 - 17.5 g/dL    POCT Oxy Hemoglobin, Arterial 97.7 94.0 - 98.0 %    POCT Carboxyhemoglobin, Arterial 1.5 %    POCT Methemoglobin, Arterial 0.8 0.0 - 1.5 %    POCT Deoxy Hemoglobin, Arterial 0.0 0.0 - 5.0 %   Blood Gas Venous Full Panel Unsolicited   Result Value Ref Range    POCT pH, Venous 7.27 (L) 7.33 - 7.43 pH    POCT pCO2, Venous 55 (H) 41 - 51 mm Hg    POCT pO2, Venous 49 (H) 35 - 45 mm Hg    POCT SO2, Venous 81 (H) 45 - 75 %    POCT Oxy Hemoglobin, Venous 79.1 (H) 45.0 - 75.0 %    POCT Hematocrit Calculated, Venous 36.0 (L) 41.0 - 52.0 %    POCT Sodium, Venous 137 136 - 145 mmol/L    POCT Potassium, Venous 4.8 3.5 - 5.3 mmol/L    POCT Chloride, Venous 105 98 - 107 mmol/L    POCT Ionized Calicum, Venous 1.12 1.10 - 1.33 mmol/L    POCT Glucose, Venous 178 (H) 74 - 99 mg/dL    POCT Lactate, Venous 2.1 (H) 0.4 - 2.0 mmol/L    POCT Base Excess, Venous -2.3 (L) -2.0 - 3.0 mmol/L    POCT HCO3 Calculated, Venous 25.3 22.0 - 26.0 mmol/L    POCT Hemoglobin, Venous 12.1 (L) 13.5 - 17.5 g/dL    POCT Anion Gap, Venous 12.0 10.0 - 25.0 mmol/L    Patient Temperature 37.0 degrees Celsius    FiO2 90 %   Coox Panel, Venous Unsolicited   Result Value Ref Range    POCT Carboxyhemoglobin, Venous 1.7 %    POCT Methemoglobin, Venous 0.6 0.0 - 1.5 %   Blood Gas Arterial Full Panel Unsolicited   Result Value Ref Range    POCT pH, Arterial 7.33 (L) 7.38 - 7.42 pH    POCT pCO2, Arterial 47 (H) 38 - 42 mm Hg    POCT pO2, Arterial 381 (H) 85 - 95 mm Hg    POCT SO2, Arterial 100 94 - 100 %    POCT Oxy Hemoglobin, Arterial 97.3 94.0 - 98.0 %    POCT Hematocrit Calculated, Arterial 34.0 (L) 41.0 - 52.0 %    POCT Sodium, Arterial 136 136 - 145 mmol/L    POCT Potassium, Arterial 5.6 (H) 3.5 - 5.3 mmol/L    POCT Chloride, Arterial 106 98 - 107 mmol/L    POCT Ionized Calcium, Arterial 1.07 (L) 1.10 - 1.33 mmol/L    POCT Glucose, Arterial 192 (H) 74 -  99 mg/dL    POCT Lactate, Arterial 2.0 0.4 - 2.0 mmol/L    POCT Base Excess, Arterial -1.4 -2.0 - 3.0 mmol/L    POCT HCO3 Calculated, Arterial 24.8 22.0 - 26.0 mmol/L    POCT Hemoglobin, Arterial 11.4 (L) 13.5 - 17.5 g/dL    POCT Anion Gap, Arterial 11 10 - 25 mmo/L    Patient Temperature 37.0 degrees Celsius    FiO2 90 %   Coox Panel, Arterial Unsolicited   Result Value Ref Range    POCT Hemoglobin, Arterial 11.4 (L) 13.5 - 17.5 g/dL    POCT Oxy Hemoglobin, Arterial 97.3 94.0 - 98.0 %    POCT Carboxyhemoglobin, Arterial 1.9 %    POCT Methemoglobin, Arterial 0.8 0.0 - 1.5 %    POCT Deoxy Hemoglobin, Arterial 0.0 0.0 - 5.0 %   Blood Gas Arterial Full Panel Unsolicited   Result Value Ref Range    POCT pH, Arterial 7.31 (L) 7.38 - 7.42 pH    POCT pCO2, Arterial 48 (H) 38 - 42 mm Hg    POCT pO2, Arterial 360 (H) 85 - 95 mm Hg    POCT SO2, Arterial 100 94 - 100 %    POCT Oxy Hemoglobin, Arterial 97.9 94.0 - 98.0 %    POCT Hematocrit Calculated, Arterial 33.0 (L) 41.0 - 52.0 %    POCT Sodium, Arterial 137 136 - 145 mmol/L    POCT Potassium, Arterial 4.9 3.5 - 5.3 mmol/L    POCT Chloride, Arterial 105 98 - 107 mmol/L    POCT Ionized Calcium, Arterial 1.10 1.10 - 1.33 mmol/L    POCT Glucose, Arterial 175 (H) 74 - 99 mg/dL    POCT Lactate, Arterial 2.2 (H) 0.4 - 2.0 mmol/L    POCT Base Excess, Arterial -2.3 (L) -2.0 - 3.0 mmol/L    POCT HCO3 Calculated, Arterial 24.2 22.0 - 26.0 mmol/L    POCT Hemoglobin, Arterial 11.1 (L) 13.5 - 17.5 g/dL    POCT Anion Gap, Arterial 13 10 - 25 mmo/L    Patient Temperature 37.0 degrees Celsius    FiO2 90 %   Coox Panel, Arterial Unsolicited   Result Value Ref Range    POCT Hemoglobin, Arterial 11.1 (L) 13.5 - 17.5 g/dL    POCT Oxy Hemoglobin, Arterial 97.9 94.0 - 98.0 %    POCT Carboxyhemoglobin, Arterial 1.6 %    POCT Methemoglobin, Arterial 0.5 0.0 - 1.5 %    POCT Deoxy Hemoglobin, Arterial 0.0 0.0 - 5.0 %   Blood Gas Arterial Full Panel Unsolicited   Result Value Ref Range    POCT pH,  Arterial 7.30 (L) 7.38 - 7.42 pH    POCT pCO2, Arterial 49 (H) 38 - 42 mm Hg    POCT pO2, Arterial 386 (H) 85 - 95 mm Hg    POCT SO2, Arterial 100 94 - 100 %    POCT Oxy Hemoglobin, Arterial 97.4 94.0 - 98.0 %    POCT Hematocrit Calculated, Arterial 34.0 (L) 41.0 - 52.0 %    POCT Sodium, Arterial 137 136 - 145 mmol/L    POCT Potassium, Arterial 4.9 3.5 - 5.3 mmol/L    POCT Chloride, Arterial 106 98 - 107 mmol/L    POCT Ionized Calcium, Arterial 1.12 1.10 - 1.33 mmol/L    POCT Glucose, Arterial 170 (H) 74 - 99 mg/dL    POCT Lactate, Arterial 2.5 (H) 0.4 - 2.0 mmol/L    POCT Base Excess, Arterial -2.6 (L) -2.0 - 3.0 mmol/L    POCT HCO3 Calculated, Arterial 24.1 22.0 - 26.0 mmol/L    POCT Hemoglobin, Arterial 11.3 (L) 13.5 - 17.5 g/dL    POCT Anion Gap, Arterial 12 10 - 25 mmo/L    Patient Temperature 37.0 degrees Celsius    FiO2 90 %   Coox Panel, Arterial Unsolicited   Result Value Ref Range    POCT Hemoglobin, Arterial 11.3 (L) 13.5 - 17.5 g/dL    POCT Oxy Hemoglobin, Arterial 97.4 94.0 - 98.0 %    POCT Carboxyhemoglobin, Arterial 1.7 %    POCT Methemoglobin, Arterial 0.9 0.0 - 1.5 %    POCT Deoxy Hemoglobin, Arterial 0.0 0.0 - 5.0 %   Blood Gas Arterial Full Panel Unsolicited   Result Value Ref Range    POCT pH, Arterial 7.35 (L) 7.38 - 7.42 pH    POCT pCO2, Arterial 43 (H) 38 - 42 mm Hg    POCT pO2, Arterial 281 (H) 85 - 95 mm Hg    POCT SO2, Arterial 100 94 - 100 %    POCT Oxy Hemoglobin, Arterial 97.5 94.0 - 98.0 %    POCT Hematocrit Calculated, Arterial 36.0 (L) 41.0 - 52.0 %    POCT Sodium, Arterial 136 136 - 145 mmol/L    POCT Potassium, Arterial 4.3 3.5 - 5.3 mmol/L    POCT Chloride, Arterial 106 98 - 107 mmol/L    POCT Ionized Calcium, Arterial 1.29 1.10 - 1.33 mmol/L    POCT Glucose, Arterial 195 (H) 74 - 99 mg/dL    POCT Lactate, Arterial 2.9 (H) 0.4 - 2.0 mmol/L    POCT Base Excess, Arterial -1.9 -2.0 - 3.0 mmol/L    POCT HCO3 Calculated, Arterial 23.7 22.0 - 26.0 mmol/L    POCT Hemoglobin, Arterial 11.9  (L) 13.5 - 17.5 g/dL    POCT Anion Gap, Arterial 11 10 - 25 mmo/L    Patient Temperature 37.0 degrees Celsius   Coox Panel, Arterial Unsolicited   Result Value Ref Range    POCT Hemoglobin, Arterial 11.9 (L) 13.5 - 17.5 g/dL    POCT Oxy Hemoglobin, Arterial 97.5 94.0 - 98.0 %    POCT Carboxyhemoglobin, Arterial 1.4 %    POCT Methemoglobin, Arterial 0.9 0.0 - 1.5 %    POCT Deoxy Hemoglobin, Arterial 0.1 0.0 - 5.0 %   Blood Gas Arterial Full Panel Unsolicited   Result Value Ref Range    POCT pH, Arterial 7.35 (L) 7.38 - 7.42 pH    POCT pCO2, Arterial 39 38 - 42 mm Hg    POCT pO2, Arterial 265 (H) 85 - 95 mm Hg    POCT SO2, Arterial 100 94 - 100 %    POCT Oxy Hemoglobin, Arterial 97.8 94.0 - 98.0 %    POCT Hematocrit Calculated, Arterial 37.0 (L) 41.0 - 52.0 %    POCT Sodium, Arterial 138 136 - 145 mmol/L    POCT Potassium, Arterial 3.8 3.5 - 5.3 mmol/L    POCT Chloride, Arterial 106 98 - 107 mmol/L    POCT Ionized Calcium, Arterial 1.26 1.10 - 1.33 mmol/L    POCT Glucose, Arterial 163 (H) 74 - 99 mg/dL    POCT Lactate, Arterial 3.1 (H) 0.4 - 2.0 mmol/L    POCT Base Excess, Arterial -3.8 (L) -2.0 - 3.0 mmol/L    POCT HCO3 Calculated, Arterial 21.5 (L) 22.0 - 26.0 mmol/L    POCT Hemoglobin, Arterial 12.4 (L) 13.5 - 17.5 g/dL    POCT Anion Gap, Arterial 14 10 - 25 mmo/L    Patient Temperature 37.0 degrees Celsius    FiO2 100 %   Coox Panel, Arterial Unsolicited   Result Value Ref Range    POCT Hemoglobin, Arterial 12.4 (L) 13.5 - 17.5 g/dL    POCT Oxy Hemoglobin, Arterial 97.8 94.0 - 98.0 %    POCT Carboxyhemoglobin, Arterial 1.6 %    POCT Methemoglobin, Arterial 0.6 0.0 - 1.5 %    POCT Deoxy Hemoglobin, Arterial 0.0 0.0 - 5.0 %   Blood Gas Arterial Full Panel Unsolicited   Result Value Ref Range    POCT pH, Arterial 7.33 (L) 7.38 - 7.42 pH    POCT pCO2, Arterial 42 38 - 42 mm Hg    POCT pO2, Arterial 182 (H) 85 - 95 mm Hg    POCT SO2, Arterial 100 94 - 100 %    POCT Oxy Hemoglobin, Arterial 97.6 94.0 - 98.0 %    POCT  Hematocrit Calculated, Arterial 40.0 (L) 41.0 - 52.0 %    POCT Sodium, Arterial 140 136 - 145 mmol/L    POCT Potassium, Arterial 3.5 3.5 - 5.3 mmol/L    POCT Chloride, Arterial 106 98 - 107 mmol/L    POCT Ionized Calcium, Arterial 1.19 1.10 - 1.33 mmol/L    POCT Glucose, Arterial 145 (H) 74 - 99 mg/dL    POCT Lactate, Arterial 2.6 (H) 0.4 - 2.0 mmol/L    POCT Base Excess, Arterial -3.7 (L) -2.0 - 3.0 mmol/L    POCT HCO3 Calculated, Arterial 22.1 22.0 - 26.0 mmol/L    POCT Hemoglobin, Arterial 13.2 (L) 13.5 - 17.5 g/dL    POCT Anion Gap, Arterial 15 10 - 25 mmo/L    Patient Temperature 37.0 degrees Celsius    FiO2 60 %   Coox Panel, Arterial Unsolicited   Result Value Ref Range    POCT Hemoglobin, Arterial 13.2 (L) 13.5 - 17.5 g/dL    POCT Oxy Hemoglobin, Arterial 97.6 94.0 - 98.0 %    POCT Carboxyhemoglobin, Arterial 1.7 %    POCT Methemoglobin, Arterial 0.7 0.0 - 1.5 %    POCT Deoxy Hemoglobin, Arterial 0.0 0.0 - 5.0 %        Umair JANESSA Carney is a 74 y.o. year old male patient who presents for AVR and CABGx3 with Dr. Durant on 6/21/24. Acute Pain consulted for block for postoperative pain control.   Plan:    - Bilateral SAP blocks with catheters as well as bilateral subcostal TAP blocks performed postoperatively in the OR on 6/21/24  - Ambit pump with Ropivacaine 0.2%/NaCl 0.9% 500mL, Rate 7 cc/hr bilaterally  - Ambit medication will not interfere with pain medication prescribed by the primary team.   - Please be aware of local anesthetic toxic dose and absorption variability before considering lidocaine patches  - Acute pain service will follow while catheters in place  - Rest of pain management per primary team    Acute Pain Resident  pg [phone] ph [phone]

## 2024-06-21 NOTE — PROGRESS NOTES
Pharmacy Medication History Review    Umair Carney is a 74 y.o. male admitted for Aortic valve stenosis. Pharmacy reviewed the patient's ojbxk-ap-cygeakcjt medications and allergies for accuracy.    The list below reflects the updated PTA list. Comments regarding how patient may be taking medications differently can be found in the Admit Orders Activity  Prior to Admission Medications   Prescriptions Last Dose Informant   Jardiance 25 mg Past Week Self, Spouse/Significant Other   Sig: Take 1 tablet (25 mg) by mouth once daily.   Rybelsus 14 mg tablet tablet Past Week Self, Spouse/Significant Other   Sig: TAKE 1 TABLET BY MOUTH ONCE DAILY ON AN EMPTY STOMACH. DO NOT EAT OR DRINK ANYTHING AFTER FOR 30 MINUTES   amLODIPine (Norvasc) 10 mg tablet 6/21/2024 Self, Spouse/Significant Other   Sig: Take 1 tablet (10 mg) by mouth once daily.   aspirin 81 mg EC tablet 6/21/2024 Self, Spouse/Significant Other   Sig: Take 1 tablet (81 mg) by mouth once daily.   chlorhexidine (Peridex) 0.12 % solution  Self, Spouse/Significant Other   Sig: Use 15 mL in the mouth or throat see administration instructions for 2 doses. Use the night before and morning of surgery.   cholecalciferol (Vitamin D-3) 50 mcg (2,000 unit) capsule Past Week Self, Spouse/Significant Other   Sig: Take 1 capsule (50 mcg) by mouth early in the morning..   cyanocobalamin (Vitamin B-12) 1,000 mcg tablet Past Week Self, Spouse/Significant Other   Sig: Take 1 tablet (1,000 mcg) by mouth once daily.   fenofibrate (Tricor) 145 mg tablet Past Week Self, Spouse/Significant Other   Sig: Take 1 tablet (145 mg) by mouth once daily.   glimepiride (Amaryl) 2 mg tablet Past Week Self, Spouse/Significant Other   Sig: TAKE 1 TABLET BY MOUTH BEFORE BREAKFAST AND 1 TABLET BEFORE DINNER   hydrALAZINE (Apresoline) 100 mg tablet Past Week Self, Spouse/Significant Other   Sig: Take 1 tablet (100 mg) by mouth 3 times a day.   icosapent ethyL (Vascepa) 1 gram capsule 6/20/2024 Self,  Spouse/Significant Other   Sig: TAKE TWO CAPSULES BY MOUTH TWO TIMES A DAY AFTER MEALS   lisinopriL-hydrochlorothiazide 20-25 mg tablet Past Week Self, Spouse/Significant Other   Sig: Take 1 tablet by mouth once daily at bedtime.   metFORMIN (Glucophage) 1,000 mg tablet Past Week Self, Spouse/Significant Other   Sig: TAKE ONE TABLET BY MOUTH AFTER LUNCH AND ONE TABLET AFTER DINNER   metoprolol succinate XL (Toprol-XL) 50 mg 24 hr tablet 6/20/2024 Self, Spouse/Significant Other   Sig: Take 1 tablet (50 mg) by mouth once daily at bedtime.   potassium chloride CR 20 mEq ER tablet 6/20/2024 Self, Spouse/Significant Other   Sig: Take 1 tablet (20 mEq) by mouth 2 times a day.   rosuvastatin (Crestor) 20 mg tablet 6/20/2024 Self, Spouse/Significant Other   Sig: Take 1 tablet (20 mg) by mouth once daily.      Facility-Administered Medications: None        The list below reflects the updated allergy list. Please review each documented allergy for additional clarification and justification.  Allergies  Reviewed by Dolly Martins PharmD on 6/21/2024   No Known Allergies         Patient was unable to be assessed for M2B at discharge. Pharmacy has been updated to [facility]. M2B service not offered prior to surgery, please reassess prior to patient discharge if Meds to Beds is desired.     Sources used to complete the med history include: Patient/Spouse interview - had list of medications/ Pharmacy - [facility]/ Chart review    Dolly Martins PharmD  Transitions of Care Pharmacist   [facility]  Please reach out via Secure Chat for questions, or if no response call [facility] or [facility]

## 2024-06-22 ENCOUNTER — APPOINTMENT (OUTPATIENT)
Dept: RADIOLOGY | Facility: HOSPITAL | Age: 74
DRG: 220 | End: 2024-06-22
Payer: MEDICARE

## 2024-06-22 LAB
ALBUMIN SERPL BCP-MCNC: 3.3 G/DL (ref 3.4–5)
ALBUMIN SERPL BCP-MCNC: 3.6 G/DL (ref 3.4–5)
ALP SERPL-CCNC: 42 U/L (ref 33–136)
ALT SERPL W P-5'-P-CCNC: 13 U/L (ref 10–52)
ANION GAP BLDA CALCULATED.4IONS-SCNC: 13 MMO/L (ref 10–25)
ANION GAP SERPL CALC-SCNC: 14 MMOL/L (ref 10–20)
ANION GAP SERPL CALC-SCNC: 16 MMOL/L (ref 10–20)
AST SERPL W P-5'-P-CCNC: 21 U/L (ref 9–39)
ATRIAL RATE: 78 BPM
BASE EXCESS BLDA CALC-SCNC: -2.4 MMOL/L (ref -2–3)
BASE EXCESS BLDA CALC-SCNC: -3.1 MMOL/L (ref -2–3)
BASE EXCESS BLDA CALC-SCNC: -4.4 MMOL/L (ref -2–3)
BASOPHILS # BLD AUTO: 0.01 X10*3/UL (ref 0–0.1)
BASOPHILS # BLD AUTO: 0.01 X10*3/UL (ref 0–0.1)
BASOPHILS NFR BLD AUTO: 0.1 %
BASOPHILS NFR BLD AUTO: 0.1 %
BILIRUB SERPL-MCNC: 0.4 MG/DL (ref 0–1.2)
BODY TEMPERATURE: 37 DEGREES CELSIUS
BUN SERPL-MCNC: 28 MG/DL (ref 6–23)
BUN SERPL-MCNC: 37 MG/DL (ref 6–23)
CA-I BLD-SCNC: 1.18 MMOL/L (ref 1.1–1.33)
CA-I BLDA-SCNC: 1.18 MMOL/L (ref 1.1–1.33)
CALCIUM SERPL-MCNC: 8.4 MG/DL (ref 8.6–10.6)
CALCIUM SERPL-MCNC: 8.6 MG/DL (ref 8.6–10.6)
CARDIAC TROPONIN I PNL SERPL HS: 2782 NG/L (ref 0–53)
CHLORIDE BLDA-SCNC: 109 MMOL/L (ref 98–107)
CHLORIDE SERPL-SCNC: 105 MMOL/L (ref 98–107)
CHLORIDE SERPL-SCNC: 109 MMOL/L (ref 98–107)
CO2 SERPL-SCNC: 20 MMOL/L (ref 21–32)
CO2 SERPL-SCNC: 22 MMOL/L (ref 21–32)
CREAT SERPL-MCNC: 1.76 MG/DL (ref 0.5–1.3)
CREAT SERPL-MCNC: 1.95 MG/DL (ref 0.5–1.3)
EGFRCR SERPLBLD CKD-EPI 2021: 36 ML/MIN/1.73M*2
EGFRCR SERPLBLD CKD-EPI 2021: 40 ML/MIN/1.73M*2
EOSINOPHIL # BLD AUTO: 0 X10*3/UL (ref 0–0.4)
EOSINOPHIL # BLD AUTO: 0 X10*3/UL (ref 0–0.4)
EOSINOPHIL NFR BLD AUTO: 0 %
EOSINOPHIL NFR BLD AUTO: 0 %
ERYTHROCYTE [DISTWIDTH] IN BLOOD BY AUTOMATED COUNT: 14 % (ref 11.5–14.5)
ERYTHROCYTE [DISTWIDTH] IN BLOOD BY AUTOMATED COUNT: 14.5 % (ref 11.5–14.5)
GLUCOSE BLD MANUAL STRIP-MCNC: 135 MG/DL (ref 74–99)
GLUCOSE BLD MANUAL STRIP-MCNC: 154 MG/DL (ref 74–99)
GLUCOSE BLD MANUAL STRIP-MCNC: 171 MG/DL (ref 74–99)
GLUCOSE BLD MANUAL STRIP-MCNC: 208 MG/DL (ref 74–99)
GLUCOSE BLD MANUAL STRIP-MCNC: 237 MG/DL (ref 74–99)
GLUCOSE BLD MANUAL STRIP-MCNC: 271 MG/DL (ref 74–99)
GLUCOSE BLDA-MCNC: 154 MG/DL (ref 74–99)
GLUCOSE SERPL-MCNC: 152 MG/DL (ref 74–99)
GLUCOSE SERPL-MCNC: 197 MG/DL (ref 74–99)
HCO3 BLDA-SCNC: 21 MMOL/L (ref 22–26)
HCO3 BLDA-SCNC: 21.4 MMOL/L (ref 22–26)
HCO3 BLDA-SCNC: 22 MMOL/L (ref 22–26)
HCT VFR BLD AUTO: 31.7 % (ref 41–52)
HCT VFR BLD AUTO: 32.4 % (ref 41–52)
HCT VFR BLD EST: 36 % (ref 41–52)
HGB BLD-MCNC: 11 G/DL (ref 13.5–17.5)
HGB BLD-MCNC: 11.6 G/DL (ref 13.5–17.5)
HGB BLDA-MCNC: 11.9 G/DL (ref 13.5–17.5)
IMM GRANULOCYTES # BLD AUTO: 0.06 X10*3/UL (ref 0–0.5)
IMM GRANULOCYTES # BLD AUTO: 0.11 X10*3/UL (ref 0–0.5)
IMM GRANULOCYTES NFR BLD AUTO: 0.7 % (ref 0–0.9)
IMM GRANULOCYTES NFR BLD AUTO: 0.8 % (ref 0–0.9)
INHALED O2 CONCENTRATION: 40 %
INHALED O2 CONCENTRATION: 50 %
INHALED O2 CONCENTRATION: 50 %
LACTATE BLDA-SCNC: 1.6 MMOL/L (ref 0.4–2)
LACTATE BLDV-SCNC: 2.7 MMOL/L (ref 0.4–2)
LYMPHOCYTES # BLD AUTO: 0.34 X10*3/UL (ref 0.8–3)
LYMPHOCYTES # BLD AUTO: 0.58 X10*3/UL (ref 0.8–3)
LYMPHOCYTES NFR BLD AUTO: 3.9 %
LYMPHOCYTES NFR BLD AUTO: 4.1 %
MAGNESIUM SERPL-MCNC: 2.37 MG/DL (ref 1.6–2.4)
MAGNESIUM SERPL-MCNC: 2.46 MG/DL (ref 1.6–2.4)
MCH RBC QN AUTO: 29.2 PG (ref 26–34)
MCH RBC QN AUTO: 29.3 PG (ref 26–34)
MCHC RBC AUTO-ENTMCNC: 34.7 G/DL (ref 32–36)
MCHC RBC AUTO-ENTMCNC: 35.8 G/DL (ref 32–36)
MCV RBC AUTO: 82 FL (ref 80–100)
MCV RBC AUTO: 84 FL (ref 80–100)
MONOCYTES # BLD AUTO: 0.47 X10*3/UL (ref 0.05–0.8)
MONOCYTES # BLD AUTO: 1.03 X10*3/UL (ref 0.05–0.8)
MONOCYTES NFR BLD AUTO: 5.3 %
MONOCYTES NFR BLD AUTO: 7.3 %
NEUTROPHILS # BLD AUTO: 12.38 X10*3/UL (ref 1.6–5.5)
NEUTROPHILS # BLD AUTO: 7.91 X10*3/UL (ref 1.6–5.5)
NEUTROPHILS NFR BLD AUTO: 87.7 %
NEUTROPHILS NFR BLD AUTO: 90 %
NRBC BLD-RTO: 0 /100 WBCS (ref 0–0)
NRBC BLD-RTO: 0 /100 WBCS (ref 0–0)
OXYHGB MFR BLDA: 95.8 % (ref 94–98)
OXYHGB MFR BLDA: 96.5 % (ref 94–98)
OXYHGB MFR BLDA: 97.2 % (ref 94–98)
P AXIS: 20 DEGREES
PCO2 BLDA: 33 MM HG (ref 38–42)
PCO2 BLDA: 39 MM HG (ref 38–42)
PCO2 BLDA: 39 MM HG (ref 38–42)
PH BLDA: 7.34 PH (ref 7.38–7.42)
PH BLDA: 7.36 PH (ref 7.38–7.42)
PH BLDA: 7.42 PH (ref 7.38–7.42)
PHOSPHATE SERPL-MCNC: 3.2 MG/DL (ref 2.5–4.9)
PHOSPHATE SERPL-MCNC: 3.7 MG/DL (ref 2.5–4.9)
PLATELET # BLD AUTO: 110 X10*3/UL (ref 150–450)
PLATELET # BLD AUTO: 112 X10*3/UL (ref 150–450)
PO2 BLDA: 109 MM HG (ref 85–95)
PO2 BLDA: 86 MM HG (ref 85–95)
PO2 BLDA: 96 MM HG (ref 85–95)
POTASSIUM BLDA-SCNC: 4.6 MMOL/L (ref 3.5–5.3)
POTASSIUM SERPL-SCNC: 4.1 MMOL/L (ref 3.5–5.3)
POTASSIUM SERPL-SCNC: 4.3 MMOL/L (ref 3.5–5.3)
PR INTERVAL: 176 MS
PROT SERPL-MCNC: 5.4 G/DL (ref 6.4–8.2)
Q ONSET: 211 MS
QRS COUNT: 13 BEATS
QRS DURATION: 94 MS
QT INTERVAL: 388 MS
QTC CALCULATION(BAZETT): 442 MS
QTC FREDERICIA: 423 MS
R AXIS: -39 DEGREES
RBC # BLD AUTO: 3.77 X10*6/UL (ref 4.5–5.9)
RBC # BLD AUTO: 3.96 X10*6/UL (ref 4.5–5.9)
SAO2 % BLDA: 99 % (ref 94–100)
SODIUM BLDA-SCNC: 139 MMOL/L (ref 136–145)
SODIUM SERPL-SCNC: 137 MMOL/L (ref 136–145)
SODIUM SERPL-SCNC: 141 MMOL/L (ref 136–145)
SPECIMEN DRAWN FROM PATIENT: ABNORMAL
T AXIS: -38 DEGREES
T OFFSET: 405 MS
VENTILATOR MODE: ABNORMAL
VENTRICULAR RATE: 78 BPM
WBC # BLD AUTO: 14.1 X10*3/UL (ref 4.4–11.3)
WBC # BLD AUTO: 8.8 X10*3/UL (ref 4.4–11.3)

## 2024-06-22 PROCEDURE — 83735 ASSAY OF MAGNESIUM: CPT | Mod: 91 | Performed by: NURSE PRACTITIONER

## 2024-06-22 PROCEDURE — 2500000001 HC RX 250 WO HCPCS SELF ADMINISTERED DRUGS (ALT 637 FOR MEDICARE OP)

## 2024-06-22 PROCEDURE — 71045 X-RAY EXAM CHEST 1 VIEW: CPT

## 2024-06-22 PROCEDURE — 83735 ASSAY OF MAGNESIUM: CPT | Performed by: STUDENT IN AN ORGANIZED HEALTH CARE EDUCATION/TRAINING PROGRAM

## 2024-06-22 PROCEDURE — 97161 PT EVAL LOW COMPLEX 20 MIN: CPT | Mod: GP

## 2024-06-22 PROCEDURE — 2500000002 HC RX 250 W HCPCS SELF ADMINISTERED DRUGS (ALT 637 FOR MEDICARE OP, ALT 636 FOR OP/ED): Performed by: STUDENT IN AN ORGANIZED HEALTH CARE EDUCATION/TRAINING PROGRAM

## 2024-06-22 PROCEDURE — 84484 ASSAY OF TROPONIN QUANT: CPT | Performed by: STUDENT IN AN ORGANIZED HEALTH CARE EDUCATION/TRAINING PROGRAM

## 2024-06-22 PROCEDURE — 2500000002 HC RX 250 W HCPCS SELF ADMINISTERED DRUGS (ALT 637 FOR MEDICARE OP, ALT 636 FOR OP/ED)

## 2024-06-22 PROCEDURE — 83605 ASSAY OF LACTIC ACID: CPT | Performed by: STUDENT IN AN ORGANIZED HEALTH CARE EDUCATION/TRAINING PROGRAM

## 2024-06-22 PROCEDURE — 2500000004 HC RX 250 GENERAL PHARMACY W/ HCPCS (ALT 636 FOR OP/ED)

## 2024-06-22 PROCEDURE — 37799 UNLISTED PX VASCULAR SURGERY: CPT | Performed by: STUDENT IN AN ORGANIZED HEALTH CARE EDUCATION/TRAINING PROGRAM

## 2024-06-22 PROCEDURE — 99291 CRITICAL CARE FIRST HOUR: CPT | Performed by: STUDENT IN AN ORGANIZED HEALTH CARE EDUCATION/TRAINING PROGRAM

## 2024-06-22 PROCEDURE — 84132 ASSAY OF SERUM POTASSIUM: CPT | Performed by: STUDENT IN AN ORGANIZED HEALTH CARE EDUCATION/TRAINING PROGRAM

## 2024-06-22 PROCEDURE — 82435 ASSAY OF BLOOD CHLORIDE: CPT | Performed by: NURSE PRACTITIONER

## 2024-06-22 PROCEDURE — 80069 RENAL FUNCTION PANEL: CPT | Mod: CCI | Performed by: STUDENT IN AN ORGANIZED HEALTH CARE EDUCATION/TRAINING PROGRAM

## 2024-06-22 PROCEDURE — 37799 UNLISTED PX VASCULAR SURGERY: CPT | Performed by: NURSE PRACTITIONER

## 2024-06-22 PROCEDURE — 2500000004 HC RX 250 GENERAL PHARMACY W/ HCPCS (ALT 636 FOR OP/ED): Performed by: NURSE PRACTITIONER

## 2024-06-22 PROCEDURE — 71045 X-RAY EXAM CHEST 1 VIEW: CPT | Performed by: STUDENT IN AN ORGANIZED HEALTH CARE EDUCATION/TRAINING PROGRAM

## 2024-06-22 PROCEDURE — 84100 ASSAY OF PHOSPHORUS: CPT | Performed by: STUDENT IN AN ORGANIZED HEALTH CARE EDUCATION/TRAINING PROGRAM

## 2024-06-22 PROCEDURE — 2500000001 HC RX 250 WO HCPCS SELF ADMINISTERED DRUGS (ALT 637 FOR MEDICARE OP): Performed by: STUDENT IN AN ORGANIZED HEALTH CARE EDUCATION/TRAINING PROGRAM

## 2024-06-22 PROCEDURE — 84132 ASSAY OF SERUM POTASSIUM: CPT | Performed by: NURSE PRACTITIONER

## 2024-06-22 PROCEDURE — 82947 ASSAY GLUCOSE BLOOD QUANT: CPT

## 2024-06-22 PROCEDURE — 82040 ASSAY OF SERUM ALBUMIN: CPT | Performed by: THORACIC SURGERY (CARDIOTHORACIC VASCULAR SURGERY)

## 2024-06-22 PROCEDURE — 2500000002 HC RX 250 W HCPCS SELF ADMINISTERED DRUGS (ALT 637 FOR MEDICARE OP, ALT 636 FOR OP/ED): Performed by: NURSE PRACTITIONER

## 2024-06-22 PROCEDURE — 85025 COMPLETE CBC W/AUTO DIFF WBC: CPT | Mod: 91 | Performed by: STUDENT IN AN ORGANIZED HEALTH CARE EDUCATION/TRAINING PROGRAM

## 2024-06-22 PROCEDURE — 2500000004 HC RX 250 GENERAL PHARMACY W/ HCPCS (ALT 636 FOR OP/ED): Mod: JZ

## 2024-06-22 PROCEDURE — 85025 COMPLETE CBC W/AUTO DIFF WBC: CPT | Performed by: STUDENT IN AN ORGANIZED HEALTH CARE EDUCATION/TRAINING PROGRAM

## 2024-06-22 PROCEDURE — 2500000004 HC RX 250 GENERAL PHARMACY W/ HCPCS (ALT 636 FOR OP/ED): Performed by: STUDENT IN AN ORGANIZED HEALTH CARE EDUCATION/TRAINING PROGRAM

## 2024-06-22 PROCEDURE — 99231 SBSQ HOSP IP/OBS SF/LOW 25: CPT | Performed by: STUDENT IN AN ORGANIZED HEALTH CARE EDUCATION/TRAINING PROGRAM

## 2024-06-22 PROCEDURE — 82330 ASSAY OF CALCIUM: CPT | Mod: 91 | Performed by: STUDENT IN AN ORGANIZED HEALTH CARE EDUCATION/TRAINING PROGRAM

## 2024-06-22 PROCEDURE — 1200000002 HC GENERAL ROOM WITH TELEMETRY DAILY

## 2024-06-22 RX ORDER — HEPARIN SODIUM 5000 [USP'U]/ML
5000 INJECTION, SOLUTION INTRAVENOUS; SUBCUTANEOUS EVERY 8 HOURS
Status: DISCONTINUED | OUTPATIENT
Start: 2024-06-22 | End: 2024-06-27 | Stop reason: HOSPADM

## 2024-06-22 RX ORDER — METOPROLOL TARTRATE 25 MG/1
12.5 TABLET, FILM COATED ORAL 2 TIMES DAILY
Status: DISCONTINUED | OUTPATIENT
Start: 2024-06-22 | End: 2024-06-24

## 2024-06-22 RX ORDER — POLYETHYLENE GLYCOL 3350 17 G/17G
17 POWDER, FOR SOLUTION ORAL 2 TIMES DAILY
Status: DISCONTINUED | OUTPATIENT
Start: 2024-06-22 | End: 2024-06-27 | Stop reason: HOSPADM

## 2024-06-22 RX ORDER — MULTIVIT-MIN/IRON FUM/FOLIC AC 7.5 MG-4
1 TABLET ORAL DAILY
Status: DISCONTINUED | OUTPATIENT
Start: 2024-06-23 | End: 2024-06-27 | Stop reason: HOSPADM

## 2024-06-22 RX ORDER — INSULIN LISPRO 100 [IU]/ML
0-5 INJECTION, SOLUTION INTRAVENOUS; SUBCUTANEOUS
Status: DISCONTINUED | OUTPATIENT
Start: 2024-06-22 | End: 2024-06-27 | Stop reason: HOSPADM

## 2024-06-22 ASSESSMENT — PAIN - FUNCTIONAL ASSESSMENT
PAIN_FUNCTIONAL_ASSESSMENT: 0-10
PAIN_FUNCTIONAL_ASSESSMENT: CPOT (CRITICAL CARE PAIN OBSERVATION TOOL)
PAIN_FUNCTIONAL_ASSESSMENT: 0-10

## 2024-06-22 ASSESSMENT — ACTIVITIES OF DAILY LIVING (ADL)
ADL_ASSISTANCE: INDEPENDENT
ADLS_ADDRESSED: NO

## 2024-06-22 ASSESSMENT — PAIN SCALES - GENERAL
PAINLEVEL_OUTOF10: 0 - NO PAIN
PAINLEVEL_OUTOF10: 3

## 2024-06-22 ASSESSMENT — COGNITIVE AND FUNCTIONAL STATUS - GENERAL
MOVING FROM LYING ON BACK TO SITTING ON SIDE OF FLAT BED WITH BEDRAILS: A LITTLE
WALKING IN HOSPITAL ROOM: A LITTLE
CLIMB 3 TO 5 STEPS WITH RAILING: A LITTLE
STANDING UP FROM CHAIR USING ARMS: A LITTLE
MOVING TO AND FROM BED TO CHAIR: A LITTLE
TURNING FROM BACK TO SIDE WHILE IN FLAT BAD: A LITTLE
MOBILITY SCORE: 18

## 2024-06-22 NOTE — PROGRESS NOTES
Umair Carney is a 74 y.o. year old male patient who presents for AVR and CABGx3 with Dr. Durant on 6/21/24. Acute Pain consulted for block for postoperative pain control.     Postop Pain HPI -   Palliative: relieved with IV analgesics and regional local anesthetics  Provocative: movement  Quality:  burning and aching  Radiation:  none  Severity:  3/10  Timing: constant    24-HOUR OPIOID CONSUMPTION:  none    Scheduled medications  acetaminophen, 650 mg, oral, q6h  aspirin, 81 mg, oral, Daily  ceFAZolin, 2 g, intravenous, q8h  insulin lispro, 0-15 Units, subcutaneous, q4h  polyethylene glycol, 17 g, oral, Daily  rosuvastatin, 20 mg, oral, Nightly  sennosides-docusate sodium, 2 tablet, oral, BID      Continuous medications  dexmedeTOMIDine, 0.1-1.5 mcg/kg/hr (Dosing Weight), Last Rate: Stopped (06/22/24 0730)  lactated Ringer's, 30 mL/hr, Last Rate: 30 mL/hr (06/22/24 0600)  lactated Ringer's, 5 mL/hr, Last Rate: 5 mL/hr (06/22/24 0600)      PRN medications  PRN medications: alteplase, calcium gluconate, calcium gluconate, dextrose **OR** glucagon, dextrose **OR** glucagon, magnesium sulfate, magnesium sulfate, naloxone, ondansetron **OR** ondansetron, oxyCODONE, oxyCODONE, oxygen, potassium chloride CR **OR** potassium chloride, potassium chloride CR **OR** potassium chloride, promethazine **OR** promethazine     Physical Exam:  Constitutional:  no distress, alert and cooperative  Eyes: clear sclera  Head/Neck: No apparent injury, trachea midline  Respiratory/Thorax: Patent airways, thorax symmetric, breathing comfortably  Cardiovascular: no pitting edema  Gastrointestinal: Nondistended  Musculoskeletal: ROM intact  Extremities: no clubbing  Neurological: alert, alvarez x4  Psychological: Appropriate affect    Results for orders placed or performed during the hospital encounter of 06/21/24 (from the past 24 hour(s))   Blood Gas Arterial Full Panel Unsolicited   Result Value Ref Range    POCT pH, Arterial 7.31 (L) 7.38 -  7.42 pH    POCT pCO2, Arterial 48 (H) 38 - 42 mm Hg    POCT pO2, Arterial 360 (H) 85 - 95 mm Hg    POCT SO2, Arterial 100 94 - 100 %    POCT Oxy Hemoglobin, Arterial 97.9 94.0 - 98.0 %    POCT Hematocrit Calculated, Arterial 33.0 (L) 41.0 - 52.0 %    POCT Sodium, Arterial 137 136 - 145 mmol/L    POCT Potassium, Arterial 4.9 3.5 - 5.3 mmol/L    POCT Chloride, Arterial 105 98 - 107 mmol/L    POCT Ionized Calcium, Arterial 1.10 1.10 - 1.33 mmol/L    POCT Glucose, Arterial 175 (H) 74 - 99 mg/dL    POCT Lactate, Arterial 2.2 (H) 0.4 - 2.0 mmol/L    POCT Base Excess, Arterial -2.3 (L) -2.0 - 3.0 mmol/L    POCT HCO3 Calculated, Arterial 24.2 22.0 - 26.0 mmol/L    POCT Hemoglobin, Arterial 11.1 (L) 13.5 - 17.5 g/dL    POCT Anion Gap, Arterial 13 10 - 25 mmo/L    Patient Temperature 37.0 degrees Celsius    FiO2 90 %   Coox Panel, Arterial Unsolicited   Result Value Ref Range    POCT Hemoglobin, Arterial 11.1 (L) 13.5 - 17.5 g/dL    POCT Oxy Hemoglobin, Arterial 97.9 94.0 - 98.0 %    POCT Carboxyhemoglobin, Arterial 1.6 %    POCT Methemoglobin, Arterial 0.5 0.0 - 1.5 %    POCT Deoxy Hemoglobin, Arterial 0.0 0.0 - 5.0 %   Blood Gas Arterial Full Panel Unsolicited   Result Value Ref Range    POCT pH, Arterial 7.30 (L) 7.38 - 7.42 pH    POCT pCO2, Arterial 49 (H) 38 - 42 mm Hg    POCT pO2, Arterial 386 (H) 85 - 95 mm Hg    POCT SO2, Arterial 100 94 - 100 %    POCT Oxy Hemoglobin, Arterial 97.4 94.0 - 98.0 %    POCT Hematocrit Calculated, Arterial 34.0 (L) 41.0 - 52.0 %    POCT Sodium, Arterial 137 136 - 145 mmol/L    POCT Potassium, Arterial 4.9 3.5 - 5.3 mmol/L    POCT Chloride, Arterial 106 98 - 107 mmol/L    POCT Ionized Calcium, Arterial 1.12 1.10 - 1.33 mmol/L    POCT Glucose, Arterial 170 (H) 74 - 99 mg/dL    POCT Lactate, Arterial 2.5 (H) 0.4 - 2.0 mmol/L    POCT Base Excess, Arterial -2.6 (L) -2.0 - 3.0 mmol/L    POCT HCO3 Calculated, Arterial 24.1 22.0 - 26.0 mmol/L    POCT Hemoglobin, Arterial 11.3 (L) 13.5 - 17.5 g/dL     POCT Anion Gap, Arterial 12 10 - 25 mmo/L    Patient Temperature 37.0 degrees Celsius    FiO2 90 %   Coox Panel, Arterial Unsolicited   Result Value Ref Range    POCT Hemoglobin, Arterial 11.3 (L) 13.5 - 17.5 g/dL    POCT Oxy Hemoglobin, Arterial 97.4 94.0 - 98.0 %    POCT Carboxyhemoglobin, Arterial 1.7 %    POCT Methemoglobin, Arterial 0.9 0.0 - 1.5 %    POCT Deoxy Hemoglobin, Arterial 0.0 0.0 - 5.0 %   Blood Gas Arterial Full Panel Unsolicited   Result Value Ref Range    POCT pH, Arterial 7.35 (L) 7.38 - 7.42 pH    POCT pCO2, Arterial 43 (H) 38 - 42 mm Hg    POCT pO2, Arterial 281 (H) 85 - 95 mm Hg    POCT SO2, Arterial 100 94 - 100 %    POCT Oxy Hemoglobin, Arterial 97.5 94.0 - 98.0 %    POCT Hematocrit Calculated, Arterial 36.0 (L) 41.0 - 52.0 %    POCT Sodium, Arterial 136 136 - 145 mmol/L    POCT Potassium, Arterial 4.3 3.5 - 5.3 mmol/L    POCT Chloride, Arterial 106 98 - 107 mmol/L    POCT Ionized Calcium, Arterial 1.29 1.10 - 1.33 mmol/L    POCT Glucose, Arterial 195 (H) 74 - 99 mg/dL    POCT Lactate, Arterial 2.9 (H) 0.4 - 2.0 mmol/L    POCT Base Excess, Arterial -1.9 -2.0 - 3.0 mmol/L    POCT HCO3 Calculated, Arterial 23.7 22.0 - 26.0 mmol/L    POCT Hemoglobin, Arterial 11.9 (L) 13.5 - 17.5 g/dL    POCT Anion Gap, Arterial 11 10 - 25 mmo/L    Patient Temperature 37.0 degrees Celsius   Coox Panel, Arterial Unsolicited   Result Value Ref Range    POCT Hemoglobin, Arterial 11.9 (L) 13.5 - 17.5 g/dL    POCT Oxy Hemoglobin, Arterial 97.5 94.0 - 98.0 %    POCT Carboxyhemoglobin, Arterial 1.4 %    POCT Methemoglobin, Arterial 0.9 0.0 - 1.5 %    POCT Deoxy Hemoglobin, Arterial 0.1 0.0 - 5.0 %   Blood Gas Arterial Full Panel Unsolicited   Result Value Ref Range    POCT pH, Arterial 7.35 (L) 7.38 - 7.42 pH    POCT pCO2, Arterial 39 38 - 42 mm Hg    POCT pO2, Arterial 265 (H) 85 - 95 mm Hg    POCT SO2, Arterial 100 94 - 100 %    POCT Oxy Hemoglobin, Arterial 97.8 94.0 - 98.0 %    POCT Hematocrit Calculated,  Arterial 37.0 (L) 41.0 - 52.0 %    POCT Sodium, Arterial 138 136 - 145 mmol/L    POCT Potassium, Arterial 3.8 3.5 - 5.3 mmol/L    POCT Chloride, Arterial 106 98 - 107 mmol/L    POCT Ionized Calcium, Arterial 1.26 1.10 - 1.33 mmol/L    POCT Glucose, Arterial 163 (H) 74 - 99 mg/dL    POCT Lactate, Arterial 3.1 (H) 0.4 - 2.0 mmol/L    POCT Base Excess, Arterial -3.8 (L) -2.0 - 3.0 mmol/L    POCT HCO3 Calculated, Arterial 21.5 (L) 22.0 - 26.0 mmol/L    POCT Hemoglobin, Arterial 12.4 (L) 13.5 - 17.5 g/dL    POCT Anion Gap, Arterial 14 10 - 25 mmo/L    Patient Temperature 37.0 degrees Celsius    FiO2 100 %   Coox Panel, Arterial Unsolicited   Result Value Ref Range    POCT Hemoglobin, Arterial 12.4 (L) 13.5 - 17.5 g/dL    POCT Oxy Hemoglobin, Arterial 97.8 94.0 - 98.0 %    POCT Carboxyhemoglobin, Arterial 1.6 %    POCT Methemoglobin, Arterial 0.6 0.0 - 1.5 %    POCT Deoxy Hemoglobin, Arterial 0.0 0.0 - 5.0 %   Blood Gas Arterial Full Panel Unsolicited   Result Value Ref Range    POCT pH, Arterial 7.33 (L) 7.38 - 7.42 pH    POCT pCO2, Arterial 42 38 - 42 mm Hg    POCT pO2, Arterial 182 (H) 85 - 95 mm Hg    POCT SO2, Arterial 100 94 - 100 %    POCT Oxy Hemoglobin, Arterial 97.6 94.0 - 98.0 %    POCT Hematocrit Calculated, Arterial 40.0 (L) 41.0 - 52.0 %    POCT Sodium, Arterial 140 136 - 145 mmol/L    POCT Potassium, Arterial 3.5 3.5 - 5.3 mmol/L    POCT Chloride, Arterial 106 98 - 107 mmol/L    POCT Ionized Calcium, Arterial 1.19 1.10 - 1.33 mmol/L    POCT Glucose, Arterial 145 (H) 74 - 99 mg/dL    POCT Lactate, Arterial 2.6 (H) 0.4 - 2.0 mmol/L    POCT Base Excess, Arterial -3.7 (L) -2.0 - 3.0 mmol/L    POCT HCO3 Calculated, Arterial 22.1 22.0 - 26.0 mmol/L    POCT Hemoglobin, Arterial 13.2 (L) 13.5 - 17.5 g/dL    POCT Anion Gap, Arterial 15 10 - 25 mmo/L    Patient Temperature 37.0 degrees Celsius    FiO2 60 %   Coox Panel, Arterial Unsolicited   Result Value Ref Range    POCT Hemoglobin, Arterial 13.2 (L) 13.5 - 17.5  g/dL    POCT Oxy Hemoglobin, Arterial 97.6 94.0 - 98.0 %    POCT Carboxyhemoglobin, Arterial 1.7 %    POCT Methemoglobin, Arterial 0.7 0.0 - 1.5 %    POCT Deoxy Hemoglobin, Arterial 0.0 0.0 - 5.0 %   Calcium, Ionized   Result Value Ref Range    POCT Calcium, Ionized 1.10 1.1 - 1.33 mmol/L   Magnesium   Result Value Ref Range    Magnesium 2.61 (H) 1.60 - 2.40 mg/dL   Coagulation Screen   Result Value Ref Range    Protime 12.7 9.8 - 12.8 seconds    INR 1.1 0.9 - 1.1    aPTT 28 27 - 38 seconds   Fibrinogen   Result Value Ref Range    Fibrinogen 233 200 - 400 mg/dL   CBC   Result Value Ref Range    WBC 26.3 (H) 4.4 - 11.3 x10*3/uL    nRBC 0.0 0.0 - 0.0 /100 WBCs    RBC 4.51 4.50 - 5.90 x10*6/uL    Hemoglobin 13.2 (L) 13.5 - 17.5 g/dL    Hematocrit 37.3 (L) 41.0 - 52.0 %    MCV 83 80 - 100 fL    MCH 29.3 26.0 - 34.0 pg    MCHC 35.4 32.0 - 36.0 g/dL    RDW 14.0 11.5 - 14.5 %    Platelets 202 150 - 450 x10*3/uL   Renal Function Panel   Result Value Ref Range    Glucose 133 (H) 74 - 99 mg/dL    Sodium 141 136 - 145 mmol/L    Potassium 3.3 (L) 3.5 - 5.3 mmol/L    Chloride 107 98 - 107 mmol/L    Bicarbonate 23 21 - 32 mmol/L    Anion Gap 14 10 - 20 mmol/L    Urea Nitrogen 24 (H) 6 - 23 mg/dL    Creatinine 1.85 (H) 0.50 - 1.30 mg/dL    eGFR 38 (L) >60 mL/min/1.73m*2    Calcium 8.2 (L) 8.6 - 10.6 mg/dL    Phosphorus 3.1 2.5 - 4.9 mg/dL    Albumin 3.5 3.4 - 5.0 g/dL   Troponin I, High Sensitivity   Result Value Ref Range    Troponin I, High Sensitivity 2,066 (HH) 0 - 53 ng/L   POCT GLUCOSE   Result Value Ref Range    POCT Glucose 195 (H) 74 - 99 mg/dL   Blood gas arterial   Result Value Ref Range    POCT pH, Arterial 7.43 (H) 7.38 - 7.42 pH    POCT pCO2, Arterial 31 (L) 38 - 42 mm Hg    POCT pO2, Arterial 132 (H) 85 - 95 mm Hg    POCT SO2, Arterial 99 94 - 100 %    POCT Oxy Hemoglobin, Arterial 97.5 94.0 - 98.0 %    POCT Base Excess, Arterial -2.7 (L) -2.0 - 3.0 mmol/L    POCT HCO3 Calculated, Arterial 20.6 (L) 22.0 - 26.0  mmol/L    Patient Temperature 37.0 degrees Celsius    FiO2 50 %    Ventilator Mode A/C PC     Site of Arterial Puncture Arterial Line    Blood Gas Arterial Full Panel   Result Value Ref Range    POCT pH, Arterial 7.43 (H) 7.38 - 7.42 pH    POCT pCO2, Arterial 31 (L) 38 - 42 mm Hg    POCT pO2, Arterial 130 (H) 85 - 95 mm Hg    POCT SO2, Arterial 99 94 - 100 %    POCT Oxy Hemoglobin, Arterial 97.2 94.0 - 98.0 %    POCT Hematocrit Calculated, Arterial 37.0 (L) 41.0 - 52.0 %    POCT Sodium, Arterial 138 136 - 145 mmol/L    POCT Potassium, Arterial 5.0 3.5 - 5.3 mmol/L    POCT Chloride, Arterial 110 (H) 98 - 107 mmol/L    POCT Ionized Calcium, Arterial 1.16 1.10 - 1.33 mmol/L    POCT Glucose, Arterial 178 (H) 74 - 99 mg/dL    POCT Lactate, Arterial 2.3 (H) 0.4 - 2.0 mmol/L    POCT Base Excess, Arterial -2.9 (L) -2.0 - 3.0 mmol/L    POCT HCO3 Calculated, Arterial 20.6 (L) 22.0 - 26.0 mmol/L    POCT Hemoglobin, Arterial 12.2 (L) 13.5 - 17.5 g/dL    POCT Anion Gap, Arterial 12 10 - 25 mmo/L    Patient Temperature 37.0 degrees Celsius    FiO2 50 %    Ventilator Mode A/C PC     Site of Arterial Puncture Arterial Line    POCT GLUCOSE   Result Value Ref Range    POCT Glucose 190 (H) 74 - 99 mg/dL   ECG 12 Lead   Result Value Ref Range    Ventricular Rate 78 BPM    Atrial Rate 78 BPM    ND Interval 176 ms    QRS Duration 94 ms    QT Interval 388 ms    QTC Calculation(Bazett) 442 ms    P Axis 20 degrees    R Axis -39 degrees    T Axis -38 degrees    QRS Count 13 beats    Q Onset 211 ms    T Offset 405 ms    QTC Fredericia 423 ms   POCT GLUCOSE   Result Value Ref Range    POCT Glucose 171 (H) 74 - 99 mg/dL   Calcium, Ionized   Result Value Ref Range    POCT Calcium, Ionized 1.18 1.1 - 1.33 mmol/L   Magnesium   Result Value Ref Range    Magnesium 2.46 (H) 1.60 - 2.40 mg/dL   CBC and Auto Differential   Result Value Ref Range    WBC 8.8 4.4 - 11.3 x10*3/uL    nRBC 0.0 0.0 - 0.0 /100 WBCs    RBC 3.96 (L) 4.50 - 5.90 x10*6/uL     Hemoglobin 11.6 (L) 13.5 - 17.5 g/dL    Hematocrit 32.4 (L) 41.0 - 52.0 %    MCV 82 80 - 100 fL    MCH 29.3 26.0 - 34.0 pg    MCHC 35.8 32.0 - 36.0 g/dL    RDW 14.0 11.5 - 14.5 %    Platelets 112 (L) 150 - 450 x10*3/uL    Neutrophils % 90.0 40.0 - 80.0 %    Immature Granulocytes %, Automated 0.7 0.0 - 0.9 %    Lymphocytes % 3.9 13.0 - 44.0 %    Monocytes % 5.3 2.0 - 10.0 %    Eosinophils % 0.0 0.0 - 6.0 %    Basophils % 0.1 0.0 - 2.0 %    Neutrophils Absolute 7.91 (H) 1.60 - 5.50 x10*3/uL    Immature Granulocytes Absolute, Automated 0.06 0.00 - 0.50 x10*3/uL    Lymphocytes Absolute 0.34 (L) 0.80 - 3.00 x10*3/uL    Monocytes Absolute 0.47 0.05 - 0.80 x10*3/uL    Eosinophils Absolute 0.00 0.00 - 0.40 x10*3/uL    Basophils Absolute 0.01 0.00 - 0.10 x10*3/uL   Troponin I, High Sensitivity   Result Value Ref Range    Troponin I, High Sensitivity 2,782 (HH) 0 - 53 ng/L   Comprehensive metabolic panel   Result Value Ref Range    Glucose 152 (H) 74 - 99 mg/dL    Sodium 141 136 - 145 mmol/L    Potassium 4.3 3.5 - 5.3 mmol/L    Chloride 109 (H) 98 - 107 mmol/L    Bicarbonate 22 21 - 32 mmol/L    Anion Gap 14 10 - 20 mmol/L    Urea Nitrogen 28 (H) 6 - 23 mg/dL    Creatinine 1.76 (H) 0.50 - 1.30 mg/dL    eGFR 40 (L) >60 mL/min/1.73m*2    Calcium 8.6 8.6 - 10.6 mg/dL    Albumin 3.6 3.4 - 5.0 g/dL    Alkaline Phosphatase 42 33 - 136 U/L    Total Protein 5.4 (L) 6.4 - 8.2 g/dL    AST 21 9 - 39 U/L    Bilirubin, Total 0.4 0.0 - 1.2 mg/dL    ALT 13 10 - 52 U/L   Phosphorus   Result Value Ref Range    Phosphorus 3.2 2.5 - 4.9 mg/dL   Blood Gas Arterial Full Panel   Result Value Ref Range    POCT pH, Arterial 7.42 7.38 - 7.42 pH    POCT pCO2, Arterial 33 (L) 38 - 42 mm Hg    POCT pO2, Arterial 109 (H) 85 - 95 mm Hg    POCT SO2, Arterial 99 94 - 100 %    POCT Oxy Hemoglobin, Arterial 97.2 94.0 - 98.0 %    POCT Hematocrit Calculated, Arterial 36.0 (L) 41.0 - 52.0 %    POCT Sodium, Arterial 139 136 - 145 mmol/L    POCT Potassium,  Arterial 4.6 3.5 - 5.3 mmol/L    POCT Chloride, Arterial 109 (H) 98 - 107 mmol/L    POCT Ionized Calcium, Arterial 1.18 1.10 - 1.33 mmol/L    POCT Glucose, Arterial 154 (H) 74 - 99 mg/dL    POCT Lactate, Arterial 1.6 0.4 - 2.0 mmol/L    POCT Base Excess, Arterial -2.4 (L) -2.0 - 3.0 mmol/L    POCT HCO3 Calculated, Arterial 21.4 (L) 22.0 - 26.0 mmol/L    POCT Hemoglobin, Arterial 11.9 (L) 13.5 - 17.5 g/dL    POCT Anion Gap, Arterial 13 10 - 25 mmo/L    Patient Temperature 37.0 degrees Celsius    FiO2 40 %    Ventilator Mode CPAP     Site of Arterial Puncture Arterial Line    POCT GLUCOSE   Result Value Ref Range    POCT Glucose 154 (H) 74 - 99 mg/dL   POCT GLUCOSE   Result Value Ref Range    POCT Glucose 135 (H) 74 - 99 mg/dL            Past Medical History:   Diagnosis Date    Coronary artery disease     Hyperlipidemia     Hypertension     Prostate cancer (Multi)     s/p radiation    Skin cancer     removed    Type 2 diabetes mellitus (Multi)         Past Surgical History:   Procedure Laterality Date    CARDIAC CATHETERIZATION      CATARACT EXTRACTION Bilateral     COLONOSCOPY      KNEE ARTHROPLASTY Bilateral 2016        Family History   Problem Relation Name Age of Onset    Heart disease Mother      Prostate cancer Father          Social History     Socioeconomic History    Marital status:      Spouse name: Not on file    Number of children: Not on file    Years of education: Not on file    Highest education level: Not on file   Occupational History    Not on file   Tobacco Use    Smoking status: Never    Smokeless tobacco: Never   Vaping Use    Vaping status: Never Used   Substance and Sexual Activity    Alcohol use: Not Currently     Comment: approximately one per week    Drug use: Never    Sexual activity: Defer   Other Topics Concern    Not on file   Social History Narrative    Not on file     Social Determinants of Health     Financial Resource Strain: Not on file   Food Insecurity: Not on file    Transportation Needs: Not on file   Physical Activity: Not on file   Stress: Not on file   Social Connections: Not on file   Intimate Partner Violence: Not on file   Housing Stability: Not on file        No Known Allergies      Review of Systems  Gen: No fatigue, anorexia, insomnia, fever.   Eyes: No vision loss, double vision, drainage, eye pain.   ENT: No pharyngitis, dry mouth, no hearing changes or ear discharge  Cardiac: No chest pain, palpitations, syncope, near syncope.   Pulmonary: No shortness of breath, cough, hemoptysis.   Heme/lymph: No swollen glands, fever, bleeding.   GI: No abdominal pain, change in bowel habits, melena, hematemesis, hematochezia, nausea, vomiting, diarrhea.   : No discharge, dysuria, frequency, urgency, hematuria.  Endo: No polyuria or weight loss.   Musculoskeletal: Negative for any pain or loss of ROM/weakness  Skin: No rashes or lesions  Neuro: Normal speech, no numbness or weakness. No gait difficulties  Review of systems is otherwise negative unless stated above or in history of present illness.    Physical Exam:  Constitutional:  no distress, alert and cooperative  Eyes: clear sclera  Head/Neck: No apparent injury, trachea midline  Respiratory/Thorax: Patent airways, thorax symmetric, breathing comfortably  Cardiovascular: no pitting edema  Gastrointestinal: Nondistended  Musculoskeletal: ROM intact  Extremities: no clubbing  Neurological: alert, alvarez x4  Psychological: Appropriate affect    Results for orders placed or performed during the hospital encounter of 06/21/24 (from the past 24 hour(s))   Blood Gas Arterial Full Panel Unsolicited   Result Value Ref Range    POCT pH, Arterial 7.31 (L) 7.38 - 7.42 pH    POCT pCO2, Arterial 48 (H) 38 - 42 mm Hg    POCT pO2, Arterial 360 (H) 85 - 95 mm Hg    POCT SO2, Arterial 100 94 - 100 %    POCT Oxy Hemoglobin, Arterial 97.9 94.0 - 98.0 %    POCT Hematocrit Calculated, Arterial 33.0 (L) 41.0 - 52.0 %    POCT Sodium, Arterial 137  136 - 145 mmol/L    POCT Potassium, Arterial 4.9 3.5 - 5.3 mmol/L    POCT Chloride, Arterial 105 98 - 107 mmol/L    POCT Ionized Calcium, Arterial 1.10 1.10 - 1.33 mmol/L    POCT Glucose, Arterial 175 (H) 74 - 99 mg/dL    POCT Lactate, Arterial 2.2 (H) 0.4 - 2.0 mmol/L    POCT Base Excess, Arterial -2.3 (L) -2.0 - 3.0 mmol/L    POCT HCO3 Calculated, Arterial 24.2 22.0 - 26.0 mmol/L    POCT Hemoglobin, Arterial 11.1 (L) 13.5 - 17.5 g/dL    POCT Anion Gap, Arterial 13 10 - 25 mmo/L    Patient Temperature 37.0 degrees Celsius    FiO2 90 %   Coox Panel, Arterial Unsolicited   Result Value Ref Range    POCT Hemoglobin, Arterial 11.1 (L) 13.5 - 17.5 g/dL    POCT Oxy Hemoglobin, Arterial 97.9 94.0 - 98.0 %    POCT Carboxyhemoglobin, Arterial 1.6 %    POCT Methemoglobin, Arterial 0.5 0.0 - 1.5 %    POCT Deoxy Hemoglobin, Arterial 0.0 0.0 - 5.0 %   Blood Gas Arterial Full Panel Unsolicited   Result Value Ref Range    POCT pH, Arterial 7.30 (L) 7.38 - 7.42 pH    POCT pCO2, Arterial 49 (H) 38 - 42 mm Hg    POCT pO2, Arterial 386 (H) 85 - 95 mm Hg    POCT SO2, Arterial 100 94 - 100 %    POCT Oxy Hemoglobin, Arterial 97.4 94.0 - 98.0 %    POCT Hematocrit Calculated, Arterial 34.0 (L) 41.0 - 52.0 %    POCT Sodium, Arterial 137 136 - 145 mmol/L    POCT Potassium, Arterial 4.9 3.5 - 5.3 mmol/L    POCT Chloride, Arterial 106 98 - 107 mmol/L    POCT Ionized Calcium, Arterial 1.12 1.10 - 1.33 mmol/L    POCT Glucose, Arterial 170 (H) 74 - 99 mg/dL    POCT Lactate, Arterial 2.5 (H) 0.4 - 2.0 mmol/L    POCT Base Excess, Arterial -2.6 (L) -2.0 - 3.0 mmol/L    POCT HCO3 Calculated, Arterial 24.1 22.0 - 26.0 mmol/L    POCT Hemoglobin, Arterial 11.3 (L) 13.5 - 17.5 g/dL    POCT Anion Gap, Arterial 12 10 - 25 mmo/L    Patient Temperature 37.0 degrees Celsius    FiO2 90 %   Coox Panel, Arterial Unsolicited   Result Value Ref Range    POCT Hemoglobin, Arterial 11.3 (L) 13.5 - 17.5 g/dL    POCT Oxy Hemoglobin, Arterial 97.4 94.0 - 98.0 %    POCT  Carboxyhemoglobin, Arterial 1.7 %    POCT Methemoglobin, Arterial 0.9 0.0 - 1.5 %    POCT Deoxy Hemoglobin, Arterial 0.0 0.0 - 5.0 %   Blood Gas Arterial Full Panel Unsolicited   Result Value Ref Range    POCT pH, Arterial 7.35 (L) 7.38 - 7.42 pH    POCT pCO2, Arterial 43 (H) 38 - 42 mm Hg    POCT pO2, Arterial 281 (H) 85 - 95 mm Hg    POCT SO2, Arterial 100 94 - 100 %    POCT Oxy Hemoglobin, Arterial 97.5 94.0 - 98.0 %    POCT Hematocrit Calculated, Arterial 36.0 (L) 41.0 - 52.0 %    POCT Sodium, Arterial 136 136 - 145 mmol/L    POCT Potassium, Arterial 4.3 3.5 - 5.3 mmol/L    POCT Chloride, Arterial 106 98 - 107 mmol/L    POCT Ionized Calcium, Arterial 1.29 1.10 - 1.33 mmol/L    POCT Glucose, Arterial 195 (H) 74 - 99 mg/dL    POCT Lactate, Arterial 2.9 (H) 0.4 - 2.0 mmol/L    POCT Base Excess, Arterial -1.9 -2.0 - 3.0 mmol/L    POCT HCO3 Calculated, Arterial 23.7 22.0 - 26.0 mmol/L    POCT Hemoglobin, Arterial 11.9 (L) 13.5 - 17.5 g/dL    POCT Anion Gap, Arterial 11 10 - 25 mmo/L    Patient Temperature 37.0 degrees Celsius   Coox Panel, Arterial Unsolicited   Result Value Ref Range    POCT Hemoglobin, Arterial 11.9 (L) 13.5 - 17.5 g/dL    POCT Oxy Hemoglobin, Arterial 97.5 94.0 - 98.0 %    POCT Carboxyhemoglobin, Arterial 1.4 %    POCT Methemoglobin, Arterial 0.9 0.0 - 1.5 %    POCT Deoxy Hemoglobin, Arterial 0.1 0.0 - 5.0 %   Blood Gas Arterial Full Panel Unsolicited   Result Value Ref Range    POCT pH, Arterial 7.35 (L) 7.38 - 7.42 pH    POCT pCO2, Arterial 39 38 - 42 mm Hg    POCT pO2, Arterial 265 (H) 85 - 95 mm Hg    POCT SO2, Arterial 100 94 - 100 %    POCT Oxy Hemoglobin, Arterial 97.8 94.0 - 98.0 %    POCT Hematocrit Calculated, Arterial 37.0 (L) 41.0 - 52.0 %    POCT Sodium, Arterial 138 136 - 145 mmol/L    POCT Potassium, Arterial 3.8 3.5 - 5.3 mmol/L    POCT Chloride, Arterial 106 98 - 107 mmol/L    POCT Ionized Calcium, Arterial 1.26 1.10 - 1.33 mmol/L    POCT Glucose, Arterial 163 (H) 74 - 99 mg/dL     POCT Lactate, Arterial 3.1 (H) 0.4 - 2.0 mmol/L    POCT Base Excess, Arterial -3.8 (L) -2.0 - 3.0 mmol/L    POCT HCO3 Calculated, Arterial 21.5 (L) 22.0 - 26.0 mmol/L    POCT Hemoglobin, Arterial 12.4 (L) 13.5 - 17.5 g/dL    POCT Anion Gap, Arterial 14 10 - 25 mmo/L    Patient Temperature 37.0 degrees Celsius    FiO2 100 %   Coox Panel, Arterial Unsolicited   Result Value Ref Range    POCT Hemoglobin, Arterial 12.4 (L) 13.5 - 17.5 g/dL    POCT Oxy Hemoglobin, Arterial 97.8 94.0 - 98.0 %    POCT Carboxyhemoglobin, Arterial 1.6 %    POCT Methemoglobin, Arterial 0.6 0.0 - 1.5 %    POCT Deoxy Hemoglobin, Arterial 0.0 0.0 - 5.0 %   Blood Gas Arterial Full Panel Unsolicited   Result Value Ref Range    POCT pH, Arterial 7.33 (L) 7.38 - 7.42 pH    POCT pCO2, Arterial 42 38 - 42 mm Hg    POCT pO2, Arterial 182 (H) 85 - 95 mm Hg    POCT SO2, Arterial 100 94 - 100 %    POCT Oxy Hemoglobin, Arterial 97.6 94.0 - 98.0 %    POCT Hematocrit Calculated, Arterial 40.0 (L) 41.0 - 52.0 %    POCT Sodium, Arterial 140 136 - 145 mmol/L    POCT Potassium, Arterial 3.5 3.5 - 5.3 mmol/L    POCT Chloride, Arterial 106 98 - 107 mmol/L    POCT Ionized Calcium, Arterial 1.19 1.10 - 1.33 mmol/L    POCT Glucose, Arterial 145 (H) 74 - 99 mg/dL    POCT Lactate, Arterial 2.6 (H) 0.4 - 2.0 mmol/L    POCT Base Excess, Arterial -3.7 (L) -2.0 - 3.0 mmol/L    POCT HCO3 Calculated, Arterial 22.1 22.0 - 26.0 mmol/L    POCT Hemoglobin, Arterial 13.2 (L) 13.5 - 17.5 g/dL    POCT Anion Gap, Arterial 15 10 - 25 mmo/L    Patient Temperature 37.0 degrees Celsius    FiO2 60 %   Coox Panel, Arterial Unsolicited   Result Value Ref Range    POCT Hemoglobin, Arterial 13.2 (L) 13.5 - 17.5 g/dL    POCT Oxy Hemoglobin, Arterial 97.6 94.0 - 98.0 %    POCT Carboxyhemoglobin, Arterial 1.7 %    POCT Methemoglobin, Arterial 0.7 0.0 - 1.5 %    POCT Deoxy Hemoglobin, Arterial 0.0 0.0 - 5.0 %   Calcium, Ionized   Result Value Ref Range    POCT Calcium, Ionized 1.10 1.1 - 1.33  mmol/L   Magnesium   Result Value Ref Range    Magnesium 2.61 (H) 1.60 - 2.40 mg/dL   Coagulation Screen   Result Value Ref Range    Protime 12.7 9.8 - 12.8 seconds    INR 1.1 0.9 - 1.1    aPTT 28 27 - 38 seconds   Fibrinogen   Result Value Ref Range    Fibrinogen 233 200 - 400 mg/dL   CBC   Result Value Ref Range    WBC 26.3 (H) 4.4 - 11.3 x10*3/uL    nRBC 0.0 0.0 - 0.0 /100 WBCs    RBC 4.51 4.50 - 5.90 x10*6/uL    Hemoglobin 13.2 (L) 13.5 - 17.5 g/dL    Hematocrit 37.3 (L) 41.0 - 52.0 %    MCV 83 80 - 100 fL    MCH 29.3 26.0 - 34.0 pg    MCHC 35.4 32.0 - 36.0 g/dL    RDW 14.0 11.5 - 14.5 %    Platelets 202 150 - 450 x10*3/uL   Renal Function Panel   Result Value Ref Range    Glucose 133 (H) 74 - 99 mg/dL    Sodium 141 136 - 145 mmol/L    Potassium 3.3 (L) 3.5 - 5.3 mmol/L    Chloride 107 98 - 107 mmol/L    Bicarbonate 23 21 - 32 mmol/L    Anion Gap 14 10 - 20 mmol/L    Urea Nitrogen 24 (H) 6 - 23 mg/dL    Creatinine 1.85 (H) 0.50 - 1.30 mg/dL    eGFR 38 (L) >60 mL/min/1.73m*2    Calcium 8.2 (L) 8.6 - 10.6 mg/dL    Phosphorus 3.1 2.5 - 4.9 mg/dL    Albumin 3.5 3.4 - 5.0 g/dL   Troponin I, High Sensitivity   Result Value Ref Range    Troponin I, High Sensitivity 2,066 (HH) 0 - 53 ng/L   POCT GLUCOSE   Result Value Ref Range    POCT Glucose 195 (H) 74 - 99 mg/dL   Blood gas arterial   Result Value Ref Range    POCT pH, Arterial 7.43 (H) 7.38 - 7.42 pH    POCT pCO2, Arterial 31 (L) 38 - 42 mm Hg    POCT pO2, Arterial 132 (H) 85 - 95 mm Hg    POCT SO2, Arterial 99 94 - 100 %    POCT Oxy Hemoglobin, Arterial 97.5 94.0 - 98.0 %    POCT Base Excess, Arterial -2.7 (L) -2.0 - 3.0 mmol/L    POCT HCO3 Calculated, Arterial 20.6 (L) 22.0 - 26.0 mmol/L    Patient Temperature 37.0 degrees Celsius    FiO2 50 %    Ventilator Mode A/C PC     Site of Arterial Puncture Arterial Line    Blood Gas Arterial Full Panel   Result Value Ref Range    POCT pH, Arterial 7.43 (H) 7.38 - 7.42 pH    POCT pCO2, Arterial 31 (L) 38 - 42 mm Hg    POCT  pO2, Arterial 130 (H) 85 - 95 mm Hg    POCT SO2, Arterial 99 94 - 100 %    POCT Oxy Hemoglobin, Arterial 97.2 94.0 - 98.0 %    POCT Hematocrit Calculated, Arterial 37.0 (L) 41.0 - 52.0 %    POCT Sodium, Arterial 138 136 - 145 mmol/L    POCT Potassium, Arterial 5.0 3.5 - 5.3 mmol/L    POCT Chloride, Arterial 110 (H) 98 - 107 mmol/L    POCT Ionized Calcium, Arterial 1.16 1.10 - 1.33 mmol/L    POCT Glucose, Arterial 178 (H) 74 - 99 mg/dL    POCT Lactate, Arterial 2.3 (H) 0.4 - 2.0 mmol/L    POCT Base Excess, Arterial -2.9 (L) -2.0 - 3.0 mmol/L    POCT HCO3 Calculated, Arterial 20.6 (L) 22.0 - 26.0 mmol/L    POCT Hemoglobin, Arterial 12.2 (L) 13.5 - 17.5 g/dL    POCT Anion Gap, Arterial 12 10 - 25 mmo/L    Patient Temperature 37.0 degrees Celsius    FiO2 50 %    Ventilator Mode A/C PC     Site of Arterial Puncture Arterial Line    POCT GLUCOSE   Result Value Ref Range    POCT Glucose 190 (H) 74 - 99 mg/dL   ECG 12 Lead   Result Value Ref Range    Ventricular Rate 78 BPM    Atrial Rate 78 BPM    ND Interval 176 ms    QRS Duration 94 ms    QT Interval 388 ms    QTC Calculation(Bazett) 442 ms    P Axis 20 degrees    R Axis -39 degrees    T Axis -38 degrees    QRS Count 13 beats    Q Onset 211 ms    T Offset 405 ms    QTC Fredericia 423 ms   POCT GLUCOSE   Result Value Ref Range    POCT Glucose 171 (H) 74 - 99 mg/dL   Calcium, Ionized   Result Value Ref Range    POCT Calcium, Ionized 1.18 1.1 - 1.33 mmol/L   Magnesium   Result Value Ref Range    Magnesium 2.46 (H) 1.60 - 2.40 mg/dL   CBC and Auto Differential   Result Value Ref Range    WBC 8.8 4.4 - 11.3 x10*3/uL    nRBC 0.0 0.0 - 0.0 /100 WBCs    RBC 3.96 (L) 4.50 - 5.90 x10*6/uL    Hemoglobin 11.6 (L) 13.5 - 17.5 g/dL    Hematocrit 32.4 (L) 41.0 - 52.0 %    MCV 82 80 - 100 fL    MCH 29.3 26.0 - 34.0 pg    MCHC 35.8 32.0 - 36.0 g/dL    RDW 14.0 11.5 - 14.5 %    Platelets 112 (L) 150 - 450 x10*3/uL    Neutrophils % 90.0 40.0 - 80.0 %    Immature Granulocytes %, Automated  0.7 0.0 - 0.9 %    Lymphocytes % 3.9 13.0 - 44.0 %    Monocytes % 5.3 2.0 - 10.0 %    Eosinophils % 0.0 0.0 - 6.0 %    Basophils % 0.1 0.0 - 2.0 %    Neutrophils Absolute 7.91 (H) 1.60 - 5.50 x10*3/uL    Immature Granulocytes Absolute, Automated 0.06 0.00 - 0.50 x10*3/uL    Lymphocytes Absolute 0.34 (L) 0.80 - 3.00 x10*3/uL    Monocytes Absolute 0.47 0.05 - 0.80 x10*3/uL    Eosinophils Absolute 0.00 0.00 - 0.40 x10*3/uL    Basophils Absolute 0.01 0.00 - 0.10 x10*3/uL   Troponin I, High Sensitivity   Result Value Ref Range    Troponin I, High Sensitivity 2,782 (HH) 0 - 53 ng/L   Comprehensive metabolic panel   Result Value Ref Range    Glucose 152 (H) 74 - 99 mg/dL    Sodium 141 136 - 145 mmol/L    Potassium 4.3 3.5 - 5.3 mmol/L    Chloride 109 (H) 98 - 107 mmol/L    Bicarbonate 22 21 - 32 mmol/L    Anion Gap 14 10 - 20 mmol/L    Urea Nitrogen 28 (H) 6 - 23 mg/dL    Creatinine 1.76 (H) 0.50 - 1.30 mg/dL    eGFR 40 (L) >60 mL/min/1.73m*2    Calcium 8.6 8.6 - 10.6 mg/dL    Albumin 3.6 3.4 - 5.0 g/dL    Alkaline Phosphatase 42 33 - 136 U/L    Total Protein 5.4 (L) 6.4 - 8.2 g/dL    AST 21 9 - 39 U/L    Bilirubin, Total 0.4 0.0 - 1.2 mg/dL    ALT 13 10 - 52 U/L   Phosphorus   Result Value Ref Range    Phosphorus 3.2 2.5 - 4.9 mg/dL   Blood Gas Arterial Full Panel   Result Value Ref Range    POCT pH, Arterial 7.42 7.38 - 7.42 pH    POCT pCO2, Arterial 33 (L) 38 - 42 mm Hg    POCT pO2, Arterial 109 (H) 85 - 95 mm Hg    POCT SO2, Arterial 99 94 - 100 %    POCT Oxy Hemoglobin, Arterial 97.2 94.0 - 98.0 %    POCT Hematocrit Calculated, Arterial 36.0 (L) 41.0 - 52.0 %    POCT Sodium, Arterial 139 136 - 145 mmol/L    POCT Potassium, Arterial 4.6 3.5 - 5.3 mmol/L    POCT Chloride, Arterial 109 (H) 98 - 107 mmol/L    POCT Ionized Calcium, Arterial 1.18 1.10 - 1.33 mmol/L    POCT Glucose, Arterial 154 (H) 74 - 99 mg/dL    POCT Lactate, Arterial 1.6 0.4 - 2.0 mmol/L    POCT Base Excess, Arterial -2.4 (L) -2.0 - 3.0 mmol/L    POCT  HCO3 Calculated, Arterial 21.4 (L) 22.0 - 26.0 mmol/L    POCT Hemoglobin, Arterial 11.9 (L) 13.5 - 17.5 g/dL    POCT Anion Gap, Arterial 13 10 - 25 mmo/L    Patient Temperature 37.0 degrees Celsius    FiO2 40 %    Ventilator Mode CPAP     Site of Arterial Puncture Arterial Line    POCT GLUCOSE   Result Value Ref Range    POCT Glucose 154 (H) 74 - 99 mg/dL   POCT GLUCOSE   Result Value Ref Range    POCT Glucose 135 (H) 74 - 99 mg/dL        Umair Carney is a 74 y.o. year old male patient who presents for AVR and CABGx3 with Dr. Durant on 6/21/24. Acute Pain consulted for block for postoperative pain control.   Plan:    - Bilateral SAP blocks with catheters as well as bilateral subcostal TAP blocks performed postoperatively in the OR on 6/21/24  - Bolus 5cc 0.5% ropivacaine per side, refill ambit today  - Continue Ambit pump with Ropivacaine 0.2%/NaCl 0.9% 500mL, Rate 7 cc/hr bilaterally  - Ambit medication will not interfere with pain medication prescribed by the primary team.   - Please be aware of local anesthetic toxic dose and absorption variability before considering lidocaine patches  - Acute pain service will follow while catheters in place  - Rest of pain management per primary team    Acute Pain Resident  pg [phone] ph [phone]

## 2024-06-22 NOTE — PROGRESS NOTES
"CTICU Progress Note  Umair Carney/[id number]    Admit Date: 6/21/2024  Hospital Length of Stay: 1   ICU Length of Stay: 20h   CT SURGEON:     SUBJECTIVE:   Overnight, patient extubated without issues to 4LNC. Lactate cleared. This morning, patient is sitting bed, alert and not reporting chest pain, shortness of breath, abdominal pain.         MEDICATIONS  Infusions:  dexmedeTOMIDine, Last Rate: Stopped (06/22/24 0730)  lactated Ringer's, Last Rate: 30 mL/hr (06/22/24 0600)  lactated Ringer's, Last Rate: 5 mL/hr (06/22/24 0600)      Scheduled:  acetaminophen, 650 mg, q6h  aspirin, 81 mg, Daily  ceFAZolin, 2 g, q8h  insulin lispro, 0-15 Units, q4h  polyethylene glycol, 17 g, Daily  rosuvastatin, 20 mg, Nightly  sennosides-docusate sodium, 2 tablet, BID      PRN:  alteplase, 2 mg, PRN  calcium gluconate, 1 g, q6h PRN  calcium gluconate, 2 g, q6h PRN  dextrose, 25 g, q15 min PRN   Or  glucagon, 1 mg, q15 min PRN  dextrose, 25 g, q15 min PRN   Or  glucagon, 1 mg, q15 min PRN  magnesium sulfate, 2 g, q6h PRN  magnesium sulfate, 4 g, q6h PRN  naloxone, 0.2 mg, q5 min PRN  ondansetron, 4 mg, q8h PRN   Or  ondansetron, 4 mg, q8h PRN  oxyCODONE, 10 mg, q4h PRN  oxyCODONE, 5 mg, q4h PRN  oxygen, , Continuous PRN - O2/gases  potassium chloride CR, 20 mEq, q6h PRN   Or  potassium chloride, 20 mEq, q6h PRN  potassium chloride CR, 40 mEq, q6h PRN   Or  potassium chloride, 40 mEq, q6h PRN  promethazine, 25 mg, q6h PRN   Or  promethazine, 25 mg, q12h PRN        PHYSICAL EXAM:   Visit Vitals  /57 (Patient Position: Lying)   Pulse 80   Temp 37.3 °C (99.1 °F) (Temporal)   Resp 22   Ht 1.854 m (6' 1\")   Wt 95.1 kg (209 lb 10.5 oz)   SpO2 97%   BMI 27.66 kg/m²   Smoking Status Never   BSA 2.21 m²     Wt Readings from Last 5 Encounters:   06/21/24 95.1 kg (209 lb 10.5 oz)   06/05/24 93.8 kg (206 lb 11.2 oz)   04/10/24 91.2 kg (201 lb)     INTAKE/OUTPUT:  I/O last 3 completed shifts:  In: 6743.8 (70.9 mL/kg) [I.V.:1095.8 (11.5 " mL/kg); Blood:1200; IV Piggyback:4260]  Out: 2450 (25.8 mL/kg) [Urine:1730 (0.5 mL/kg/hr); Blood:250; Chest Tube:470]  Weight: 95.1 kg        LDA:  CVC 06/21/24 Triple lumen Left Internal jugular (Active)   Placement Date/Time: 06/21/24 (c) 2227   Hand Hygiene Performed Prior to CVC Insertion: Yes  Site Prep: Chlorhexidine   Site Prep Agent has Completely Dried Before Insertion: Yes  All 5 Sterile Barriers Used (Gloves, Gown, Cap, Mask, Large Sterile Beatrice...   Number of days: 0       Arterial Line 06/21/24 Left Brachial (Active)   Placement Date/Time: 06/21/24 (c) 0825   Size: 20 G  Orientation: Left  Location: Brachial  Securement Method: Transparent dressing  Patient Tolerance: Tolerated well   Number of days: 1       Pacer Wires (Active)   Placement Date: 06/21/24   Hand Hygiene Completed: Yes  Type of Pacing Wires: Atrial and Ventricular   Number of days: 1       ETT  7.5 mm (Active)   Placement Date/Time: 06/21/24 (c) 0807   Mask Ventilation: Vent by mask + OA or adjuvant +/- NMBA  Technique: Direct laryngoscopy  ETT Type: ETT - single  Single Lumen Tube Size: 7.5 mm  Cuffed: Yes  Laryngoscope: Nickie  Blade Size: 4  Location: O...   Number of days: 1       Urethral Catheter Non-latex;Temperature probe 16 Fr. (Active)   Placement Date/Time: 06/21/24 0800   Placed by: MARYLU BOYER  Hand Hygiene Completed: Yes  Catheter Type: Non-latex;Temperature probe  Tube Size (Fr.): 16 Fr.  Catheter Balloon Size: 10 mL  Urine Returned: Yes   Number of days: 1       Chest Tube Left Pleural (Active)   Placement Date: 06/21/24   Chest Tube Orientation: Left  Chest Tube Location: Pleural   Number of days: 1       Y Chest Tube 1 and 2 Mediastinal Mediastinal (Active)   Placement Date: 06/21/24   Chest Tube Location 1: Mediastinal  Chest Tube Location 2: Mediastinal   Number of days: 1         Vent settings:  Vent Mode: Pressure support  FiO2 (%):  [40 %-50 %] 40 %  S RR:  [16] 16  S VT:  [640 mL] 640 mL  PEEP/CPAP (cm H2O):  [5  cm H20-8 cm H20] 5 cm H20  SD SUP:  [5 cm H20] 5 cm H20  MAP (cm H2O):  [13] 13    Physical Exam:   - CONSTITUTION: Sitting in bed, no acute distress  - NEUROLOGIC: Alert, oriented, moving all extremities  - CARDIOVASCULAR: Regular rate and rhythm, no murmurs  - RESPIRATORY: Clear to auscultation bilaterally  - GI: Soft, nontender, nondistended  - : Valle in place with yellow urine in valle bag  - EXTREMITIES: no significant lower extremity edema  - SKIN: warm and dry  - PSYCHIATRIC: calm and conversant      Images:     Invasive Hemodynamics:    Most Recent Range Past 24hrs   BP (Art) 113/64 Arterial Line BP 1  Min: 93/59  Max: 127/76   MAP(Art) 81 mmHg Arterial Line MAP 1 (mmHg)   Min: 70 mmHg  Max: 94 mmHg   RA/CVP   No data recorded   PA   No data recorded   PA(mean)   No data recorded   CO   No data recorded   CI   No data recorded   Mixed Venous   No data recorded   SVR    No data recorded     If Devices present, use phrases:    For LVAD, .lhcticulvad   For ECMO, .lhcticuecmo  For Impella, .lhcticuimpella    Daily Risk Screen:  Line unnecessary, will be removed today  critically ill patient who need accurate urinary output measurements      Assessment/Plan   Umair Carney is a 74 year old male with PMHx of HTN, HLD, Prostate cancer s/p radiation in 2016, T2DM who was referred to Dr. Durant for nonrheumatic aortic valve stenosis now s/p AVR and CABGx3 (LIMA-LAD, SVG-CX, SVG-PDA), LAAC with Dr. Durant.     Plan:  NEURO: No sig PMHx. Patient sitting in chair. Acute post operative pain.   -->  - Serial neuro and pain assessments   - Scheduled Tylenol   - PRN oxycodone  - SAP block with catheters for pain - acute pain following  - PT Consult, OOB to chair as tolerated, chair position if not tolerated   - CAM ICU score qshift  - Sleep/wake cycle hygiene  - Hold home - no significant home meds to hold     CV:  Patient has a history of HTN, HLD, CAD Is now status post AVR, CABGx3 (LIMA-LAD, SVG-CX, SVG-PDA) Pre/Post EF:  Pre 40-45%. Intra-op IDANIA with initial off pump sluggish function (estimated EF 25-30%) with subsequent improvement on Epi to EF 45-50% Arrived to CTICU on Epi 0.08 A/V epicardial wires set AAI @ 50.-->  - Maintain goal MAP > 75  - Mixed venous and CI Q4H  - Volume resuscitate as clinically indicated  - Maintain epicardial wires set AAI @ 50  - stop trending trop (completed 2x per research study)  - CABG is not 2/2 to NSTEMI/unstable angina - no Plavix  - Start statin- continue Home Rosuvastatin 20mg  - Started Metop tartrate 12.5mg BID - holding parameter if MAP < 75 or HR < 60  - Obtain 12 lead EKG today  - Hold home Metoprolol succinate 50mg daily, Hydralazine 100mg TID, Fenofibrate 145mg, Amlodipine 10mg, Lisinopril -hydrochlorothiazide 20-25 mg daily     PULM:  No history of pulmonary disease.  Currently on RA with O2 sat 95%. CT output 90cc med with air leak and 10cc L pleural with no air leak  - CXR - bibasilar atelectasis noted  - IS q1h and OOB to chair   - Chest tubes to wall suction - will assess output with ambulation and consider removal in afternoon     GI: No sig PMHx. ->  - Diabetic diet ordered  - Colace/senna BID and miralax BID     :  CSA-SID Risk Score low (3).  No history of renal disease, baseline creatinine 1.5. Cr mildly uptrending today   - Cont to monitor Cr on RFP   - Continue valle catheter for strict I/Os.  - Goal UOP 0.5ml/kg/hr  - RFP as clinically indicated  - Replete electrolytes per CTICU protocol     ENDO:  PMH of T2DM A1c: 6.7-->  - Maintain BG <180, insulin per CTICU protocol  - Diabetic diet  - cardiac SSI ordered  - Home - glimepiride 2mg, Metformin 1000mg BID, Semaglutide 14mg, Jardiance 25mg     HEME:  Acute blood loss anemia and thrombocytopenia.-->    - Monitor drain output volume and characteristics  - CBC, coags, and fibrinogen post op and as clinically indicated  - Continue ASA 81mg  - Not STEMI/NSTEMI/unstable angina per history - no plan for Plavix  - SQH starting  today   - SCDs for DVT prophylaxis.  - Last type and screen: 6/21/24     ID:  Afebrile, no current indications of infection. MRSA neg on 6/5/24.-->  - Trend temp q4h  - Periop cefazolin x 48hrs     Skin:  No active skin issues.  - preventative Mepilex dressings in place on sacrum and heels  - change preventative Mepilex weekly or more frequently as indicated (when moist/soiled)   - every shift skin assessment per nursing and weekly ICU skin rounds  - moisture barrier to be applied with claudia care  - active skin problems addressed with nursing on daily rounds     Proph:  SCDs  PPI     G:  Line  Right IJ MAC w Minimac placed 6/21/24 - assess for removal after ambulation/chair  Left brachial a-line placed 6/21/24    Patient seen and discussed with Dr. Mathias.    Kory Boogie, PGY-1  CTICU Team

## 2024-06-22 NOTE — PROGRESS NOTES
Physical Therapy    Physical Therapy Evaluation    Patient Name: Umair Carney  MRN: [id number]  Today's Date: 6/22/2024   Time Calculation  Start Time: 1150  Stop Time: 1214  Time Calculation (min): 24 min    Assessment/Plan   PT Assessment  PT Assessment Results: Decreased endurance, Decreased strength, Impaired balance, Decreased mobility  Rehab Prognosis: Good  Medical Staff Made Aware: Yes  End of Session Communication: Bedside nurse  Assessment Comment: Pt demonstrated ability to complete bed mobility, sit<>stand transfer and bed>chair transfer with B arm in arm.  Min-CGA provided with functional mobility.  No increase in pain with mobiltiy. No instability.  Vitals stable.  End of Session Patient Position: Up in chair, Alarm off, not on at start of session  IP OR SWING BED PT PLAN  Inpatient or Swing Bed: Inpatient  PT Plan  Treatment/Interventions: Bed mobility, Transfer training, Gait training, Stair training, Balance training, Strengthening, Endurance training, Therapeutic exercise, Therapeutic activity  PT Plan: Ongoing PT  PT Frequency: 3 times per week  PT Discharge Recommendations: Low intensity level of continued care  PT Recommended Transfer Status: Assist x1, Assistive device  PT - OK to Discharge: Yes      Subjective   General Visit Information:  General  Reason for Referral: AVR and CABGx3 (LIMA-LAD, SVG-CX, SVG-PDA), LAAC  Referred By: Dr. Durant.  Past Medical History Relevant to Rehab: HTN, HLD, Prostate cancer s/p radiation in 2016, T2DM  Missed Visit: No  Family/Caregiver Present: Yes  Caregiver Feedback: Wife present throughout session  Prior to Session Communication: Bedside nurse  Patient Position Received: Bed, 3 rail up, Alarm off, not on at start of session  Preferred Learning Style: auditory, verbal  General Comment: Pt awake, alert and willing to participate in PT session  Home Living:  Home Living  Type of Home: House  Lives With: Spouse  Home Adaptive Equipment: Walker rolling or  standard  Home Layout:  (3 EMEKA, bed/bath - 1st floor, walk in shower with GB and SC)  Prior Level of Function:  Prior Function Per Pt/Caregiver Report  Level of Ruby: Independent with ADLs and functional transfers, Independent with homemaking with ambulation  ADL Assistance: Independent  Homemaking Assistance: Independent  Ambulatory Assistance: Independent  Vocational: Full time employment ([employer])  Prior Function Comments: (+) drives, x1 fall down the stairs, hurt his R shoulder/arm  Precautions:  Precautions  Hearing/Visual Limitations: WFL  Medical Precautions: Cardiac precautions, Fall precautions, Chest tube  Post-Surgical Precautions: Move in the Tube  Precautions Comment: MAP >75, AAI @ 50  Vital Signs:  Vital Signs  Heart Rate: 91 (Post: 80)  Heart Rate Source: Monitor  Resp: 24 (Post; 22)  SpO2: 96 % (Post: 98)  BP: 133/75 (Post; 128/73)  MAP (mmHg): 96 (Post: 92)  BP Location: Left arm  BP Method: Arterial line  Patient Position:  (Supine start, sitting end of session)    Objective   Pain:  Pain Assessment  Pain Assessment: 0-10  0-10 (Numeric) Pain Score: 3 (Start/end of session)  Pain Type: Surgical pain  Pain Location: Chest  Cognition:  Cognition  Overall Cognitive Status: Within Functional Limits  Orientation Level: Oriented X4    General Assessments:  General Observation  General Observation: tele, fariba, CVP, pain catheter, CVC, valle, chest tube x2 (Increased time for skilled ICU line/tube management for safe functional mobility;  1x10 reps of incentive spirometer: </=2000)     Activity Tolerance  Endurance: Tolerates 10 - 20 min exercise with multiple rests  Early Mobility/Exercise Safety Screen: Proceed with mobilization - No exclusion criteria met    Sensation  Light Touch: No apparent deficits (Post-op)    Strength  Strength Comments: Not formally assessed however at least 3/5 shown through functional mobility  Postural Control  Postural Control: Within Functional Limits    Static  Sitting Balance  Static Sitting-Balance Support: Bilateral upper extremity supported, Feet supported  Static Sitting-Level of Assistance: Close supervision    Static Standing Balance  Static Standing-Balance Support: Bilateral upper extremity supported  Static Standing-Level of Assistance: Contact guard  Static Standing-Comment/Number of Minutes: B arm in arm  Functional Assessments:  ADL  ADL's Addressed: No    Bed Mobility  Bed Mobility: Yes  Bed Mobility 1  Bed Mobility 1: Supine to sitting  Level of Assistance 1: Contact guard  Bed Mobility Comments 1: HOB elevated, cues for sequencing/hand placement    Transfers  Transfer: Yes  Transfer 1  Transfer From 1: Sit to, Stand to  Transfer to 1: Sit, Stand  Technique 1: Sit to stand, Stand to sit  Transfer Device 1:  (B arm in arm)  Transfer Level of Assistance 1: Minimum assistance  Trials/Comments 1: Cues for hand placement  Transfers 2  Transfer From 2: Bed to  Transfer to 2: Chair with arms  Technique 2: Lateral, To left  Transfer Device 2:  (B arm in arm)  Transfer Level of Assistance 2: Minimum assistance  Trials/Comments 2: ~4-5 lateral steps to complete transfer; wide RYLAND, minimal foot clearance    Ambulation/Gait Training  Ambulation/Gait Training Performed:  (Refer to transfer section for additional information)    Stairs  Stairs: No  Extremity/Trunk Assessments:  RUE   RUE : Within Functional Limits  LUE   LUE: Within Functional Limits  RLE   RLE : Within Functional Limits  LLE   LLE : Within Functional Limits  Outcome Measures:  [facility] Basic Mobility  Turning from your back to your side while in a flat bed without using bedrails: A little  Moving from lying on your back to sitting on the side of a flat bed without using bedrails: A little  Moving to and from bed to chair (including a wheelchair): A little  Standing up from a chair using your arms (e.g. wheelchair or bedside chair): A little  To walk in hospital room: A little  Climbing 3-5 steps with  railing: A little  Basic Mobility - Total Score: 18    FSS-ICU  Ambulation: Walks <50 feet with any assistance x1 or walks any distance with assistance x2 people  Rolling: Supervision or set-up only  Sitting: Supervision or set-up only  Transfer Sit-to-Stand: Supervision or set-up only  Transfer Supine-to-Sit: Supervision or set-up only  Total Score: 21    ICU Mobility Screen  Early Mobility/Exercise Safety Screen: Proceed with mobilization - No exclusion criteria met  ICU Mobility Scale: Transferring bed to chair    Encounter Problems       Encounter Problems (Active)       Balance       Pt will demonstrated ability to score at least 24/28 on the Tinetti balance assessment tool to ensure safety upon D/C.  (Progressing)       Start:  06/22/24    Expected End:  07/06/24               Mobility       Pt will demonstrated ability to ambulate >/=300ft with proper form and no balance deficits for safe home going.   (Progressing)       Start:  06/22/24    Expected End:  07/06/24            Pt will demonstrate ability to ascend/descend 3 stairs with no rail and no balance deficits for safe home going.  (Progressing)       Start:  06/22/24    Expected End:  07/06/24               PT Transfers       Pt will demonstrate ability to complete 5X STS in < 30 sec with good from and consistency between transfers for safe D/C.  (Progressing)       Start:  06/22/24    Expected End:  07/06/24                   Education Documentation  Handouts, taught by Mariana Angelo, PT at 6/22/2024  2:10 PM.  Learner: Patient  Readiness: Acceptance  Method: Explanation, Handout, Demonstration  Response: Demonstrated Understanding, Verbalizes Understanding  Comment: UZIELT, use of IS    Precautions, taught by Mariana Angelo, PT at 6/22/2024  2:10 PM.  Learner: Patient  Readiness: Acceptance  Method: Explanation, Handout, Demonstration  Response: Demonstrated Understanding, Verbalizes Understanding  Comment: MITT, use of IS    Body Mechanics, taught by  Mariana Angelo, PT at 6/22/2024  2:10 PM.  Learner: Patient  Readiness: Acceptance  Method: Explanation, Handout, Demonstration  Response: Demonstrated Understanding, Verbalizes Understanding  Comment: MITT, use of IS    Mobility Training, taught by Mariana Angelo, PT at 6/22/2024  2:10 PM.  Learner: Patient  Readiness: Acceptance  Method: Explanation, Handout, Demonstration  Response: Demonstrated Understanding, Verbalizes Understanding  Comment: MITT, use of IS    Education Comments  No comments found.

## 2024-06-22 NOTE — CARE PLAN
The patient's goals for the shift include      The clinical goals for the shift include      Over the shift, the patient did not make progress toward the following goals. Recommendations to address these barriers include   Problem: Skin  Goal: Decreased wound size/increased tissue granulation at next dressing change  Outcome: Progressing  Goal: Participates in plan/prevention/treatment measures  Outcome: Progressing  Goal: Prevent/manage excess moisture  Outcome: Progressing  Goal: Prevent/minimize sheer/friction injuries  Outcome: Progressing  Goal: Promote/optimize nutrition  Outcome: Progressing  Goal: Promote skin healing  Outcome: Progressing     Problem: Fall/Injury  Goal: Not fall by end of shift  Outcome: Progressing  Goal: Be free from injury by end of the shift  Outcome: Progressing  Goal: Verbalize understanding of personal risk factors for fall in the hospital  Outcome: Progressing  Goal: Verbalize understanding of risk factor reduction measures to prevent injury from fall in the home  Outcome: Progressing  Goal: Use assistive devices by end of the shift  Outcome: Progressing  Goal: Pace activities to prevent fatigue by end of the shift  Outcome: Progressing

## 2024-06-22 NOTE — SIGNIFICANT EVENT
06/21/24 2246   Daily Screen   Total RSBI (S)  25.19   Weaning Parameters   Weaning Vital Capacity (S)  991 mL   Negative Inspiratory Force (NIF) (S)  -32   Spontaneous Minute Volume (MV) (S)  6.71   Weaning Tidal Volume (S)  516 mL   Respiratory Depth/Rhythm (S)    (rr 13)   Respiratory Effort (S)  Unlabored

## 2024-06-22 NOTE — CARE PLAN
The patient's goals for the shift include      The clinical goals for the shift include  to be hemodynamically stable and to have no falls/injuries throughout shift    Over the shift, the patient did not make progress toward the following goals. Barriers to progression include . Recommendations to address these barriers include .

## 2024-06-22 NOTE — HOSPITAL COURSE
"Umair Carney is a 74 year old male with PMHx of HTN, HLD, Prostate cancer s/p radiation in 2016, T2DM who presented to [facility] on 6/21/2024 for planned cardiac surgery.     Patient was referred to Dr. Durant for nonrheumatic aortic valve stenosis.      OPERATION/PROCEDURE: 6/21/2024 with Usman Durant   1. Median Sternotomy   2. Replacement Aortic Valve with XL Perceval Plus  3. ALEX clip with 35mm Atricure  4. CABG x3 LIMA-LAD, SVG-CX, SVG-PDA    CTICU Course: uneventful   Transfer to LT3:   ===================    Floor Course:  - Patient was diuresed for fluid volume overload post cardiac surgery; Preop weight: 95.1kg, discharge wt: 90.2kg  /84   Pulse 74   Temp 36.3 °C (97.3 °F)   Resp 19   Ht 1.854 m (6' 1\")   Wt 97.8 kg (215 lb 9.6 oz)   SpO2 98%   BMI 28.44 kg/m²    --- Discharging patient on short course of PO Lasix   - On ASA, statin, BB, Lisinopril  by discharge    - Epicardial wires CUT on 6/27  - Telemetry at discharge SR 60's to 70's     Hx of Hypertension: Discharging patient on   -- Metoprolol tartrate BID   -- Lisinopril 20mg daily   -- Amlodipine 10mg daily     Diabetes Mellitus Type 2:   - endocrine following appreciate recs  Discharge Recommendations on 6/24/24 - Home Diabetes regimen:     Metformin 1000mg orally BID  Rybelsius - Outpatient follow up consideration for Ozempic in the light of cardioprotection as well and predictability  -- unable to discharge patient on Jardiance due to valle replaced with acute urinary retention    - 6/26: Stopped Jardiance due to valle being replaced and likely being discharged with valle   SGLT2i tx may not be utilized in inpt setting unless the following conditions are met. Only HF Cardiology may initiate inpatient SGLT2i use.   1) pt is eating and drinking by mouth with a total daily carb intake exceeding 60g of CHO  2) pt is urinating independently of urinary catheters  3) pt can ambulate freely to the restroom in order to " maintain personal hygiene  4) no current concern for UTI/genital or inguinal candidiasis  5) no plans for NPO within the next 48-72hr     Hx of prostate cancer and chemotherapy w/ hematuria   BPH:   - hx of urinary retention and hx of having to have catheter replaced s/p previous knee surgery   - continue tamsulosin: 0.8mg daily   - 6/26: Acute urinary retention; 900mL on bladder scan; straight cath x1   ---Urinary Retention in afternoon after TOV; 700mL on bladder scan   --- 18F [place] Valle Catheter placed  --- Valle catheter clean and dry; light caroline colored urine in valle bag; patient discharged with Leg valle bag   - Will Discharge patient with valle for acute urinary retention    - Follow up with Voiding trial as an outpatient with urology with Dr. Yasmany Sosa     Chronic Kidney Disease Stage III: baseline SCr 1.5   - Creatinine at discharge: 1.42  - Discharging patient home with short course of PO Lasix       - 2v CXR done 6/26  - Postop echo done 6/24 and showed LVEF of 45%  - Cardiac rehab referral was placed  - PT recs home and low intensity therapy  - Anticipate discharge to home with homecare    On day of discharge, vital signs were stable and no acute distress was noted. All questions were answered. After VS and labs were reviewed it was determined the patient was stable for discharge.     Discharged to home with home care on 6/27  ========================    Hospital day of discharge management- spent >30 minutes coordinating the discharge and counseling/educating patient and family regarding discharge instructions.     Past Medical History:  - Coronary artery disease    - Hyperlipidemia    - Hypertension    - Prostate cancer (Multi)      s/p radiation  - Skin cancer      removed  - Type 2 diabetes mellitus (Multi)     Past Surgical History:  - CARDIAC CATHETERIZATION      - CATARACT EXTRACTION; Bilateral    - COLONOSCOPY      - KNEE ARTHROPLASTY; Bilateral    Medications Prior to Admission  -  amLODIPine (Norvasc) 10 mg tablet; Take 1 tablet (10 mg) by mouth once daily.      - aspirin 81 mg EC tablet; Take 1 tablet (81 mg) by mouth once daily.     - cholecalciferol (Vitamin D-3) 50 mcg (2,000 unit) capsule; Take 1 capsule (50 mcg) by mouth early in the morning..      - cyanocobalamin (Vitamin B-12) 1,000 mcg tablet; Take 1 tablet (1,000 mcg) by mouth once daily.     - fenofibrate (Tricor) 145 mg tablet; Take 1 tablet (145 mg) by mouth once daily.    - glimepiride (Amaryl) 2 mg tablet; TAKE 1 TABLET BY MOUTH BEFORE BREAKFAST AND 1 TABLET BEFORE DINNER       - hydrALAZINE (Apresoline) 100 mg tablet; Take 1 tablet (100 mg) by mouth 3 times a day.    - icosapent ethyL (Vascepa) 1 gram capsule; TAKE TWO CAPSULES BY MOUTH TWO TIMES A DAY AFTER MEALS       - Jardiance 25 mg; Take 1 tablet (25 mg) by mouth once daily.       - lisinopriL-hydrochlorothiazide 20-25 mg tablet; Take 1 tablet by mouth once daily at bedtime.    - metFORMIN (Glucophage) 1,000 mg tablet; TAKE ONE TABLET BY MOUTH AFTER LUNCH AND ONE TABLET AFTER DINNER      - metoprolol succinate XL (Toprol-XL) 50 mg 24 hr tablet; Take 1 tablet (50 mg) by mouth once daily at bedtime.       - potassium chloride CR 20 mEq ER tablet; Take 1 tablet (20 mEq) by mouth 2 times a day.       - rosuvastatin (Crestor) 20 mg tablet; Take 1 tablet (20 mg) by mouth once daily.       - Rybelsus 14 mg tablet tablet; TAKE 1 TABLET BY MOUTH ONCE DAILY ON AN EMPTY STOMACH.     Patient has no known allergies.    Social History  - Smoking status: Never  - Smokeless tobacco: Never  - Vaping status: Never Used  - Alcohol use: Not Currently  - Drug use: Never     Family History:   - Mother: heart Disease   - Father: Prostate Cancer     Postop Imaging:   XR Chest 2 Views: 6/26/2024     FINDINGS:  PA and lateral radiographs of the chest were provided.  Additional PA  dual energy images were also provided.      Postsurgical changes of median sternotomy. Left atrial  appendage  closure device. Cardiac valve replacement/repair.  CARDIOMEDIASTINAL SILHOUETTE:  Cardiomediastinal silhouette is stable in size and configuration.  LUNGS:  Left mid lung linear density consistent with an area of discoid  atelectasis.. Slight blunting of the right costophrenic angle. No  evidence of pneumothorax.  ABDOMEN:  No remarkable upper abdominal findings.   BONES:  No acute osseous changes.      IMPRESSION:  1.  Status post median sternotomy with medical devices as detailed  above.  2. Trace right pleural effusion with adjacent atelectasis. No  evidence of pneumothorax.         Signed by: Razia Craig 6/26/2024 10:17 AM  Dictation workstation:   [id number]     Transthoracic Echo (TTE) Complete with Contrast 6/24/2024     PHYSICIAN INTERPRETATION:  Left Ventricle: The left ventricular systolic function is mildly decreased, with a visually estimated ejection fraction of 45%. There is global hypokinesis of the left ventricle with minor regional variations. The left ventricular cavity size is normal. There is mild concentric left ventricular hypertrophy. Abnormal (paradoxical) septal motion consistent with post-operative status. Spectral Doppler shows a pseudonormal pattern of left ventricular diastolic filling.  Left Atrium: The left atrium is mildly dilated.  Right Ventricle: The right ventricle is mildly enlarged. There is mildly reduced right ventricular systolic function.  Right Atrium: The right atrium is mildly dilated.  Aortic Valve: There is a prosthetic aortic valve present. The aortic valve dimensionless index is 0.49. There is no evidence of aortic valve regurgitation. The peak instantaneous gradient of the aortic valve is 16.6 mmHg. The mean gradient of the aortic valve is 9.0 mmHg.  Mitral Valve: The mitral valve is normal in structure. There is trace mitral valve regurgitation.  Tricuspid Valve: The tricuspid valve was not well visualized. There is trace tricuspid  regurgitation.  Pulmonic Valve: The pulmonic valve is not well visualized. There is mild pulmonic valve regurgitation.  Pericardium: There is no pericardial effusion noted.  Aorta: The aortic root was not well visualized.     CONCLUSIONS:   1. The left ventricular systolic function is mildly decreased, with a visually estimated ejection fraction of 45%.   2. There is global hypokinesis of the left ventricle with minor regional variations.   3. Spectral Doppler shows a pseudonormal pattern of left ventricular diastolic filling.   4. Mildly enlarged right ventricle.   5. There is mildly reduced right ventricular systolic function.   6. Hemodynamics are consistent with normal functioning of the bioprosthetic aortic valve.   7. The left atrium is mildly dilated.   8. Abnormal septal motion consistent with post-operative status.     [id number] Mj Cody MD  Electronically signed on 6/24/2024 at 4:16:54 PM     ** Final **

## 2024-06-22 NOTE — CARE PLAN
The patient's goals for the shift include      The clinical goals for the shift include pt will remain hemodynamically stable with no falls/injuries throughout shift.    Over the shift, the patient did not make progress toward the following goals. Barriers to progression include . Recommendations to address these barriers include .

## 2024-06-22 NOTE — PROCEDURES
Central Line    Date/Time: 6/21/2024 10:27 PM    Performed by: Kory Boogie MD  Authorized by: Usman Durant MD    Consent:     Consent obtained:  Written    Consent given by:  Spouse    Risks, benefits, and alternatives were discussed: yes      Risks discussed:  Arterial puncture, incorrect placement, bleeding, infection and pneumothorax  Universal protocol:     Procedure explained and questions answered to patient or proxy's satisfaction: yes      Relevant documents present and verified: yes      Required blood products, implants, devices, and special equipment available: yes      Immediately prior to procedure, a time out was called: yes      Patient identity confirmed:  Hospital-assigned identification number and provided demographic data  Pre-procedure details:     Indication(s): central venous access      Hand hygiene: Hand hygiene performed prior to insertion      Sterile barrier technique: All elements of maximal sterile technique followed      Skin preparation:  Chlorhexidine    Skin preparation agent: Skin preparation agent completely dried prior to procedure    Sedation:     Sedation type: patient already on propofol gtt.  Anesthesia:     Anesthesia method:  Local infiltration    Local anesthetic:  Lidocaine 1% w/o epi  Procedure details:     Location:  L internal jugular    Site selection rationale:  R IJ line was in R subclavian vein so opted to place L IJ CVC    Patient position:  Trendelenburg    Procedural supplies:  Triple lumen    Catheter size:  9.5 Fr    Landmarks identified: yes      Ultrasound guidance: yes      Ultrasound guidance timing: prior to insertion and real time      Sterile ultrasound techniques: Sterile gel and sterile probe covers were used      Number of attempts:  1    Successful placement: yes    Post-procedure details:     Post-procedure:  Dressing applied and line sutured    Assessment:  Blood return through all ports and free fluid flow    Procedure completion:   Tolerated    Complications:  Line noted to be deep, curling into R IJ. Line was retracted 3cm and secured again. CXR pending for final verification of location.  Comments:      ICU Procedure Notes:    Internal Jugular Central Line Procedure Note  CONSENT:   Consent was obtained from spouse at bedside prior to the procedure. Indications, risks, and benefits were explained at length.   PROCEDURE SUMMARY:   A time out was performed. My hands were washed immediately prior to the procedure. I wore a surgical cap, mask, protective eyewear, full gown and sterile gloves throughout the procedure. The patient was placed in Trendelenburg position. LEFT chest and neck region was prepped using chlorhexidine scrub and draped in sterile fashion using a full body sterile drape. The medial and lateral heads of the sternocleidomastoid muscle were identified as was the carotid pulse. The ultrasound probe was prepped and covered in the standard sterile fashion. The Internal Jugular vein was identified using ultrasound. Under ultrasound guidance, anesthesia was achieved over the vein using 1% lidocaine. Using real-time out of plane ultrasound guidance, the introducer needle was inserted into the Internal Jugular vein under direct ultrasound visualization. Venous blood was withdrawn. The syringe was removed and a guidewire was advanced into the introducer needle. The guidewire was visualized in the Internal Jugular Vein by ultrasound. A small incision was made at the skin surface with a scalpel and the introducer needle was exchanged for a dilator over the guidewire. I ensured the guidewire moved freely with once the dilator was introduced into the vein. After appropriate dilation was obtained, the dilator was exchanged over the wire for a triple lumen central venous catheter. The wire was removed and all ports were aspirated and flushed properly. Injection caps were applied to each port. The catheter was sutured in place. A sterile  transparent dressing was placed over the catheter at the insertion site. The patient tolerated the procedure without any hemodynamic compromise. Line was noted to be deep and curling into R int jugular, so line was retracted by 3cm and re-secured. CXR pending. Post-procedure chest x-ray is pending at this time. Estimated blood loss is 2ml.

## 2024-06-23 ENCOUNTER — APPOINTMENT (OUTPATIENT)
Dept: RADIOLOGY | Facility: HOSPITAL | Age: 74
DRG: 220 | End: 2024-06-23
Payer: MEDICARE

## 2024-06-23 VITALS
OXYGEN SATURATION: 94 % | RESPIRATION RATE: 18 BRPM | HEART RATE: 77 BPM | SYSTOLIC BLOOD PRESSURE: 167 MMHG | TEMPERATURE: 99.5 F | HEIGHT: 73 IN | DIASTOLIC BLOOD PRESSURE: 96 MMHG | BODY MASS INDEX: 28.76 KG/M2 | WEIGHT: 217 LBS

## 2024-06-23 LAB
ALBUMIN SERPL BCP-MCNC: 3.4 G/DL (ref 3.4–5)
ALBUMIN SERPL BCP-MCNC: 3.4 G/DL (ref 3.4–5)
ALP SERPL-CCNC: 44 U/L (ref 33–136)
ALT SERPL W P-5'-P-CCNC: 10 U/L (ref 10–52)
ANION GAP SERPL CALC-SCNC: 15 MMOL/L (ref 10–20)
AST SERPL W P-5'-P-CCNC: 19 U/L (ref 9–39)
BASOPHILS # BLD AUTO: 0.02 X10*3/UL (ref 0–0.1)
BASOPHILS NFR BLD AUTO: 0.2 %
BILIRUB DIRECT SERPL-MCNC: 0.1 MG/DL (ref 0–0.3)
BILIRUB SERPL-MCNC: 0.3 MG/DL (ref 0–1.2)
BUN SERPL-MCNC: 40 MG/DL (ref 6–23)
CALCIUM SERPL-MCNC: 8.2 MG/DL (ref 8.6–10.6)
CHLORIDE SERPL-SCNC: 102 MMOL/L (ref 98–107)
CO2 SERPL-SCNC: 22 MMOL/L (ref 21–32)
CREAT SERPL-MCNC: 2.08 MG/DL (ref 0.5–1.3)
EGFRCR SERPLBLD CKD-EPI 2021: 33 ML/MIN/1.73M*2
EOSINOPHIL # BLD AUTO: 0.01 X10*3/UL (ref 0–0.4)
EOSINOPHIL NFR BLD AUTO: 0.1 %
ERYTHROCYTE [DISTWIDTH] IN BLOOD BY AUTOMATED COUNT: 14.5 % (ref 11.5–14.5)
ERYTHROCYTE [DISTWIDTH] IN BLOOD BY AUTOMATED COUNT: 14.5 % (ref 11.5–14.5)
GLUCOSE BLD MANUAL STRIP-MCNC: 168 MG/DL (ref 74–99)
GLUCOSE BLD MANUAL STRIP-MCNC: 183 MG/DL (ref 74–99)
GLUCOSE BLD MANUAL STRIP-MCNC: 196 MG/DL (ref 74–99)
GLUCOSE BLD MANUAL STRIP-MCNC: 212 MG/DL (ref 74–99)
GLUCOSE SERPL-MCNC: 275 MG/DL (ref 74–99)
HCT VFR BLD AUTO: 32.2 % (ref 41–52)
HCT VFR BLD AUTO: 32.2 % (ref 41–52)
HGB BLD-MCNC: 10.7 G/DL (ref 13.5–17.5)
HGB BLD-MCNC: 10.7 G/DL (ref 13.5–17.5)
IMM GRANULOCYTES # BLD AUTO: 0.08 X10*3/UL (ref 0–0.5)
IMM GRANULOCYTES NFR BLD AUTO: 0.6 % (ref 0–0.9)
INR PPP: 1.1 (ref 0.9–1.1)
LYMPHOCYTES # BLD AUTO: 0.79 X10*3/UL (ref 0.8–3)
LYMPHOCYTES NFR BLD AUTO: 6 %
MAGNESIUM SERPL-MCNC: 2.33 MG/DL (ref 1.6–2.4)
MCH RBC QN AUTO: 29.4 PG (ref 26–34)
MCH RBC QN AUTO: 29.4 PG (ref 26–34)
MCHC RBC AUTO-ENTMCNC: 33.2 G/DL (ref 32–36)
MCHC RBC AUTO-ENTMCNC: 33.2 G/DL (ref 32–36)
MCV RBC AUTO: 89 FL (ref 80–100)
MCV RBC AUTO: 89 FL (ref 80–100)
MONOCYTES # BLD AUTO: 1.07 X10*3/UL (ref 0.05–0.8)
MONOCYTES NFR BLD AUTO: 8.2 %
NEUTROPHILS # BLD AUTO: 11.1 X10*3/UL (ref 1.6–5.5)
NEUTROPHILS NFR BLD AUTO: 84.9 %
NRBC BLD-RTO: 0 /100 WBCS (ref 0–0)
NRBC BLD-RTO: 0 /100 WBCS (ref 0–0)
PHOSPHATE SERPL-MCNC: 3.4 MG/DL (ref 2.5–4.9)
PLATELET # BLD AUTO: 86 X10*3/UL (ref 150–450)
PLATELET # BLD AUTO: 86 X10*3/UL (ref 150–450)
POTASSIUM SERPL-SCNC: 4.1 MMOL/L (ref 3.5–5.3)
PROT SERPL-MCNC: 5.2 G/DL (ref 6.4–8.2)
PROTHROMBIN TIME: 12.9 SECONDS (ref 9.8–12.8)
RBC # BLD AUTO: 3.64 X10*6/UL (ref 4.5–5.9)
RBC # BLD AUTO: 3.64 X10*6/UL (ref 4.5–5.9)
SODIUM SERPL-SCNC: 135 MMOL/L (ref 136–145)
STAPHYLOCOCCUS SPEC CULT: NORMAL
WBC # BLD AUTO: 13.2 X10*3/UL (ref 4.4–11.3)
WBC # BLD AUTO: 13.2 X10*3/UL (ref 4.4–11.3)

## 2024-06-23 PROCEDURE — 71045 X-RAY EXAM CHEST 1 VIEW: CPT

## 2024-06-23 PROCEDURE — 85027 COMPLETE CBC AUTOMATED: CPT

## 2024-06-23 PROCEDURE — 83735 ASSAY OF MAGNESIUM: CPT

## 2024-06-23 PROCEDURE — 2500000001 HC RX 250 WO HCPCS SELF ADMINISTERED DRUGS (ALT 637 FOR MEDICARE OP)

## 2024-06-23 PROCEDURE — 80069 RENAL FUNCTION PANEL: CPT

## 2024-06-23 PROCEDURE — 82947 ASSAY GLUCOSE BLOOD QUANT: CPT

## 2024-06-23 PROCEDURE — 2500000004 HC RX 250 GENERAL PHARMACY W/ HCPCS (ALT 636 FOR OP/ED)

## 2024-06-23 PROCEDURE — 85610 PROTHROMBIN TIME: CPT

## 2024-06-23 PROCEDURE — 99231 SBSQ HOSP IP/OBS SF/LOW 25: CPT

## 2024-06-23 PROCEDURE — 2500000002 HC RX 250 W HCPCS SELF ADMINISTERED DRUGS (ALT 637 FOR MEDICARE OP, ALT 636 FOR OP/ED): Performed by: NURSE PRACTITIONER

## 2024-06-23 PROCEDURE — 2500000002 HC RX 250 W HCPCS SELF ADMINISTERED DRUGS (ALT 637 FOR MEDICARE OP, ALT 636 FOR OP/ED)

## 2024-06-23 PROCEDURE — 2500000004 HC RX 250 GENERAL PHARMACY W/ HCPCS (ALT 636 FOR OP/ED): Mod: JZ

## 2024-06-23 PROCEDURE — 2500000001 HC RX 250 WO HCPCS SELF ADMINISTERED DRUGS (ALT 637 FOR MEDICARE OP): Performed by: NURSE PRACTITIONER

## 2024-06-23 PROCEDURE — 85025 COMPLETE CBC W/AUTO DIFF WBC: CPT

## 2024-06-23 PROCEDURE — 71045 X-RAY EXAM CHEST 1 VIEW: CPT | Performed by: STUDENT IN AN ORGANIZED HEALTH CARE EDUCATION/TRAINING PROGRAM

## 2024-06-23 PROCEDURE — 99222 1ST HOSP IP/OBS MODERATE 55: CPT | Performed by: INTERNAL MEDICINE

## 2024-06-23 PROCEDURE — 1200000002 HC GENERAL ROOM WITH TELEMETRY DAILY

## 2024-06-23 RX ORDER — INSULIN LISPRO 100 [IU]/ML
4 INJECTION, SOLUTION INTRAVENOUS; SUBCUTANEOUS
Status: DISCONTINUED | OUTPATIENT
Start: 2024-06-23 | End: 2024-06-27 | Stop reason: HOSPADM

## 2024-06-23 RX ORDER — BISACODYL 10 MG/1
10 SUPPOSITORY RECTAL ONCE
Status: COMPLETED | OUTPATIENT
Start: 2024-06-23 | End: 2024-06-23

## 2024-06-23 RX ORDER — INSULIN GLARGINE 100 [IU]/ML
8 INJECTION, SOLUTION SUBCUTANEOUS EVERY 24 HOURS
Status: DISCONTINUED | OUTPATIENT
Start: 2024-06-23 | End: 2024-06-25

## 2024-06-23 ASSESSMENT — COGNITIVE AND FUNCTIONAL STATUS - GENERAL
HELP NEEDED FOR BATHING: A LITTLE
TOILETING: A LITTLE
DAILY ACTIVITIY SCORE: 19
CLIMB 3 TO 5 STEPS WITH RAILING: A LOT
DRESSING REGULAR LOWER BODY CLOTHING: A LITTLE
STANDING UP FROM CHAIR USING ARMS: A LITTLE
TURNING FROM BACK TO SIDE WHILE IN FLAT BAD: A LITTLE
MOVING TO AND FROM BED TO CHAIR: A LITTLE
MOVING TO AND FROM BED TO CHAIR: A LITTLE
DAILY ACTIVITIY SCORE: 19
STANDING UP FROM CHAIR USING ARMS: A LITTLE
HELP NEEDED FOR BATHING: A LITTLE
TURNING FROM BACK TO SIDE WHILE IN FLAT BAD: A LITTLE
DRESSING REGULAR LOWER BODY CLOTHING: A LITTLE
DRESSING REGULAR UPPER BODY CLOTHING: A LITTLE
WALKING IN HOSPITAL ROOM: A LITTLE
DRESSING REGULAR UPPER BODY CLOTHING: A LITTLE
PERSONAL GROOMING: A LITTLE
TOILETING: A LITTLE
CLIMB 3 TO 5 STEPS WITH RAILING: A LOT
WALKING IN HOSPITAL ROOM: A LITTLE
PERSONAL GROOMING: A LITTLE
MOBILITY SCORE: 18
MOBILITY SCORE: 18

## 2024-06-23 ASSESSMENT — PAIN SCALES - GENERAL
PAINLEVEL_OUTOF10: 2
PAINLEVEL_OUTOF10: 3
PAINLEVEL_OUTOF10: 1

## 2024-06-23 ASSESSMENT — ACTIVITIES OF DAILY LIVING (ADL): LACK_OF_TRANSPORTATION: NO

## 2024-06-23 ASSESSMENT — PAIN DESCRIPTION - DESCRIPTORS
DESCRIPTORS: ACHING;SORE
DESCRIPTORS: ACHING;SORE

## 2024-06-23 ASSESSMENT — PAIN - FUNCTIONAL ASSESSMENT
PAIN_FUNCTIONAL_ASSESSMENT: 0-10
PAIN_FUNCTIONAL_ASSESSMENT: 0-10

## 2024-06-23 NOTE — PROGRESS NOTES
"CARDIAC SURGERY DAILY PROGRESS NOTE    Umair Carney is a 74 y.o. male, with a PMH of HTN, HLD, Prostate cancer s/p radiation in 2016, T2DM who was referred for nonrheumatic aortic valve stenosis.    OPERATION/PROCEDURE: s/p AVR and CABGx3 (LIMA-LAD, SVG-CX, SVG-PDA), LAAC with Usman Durant MD    CTICU Course: Uneventful    Transferred to T3 on 6/22/24    ==================================    Interval History:   No events overnight.     SUBJECTIVE:  Patient pleasant and sitting up in the chair with wife at bedside. Without specific complaints. Wife reports that the patient has the valle because he had previous radiation for prostate CA and he does not want it discontinued. I explained that we are not exactly ready for discharge, but prior to discharge, it will be necessary to discontinue and attempt voiding independently.     Objective   BP (!) 154/93 Comment: nurse was present  Pulse 74   Temp 37.2 °C (99 °F) (Temporal)   Resp 18   Ht 1.854 m (6' 1\")   Wt 98.4 kg (217 lb)   SpO2 96%   BMI 28.63 kg/m²   0-10 (Numeric) Pain Score: 2   3 Day Weight Change: Unable to Calculate    Intake and Output    Intake/Output Summary (Last 24 hours) at 6/23/2024 1523  Last data filed at 6/23/2024 1300  Gross per 24 hour   Intake --   Output 1945 ml   Net -1945 ml     Physical Exam  Vitals and nursing note reviewed.   Constitutional:       Appearance: Normal appearance.      Comments: Patient pleasant and wife at bedside.   Epidural catheters per acute pain service. Acute pain service plans to remove tomorrow (6/24).    HENT:      Head: Normocephalic.      Nose: Nose normal.      Mouth/Throat:      Mouth: Mucous membranes are moist.   Cardiovascular:      Rate and Rhythm: Normal rate and regular rhythm.      Pulses: Normal pulses.      Heart sounds: Normal heart sounds.      Comments: Tele: SR, rate 75 bpm  Pacing Wires to pacer with backup rate of 50, mA 10  Two mediastinal Cts y'd together  Pulmonary:      Effort: " Pulmonary effort is normal.      Breath sounds: Normal breath sounds.      Comments: IS: 2L  Abdominal:      General: Bowel sounds are normal.      Palpations: Abdomen is soft.      Comments: (+) BM 6/23  Appetite good.    Genitourinary:     Comments: Dunn catheter in place and draining well.   Musculoskeletal:         General: Normal range of motion.      Cervical back: Neck supple.      Comments: Strong U/L ext movement.   Skin:     General: Skin is warm and dry.      Capillary Refill: Capillary refill takes less than 2 seconds.      Comments: midsternal chest incision C/D/I, well approximated, no s/s infection, right SVG incision C/D/I, well approximated, no s/s infection, pravena dressing intact to midsternal incision     Neurological:      General: No focal deficit present.      Mental Status: He is alert and oriented to person, place, and time.   Psychiatric:         Mood and Affect: Mood normal.         Behavior: Behavior normal.         Medications  Scheduled medications  acetaminophen, 650 mg, oral, q6h  aspirin, 81 mg, oral, Daily  heparin (porcine), 5,000 Units, subcutaneous, q8h  insulin lispro, 0-5 Units, subcutaneous, TID  metoprolol tartrate, 12.5 mg, oral, BID  multivitamin with minerals, 1 tablet, oral, Daily  polyethylene glycol, 17 g, oral, BID  rosuvastatin, 20 mg, oral, Nightly  sennosides-docusate sodium, 2 tablet, oral, BID    Continuous medications   PRN medications  PRN medications: dextrose **OR** glucagon, naloxone, [DISCONTINUED] ondansetron **OR** ondansetron, oxyCODONE, oxyCODONE, oxygen    Labs  Results for orders placed or performed during the hospital encounter of 06/21/24 (from the past 24 hour(s))   POCT GLUCOSE   Result Value Ref Range    POCT Glucose 208 (H) 74 - 99 mg/dL   POCT GLUCOSE   Result Value Ref Range    POCT Glucose 271 (H) 74 - 99 mg/dL   POCT GLUCOSE   Result Value Ref Range    POCT Glucose 168 (H) 74 - 99 mg/dL   CBC   Result Value Ref Range    WBC 13.2 (H) 4.4 -  11.3 x10*3/uL    nRBC 0.0 0.0 - 0.0 /100 WBCs    RBC 3.64 (L) 4.50 - 5.90 x10*6/uL    Hemoglobin 10.7 (L) 13.5 - 17.5 g/dL    Hematocrit 32.2 (L) 41.0 - 52.0 %    MCV 89 80 - 100 fL    MCH 29.4 26.0 - 34.0 pg    MCHC 33.2 32.0 - 36.0 g/dL    RDW 14.5 11.5 - 14.5 %    Platelets 86 (L) 150 - 450 x10*3/uL   Magnesium   Result Value Ref Range    Magnesium 2.33 1.60 - 2.40 mg/dL   Renal Function Panel   Result Value Ref Range    Glucose 275 (H) 74 - 99 mg/dL    Sodium 135 (L) 136 - 145 mmol/L    Potassium 4.1 3.5 - 5.3 mmol/L    Chloride 102 98 - 107 mmol/L    Bicarbonate 22 21 - 32 mmol/L    Anion Gap 15 10 - 20 mmol/L    Urea Nitrogen 40 (H) 6 - 23 mg/dL    Creatinine 2.08 (H) 0.50 - 1.30 mg/dL    eGFR 33 (L) >60 mL/min/1.73m*2    Calcium 8.2 (L) 8.6 - 10.6 mg/dL    Phosphorus 3.4 2.5 - 4.9 mg/dL    Albumin 3.4 3.4 - 5.0 g/dL   Protime-INR   Result Value Ref Range    Protime 12.9 (H) 9.8 - 12.8 seconds    INR 1.1 0.9 - 1.1   CBC and Auto Differential   Result Value Ref Range    WBC 13.2 (H) 4.4 - 11.3 x10*3/uL    nRBC 0.0 0.0 - 0.0 /100 WBCs    RBC 3.64 (L) 4.50 - 5.90 x10*6/uL    Hemoglobin 10.7 (L) 13.5 - 17.5 g/dL    Hematocrit 32.2 (L) 41.0 - 52.0 %    MCV 89 80 - 100 fL    MCH 29.4 26.0 - 34.0 pg    MCHC 33.2 32.0 - 36.0 g/dL    RDW 14.5 11.5 - 14.5 %    Platelets 86 (L) 150 - 450 x10*3/uL    Neutrophils % 84.9 40.0 - 80.0 %    Immature Granulocytes %, Automated 0.6 0.0 - 0.9 %    Lymphocytes % 6.0 13.0 - 44.0 %    Monocytes % 8.2 2.0 - 10.0 %    Eosinophils % 0.1 0.0 - 6.0 %    Basophils % 0.2 0.0 - 2.0 %    Neutrophils Absolute 11.10 (H) 1.60 - 5.50 x10*3/uL    Immature Granulocytes Absolute, Automated 0.08 0.00 - 0.50 x10*3/uL    Lymphocytes Absolute 0.79 (L) 0.80 - 3.00 x10*3/uL    Monocytes Absolute 1.07 (H) 0.05 - 0.80 x10*3/uL    Eosinophils Absolute 0.01 0.00 - 0.40 x10*3/uL    Basophils Absolute 0.02 0.00 - 0.10 x10*3/uL   POCT GLUCOSE   Result Value Ref Range    POCT Glucose 212 (H) 74 - 99 mg/dL        "    IMPRESSION & PLAN:  POD # 2 s/p s/p AVR and CABGx3 (LIMA-LAD, SVG-CX, SVG-PDA), LAAC  - Increase activity/ ambulation; PT/OT  - Encourage IS, C/DB; respiratory therapy; on RA with SpO2 of 96%  - Cardiac rehab referral   - Continue cardiac meds: ASA, BB, statin   - Pain and anticonstipation meds  -  ordered 2v CXR   -  Ordered postop echo   - Remove epicardial wires prior to discharge   - Tele until discharge  - Optimize nutrition and electrolytes    Rhythm  - Tele: SR with 75 bpm  - Continue BB  - Adjust medications as tolerated    Hypertension: home meds: norvasc 10 mg daily, hydralazne 100 mg TID, lisinopril-hydrochlorothiazide 20-25 mg daily, metoprolol succinate XL 50 daily  Systolic (24hrs), Av , Min:117 , Max:154   - Continue metoprolol - adjust as needed  - Additional antihypertensives as needed    Hyperlipidemia: home Crestor 20 mg daily, Tricor 145 mg daily  No results found for: \"CHOL\", \"HDL\", \"VLDL\", \"TRIG\", \"NHDL\"   - Continue rosuvastatin at high dose  - Follow up lipid panel with PCP/ cardiologist for ongoing lipid management    DM: home meds: Amaryl 2 mg BID; Jardiance 25 mg daily, Metformin 1,000 mg BID  Lab Results   Component Value Date    HGBA1C 6.7 (H) 2024     Results from last 7 days   Lab Units 24  1200 24  0812 24  2036 24  1701 24  1144 24  0727 24  0443   POCT GLUCOSE mg/dL 212* 168* 271* 208* 237* 135* 154*   - Accuchecks premeal and at bedtime  - Diabetic diet  - Lispro premeal corrective scale    Acute Blood Loss Anemia   Recent Labs     24  0926 24  1455 24  0155 24  1505 24  0620 24  1104 24  1432   HGB 10.7*  10.7* 11.0* 11.6* 13.2* 15.2 15.5 15.9  15.9   HCT 32.2*  32.2* 31.7* 32.4* 37.3* 43.3 44.8 46.2  46.2   - MV x1mo  - Daily labs, transfuse as indicated    Thrombocytopenia  Recent Labs     24  0926 24  1455 24  0155 24  1505 24  0620 " 06/19/24  1104 06/05/24  1432   PLT 86*  86* 110* 112* 202 183 179 226  226   - Etiology likely postop/CPB related  - Continue to trend with daily CBCs    Volume/Electrolyte Status: Preop wt Weight: 95.1 kg (209 lb 10.5 oz)   Vitals:    06/23/24 0248   Weight: 98.4 kg (217 lb)   - Weight: 98.4  - Adjust diuresis as needed for postop cardiac surgery hypervolemia  - Replete electrolytes for hypokalemia/hypomagnesemia/hypophosphatemia as needed  - Daily weights and strict I&Os  - Daily RFP while admitted    VTE Prophylaxis: SCDs/TEDs, ambulation, SQ heparin  Code Status: Full Code    Dispo  - PT/OT recs Low intensity  - Would benefit from homecare for cardiac surgery carepath and RN visits  - Anticipate discharge 2-3 days, pending removal of valle without   - Will continue to assess discharge needs      Renetta Maloney, LEVI-CNP, APRN-CNS  Cardiac Surgery [facility]  [facility]  Team Phone [phone]    6/23/2024  3:23 PM

## 2024-06-23 NOTE — PROGRESS NOTES
06/23/24 1549   Discharge Planning   Living Arrangements Spouse/significant other   Support Systems Spouse/significant other   Assistance Needed Home   Type of Residence Private residence   Number of Stairs to Enter Residence 3   Number of Stairs Within Residence 0   Do you have animals or pets at home? No   Home or Post Acute Services None  (Pt willing to accept HC if needed)   Patient expects to be discharged to: Home   Does the patient need discharge transport arranged? No   Financial Resource Strain   How hard is it for you to pay for the very basics like food, housing, medical care, and heating? Not hard   Housing Stability   In the last 12 months, was there a time when you were not able to pay the mortgage or rent on time? N   In the last 12 months, how many places have you lived? 1   In the last 12 months, was there a time when you did not have a steady place to sleep or slept in a shelter (including now)? N   Transportation Needs   In the past 12 months, has lack of transportation kept you from medical appointments or from getting medications? no   In the past 12 months, has lack of transportation kept you from meetings, work, or from getting things needed for daily living? No       Transitional Care Coordinator Progress Note:     Assessment Note:  Met with pt, wife and I introduced myself as care coordinator and member of the Care Transitions team for discharge planning.   Pt feels safe at home.  Wife Ngoc provides transport to drs appts.  Pt's address, phone number and contact information was verified.  Pt does report they need orders for a wheeled walker, chux pads and shower chair.     Previous Home Care: None (willing to accept HC if needed)   DME: [facility]   Pharmacy: [facility]   Falls: 1  PCP: Hernan Parry, RN, BSN [facility]

## 2024-06-23 NOTE — CONSULTS
Inpatient consult to endocrinology  Consult performed by: Staci Ruth MD  Consult ordered by: Renetta Maloney, APRN-CNP, APRN-CNS  Reason for consult: Diabetes M.  Assessment/Recommendations: Diabetes M.        Reason For Consult  Diabetes Inpatient     History Of Present Illness  Umair Carney is a 74 y.o. male with PMHx of HTN, HLD, Prostate cancer s/p radiation in 2016, T2DM - Last A1C 6.7 6/24 who was referred to Dr. Durant for nonrheumatic aortic valve stenosis now s/p AVR and CABGx3 (LIMA-LAD, SVG-CX, SVG-PDA), LAAC. Endocrine is consulted for inpatient hyperglycemia management.     Hospital course - Insulin drip during CBG    Patient with DM2 Regimen:    Metformin 1000mg BID  Rybelsius   Jardiance 25mg     Denies recent hyperglycemic complications    Patient has been on Novolog in the past - discontinued for at least 3 years.     Managed by Endocrine - Kaye August  Past Medical History  He has a past medical history of Coronary artery disease, Hyperlipidemia, Hypertension, Prostate cancer (Multi), Skin cancer, and Type 2 diabetes mellitus (Multi).    Surgical History  He has a past surgical history that includes Colonoscopy; Knee Arthroplasty (Bilateral, 2016); Cataract extraction (Bilateral); and Cardiac catheterization.     Social History  He reports that he has never smoked. He has never used smokeless tobacco. He reports that he does not currently use alcohol. He reports that he does not use drugs.    Family History  Family History   Problem Relation Name Age of Onset    Heart disease Mother      Prostate cancer Father          Allergies  Patient has no known allergies.    Review of Systems As above      Physical Exam  Vitals:    06/23/24 1046   BP: 141/88   Pulse: 72   Resp: 18   Temp: 37 °C (98.6 °F)   SpO2: 96%    CCO 3  HEENT: YAKOVRLA   Abdo: +BS soft nt no HSM   LE: +pp no edema     ROS, PMH, FH/SH, surgical history and allergies have been reviewed.    Last Recorded Vitals  Blood pressure  "141/88, pulse 72, temperature 37 °C (98.6 °F), temperature source Temporal, resp. rate 18, height 1.854 m (6' 1\"), weight 98.4 kg (217 lb), SpO2 96%.    Relevant Results  Results from last 7 days   Lab Units 06/23/24  1200 06/23/24  0926 06/23/24  0812 06/22/24  2036 06/22/24  1701 06/22/24  1455 06/22/24  1144 06/22/24  0443 06/22/24  0155 06/21/24  1618 06/21/24  1505 06/21/24  0620   POCT GLUCOSE mg/dL 212*  --  168* 271* 208*  --  237*   < >  --    < >  --   --    GLUCOSE mg/dL  --  275*  --   --   --  197*  --   --  152*  --  133* 169*    < > = values in this interval not displayed.       Current Facility-Administered Medications:     acetaminophen (Tylenol) tablet 650 mg, 650 mg, oral, q6h, Chou C Rick, APRN-CNP, 650 mg at 06/23/24 0822    aspirin chewable tablet 81 mg, 81 mg, oral, Daily, Chou C Rick, APRN-CNP, 81 mg at 06/23/24 0818    dextrose 50 % injection 25 g, 25 g, intravenous, q15 min PRN **OR** glucagon (Glucagen) injection 1 mg, 1 mg, intramuscular, q15 min PRN, Chou C Rick, APRN-CNP    heparin (porcine) injection 5,000 Units, 5,000 Units, subcutaneous, q8h, Chou C Rick, APRN-CNP, 5,000 Units at 06/23/24 1152    insulin lispro (HumaLOG) injection 0-5 Units, 0-5 Units, subcutaneous, TID, Chou C Rick, APRN-CNP, 2 Units at 06/23/24 1321    metoprolol tartrate (Lopressor) tablet 12.5 mg, 12.5 mg, oral, BID, Chou C Rick, APRN-CNP, 12.5 mg at 06/23/24 0818    multivitamin with minerals 1 tablet, 1 tablet, oral, Daily, Chou C Rick, APRN-CNP, 1 tablet at 06/23/24 0817    naloxone (Narcan) injection 0.2 mg, 0.2 mg, intravenous, q5 min PRN, ARABELLA Torres    [DISCONTINUED] ondansetron (Zofran) tablet 4 mg, 4 mg, oral, q8h PRN **OR** ondansetron (Zofran) injection 4 mg, 4 mg, intravenous, q8h PRN, ARABELLA Torres    oxyCODONE (Roxicodone) immediate release tablet 10 mg, 10 mg, oral, q4h PRN, LEVI Torres-CNP    oxyCODONE (Roxicodone) immediate release tablet 5 mg, 5 mg, oral, q4h PRN, Effie DRUMMOND " Rick, APRN-CNP    oxygen (O2) therapy, , inhalation, Continuous PRN - O2/gases, Chou HODA Rick, APRN-CNP    polyethylene glycol (Glycolax, Miralax) packet 17 g, 17 g, oral, BID, Chou C Rick, APRN-CNP, 17 g at 06/23/24 0818    rosuvastatin (Crestor) tablet 20 mg, 20 mg, oral, Nightly, Chou C Rick, APRN-CNP, 20 mg at 06/22/24 2034    sennosides-docusate sodium (Hoa-Colace) 8.6-50 mg per tablet 2 tablet, 2 tablet, oral, BID, Chou C Rick, APRN-CNP, 2 tablet at 06/23/24 0817      Assessment/Plan   Principal Problem:    Aortic valve stenosis  Active Problems:    Aortic valve stenosis, etiology of cardiac valve disease unspecified  Umair Carney is a 74 y.o. male with PMHx of HTN, HLD, Prostate cancer s/p radiation in 2016, T2DM - Last A1C 6.7 6/24 who was referred to Dr. Durant for nonrheumatic aortic valve stenosis now s/p AVR and CABGx3 (LIMA-LAD, SVG-CX, SVG-PDA), LAAC. Endocrine is consulted for inpatient hyperglycemia management.     Hospital course - Insulin drip during CBG    Patient with DM2 Regimen:    Metformin 1000mg BID  Rybelsius   Jardiance 25mg     Denies recent hyperglycemic complications    Patient has been on Novolog in the past - discontinued for at least 3 years.     Managed by Endocrine - Kaye August    Weight 98.4 kg   # Inpatient Diabetes Mellitus Management Type 2 s/p CBG   Endocrine Recommendations -6/23:   Lantus 8 units PM   Lispro 4 units TID WL   SS# 1 - 1 unit for every 50>150 - Lispro    Please resume home medications upon discharge:   Metformin 1000mg BID  Rybelsius - outpatient consideration for Ozempic in the light of cardioprotection as well and predictability  Jardiance 25mg   Endocrine pager [phone]  Plan communicated to primary team   Patient discussed,  seen, and examined with Dr. Dorsey who agrees with the management plan.     Staci Ruth MD

## 2024-06-23 NOTE — PROGRESS NOTES
Umair Carney is a 74 y.o. year old male patient who presents for AVR and CABGx3 with Dr. Durant on 6/21/24. Acute Pain consulted for block for postoperative pain control.     Postop Pain HPI -   Palliative: relieved with IV analgesics and regional local anesthetics  Provocative: movement  Quality:  burning and aching  Radiation:  none  Severity:  3/10  Timing: constant    24-HOUR OPIOID CONSUMPTION:  No opioids     Scheduled medications  acetaminophen, 650 mg, oral, q6h  aspirin, 81 mg, oral, Daily  ceFAZolin, 2 g, intravenous, q8h  heparin (porcine), 5,000 Units, subcutaneous, q8h  insulin lispro, 0-5 Units, subcutaneous, TID  metoprolol tartrate, 12.5 mg, oral, BID  multivitamin with minerals, 1 tablet, oral, Daily  polyethylene glycol, 17 g, oral, BID  rosuvastatin, 20 mg, oral, Nightly  sennosides-docusate sodium, 2 tablet, oral, BID      Continuous medications       PRN medications  PRN medications: dextrose **OR** glucagon, naloxone, [DISCONTINUED] ondansetron **OR** ondansetron, oxyCODONE, oxyCODONE, oxygen     Physical Exam:  Constitutional:  no distress, alert and cooperative  Eyes: clear sclera  Head/Neck: No apparent injury, trachea midline  Respiratory/Thorax: Patent airways, thorax symmetric, breathing comfortably  Cardiovascular: no pitting edema  Gastrointestinal: Nondistended  Musculoskeletal: ROM intact  Extremities: no clubbing  Neurological: alert, alvarez x4  Psychological: Appropriate affect    Results for orders placed or performed during the hospital encounter of 06/21/24 (from the past 24 hour(s))   Calcium, Ionized   Result Value Ref Range    POCT Calcium, Ionized 1.18 1.1 - 1.33 mmol/L   Magnesium   Result Value Ref Range    Magnesium 2.46 (H) 1.60 - 2.40 mg/dL   CBC and Auto Differential   Result Value Ref Range    WBC 8.8 4.4 - 11.3 x10*3/uL    nRBC 0.0 0.0 - 0.0 /100 WBCs    RBC 3.96 (L) 4.50 - 5.90 x10*6/uL    Hemoglobin 11.6 (L) 13.5 - 17.5 g/dL    Hematocrit 32.4 (L) 41.0 - 52.0 %    MCV  82 80 - 100 fL    MCH 29.3 26.0 - 34.0 pg    MCHC 35.8 32.0 - 36.0 g/dL    RDW 14.0 11.5 - 14.5 %    Platelets 112 (L) 150 - 450 x10*3/uL    Neutrophils % 90.0 40.0 - 80.0 %    Immature Granulocytes %, Automated 0.7 0.0 - 0.9 %    Lymphocytes % 3.9 13.0 - 44.0 %    Monocytes % 5.3 2.0 - 10.0 %    Eosinophils % 0.0 0.0 - 6.0 %    Basophils % 0.1 0.0 - 2.0 %    Neutrophils Absolute 7.91 (H) 1.60 - 5.50 x10*3/uL    Immature Granulocytes Absolute, Automated 0.06 0.00 - 0.50 x10*3/uL    Lymphocytes Absolute 0.34 (L) 0.80 - 3.00 x10*3/uL    Monocytes Absolute 0.47 0.05 - 0.80 x10*3/uL    Eosinophils Absolute 0.00 0.00 - 0.40 x10*3/uL    Basophils Absolute 0.01 0.00 - 0.10 x10*3/uL   Troponin I, High Sensitivity   Result Value Ref Range    Troponin I, High Sensitivity 2,782 (HH) 0 - 53 ng/L   Comprehensive metabolic panel   Result Value Ref Range    Glucose 152 (H) 74 - 99 mg/dL    Sodium 141 136 - 145 mmol/L    Potassium 4.3 3.5 - 5.3 mmol/L    Chloride 109 (H) 98 - 107 mmol/L    Bicarbonate 22 21 - 32 mmol/L    Anion Gap 14 10 - 20 mmol/L    Urea Nitrogen 28 (H) 6 - 23 mg/dL    Creatinine 1.76 (H) 0.50 - 1.30 mg/dL    eGFR 40 (L) >60 mL/min/1.73m*2    Calcium 8.6 8.6 - 10.6 mg/dL    Albumin 3.6 3.4 - 5.0 g/dL    Alkaline Phosphatase 42 33 - 136 U/L    Total Protein 5.4 (L) 6.4 - 8.2 g/dL    AST 21 9 - 39 U/L    Bilirubin, Total 0.4 0.0 - 1.2 mg/dL    ALT 13 10 - 52 U/L   Phosphorus   Result Value Ref Range    Phosphorus 3.2 2.5 - 4.9 mg/dL   Blood Gas Arterial Full Panel   Result Value Ref Range    POCT pH, Arterial 7.42 7.38 - 7.42 pH    POCT pCO2, Arterial 33 (L) 38 - 42 mm Hg    POCT pO2, Arterial 109 (H) 85 - 95 mm Hg    POCT SO2, Arterial 99 94 - 100 %    POCT Oxy Hemoglobin, Arterial 97.2 94.0 - 98.0 %    POCT Hematocrit Calculated, Arterial 36.0 (L) 41.0 - 52.0 %    POCT Sodium, Arterial 139 136 - 145 mmol/L    POCT Potassium, Arterial 4.6 3.5 - 5.3 mmol/L    POCT Chloride, Arterial 109 (H) 98 - 107 mmol/L    POCT  Ionized Calcium, Arterial 1.18 1.10 - 1.33 mmol/L    POCT Glucose, Arterial 154 (H) 74 - 99 mg/dL    POCT Lactate, Arterial 1.6 0.4 - 2.0 mmol/L    POCT Base Excess, Arterial -2.4 (L) -2.0 - 3.0 mmol/L    POCT HCO3 Calculated, Arterial 21.4 (L) 22.0 - 26.0 mmol/L    POCT Hemoglobin, Arterial 11.9 (L) 13.5 - 17.5 g/dL    POCT Anion Gap, Arterial 13 10 - 25 mmo/L    Patient Temperature 37.0 degrees Celsius    FiO2 40 %    Ventilator Mode CPAP     Site of Arterial Puncture Arterial Line    POCT GLUCOSE   Result Value Ref Range    POCT Glucose 154 (H) 74 - 99 mg/dL   POCT GLUCOSE   Result Value Ref Range    POCT Glucose 135 (H) 74 - 99 mg/dL   POCT GLUCOSE   Result Value Ref Range    POCT Glucose 237 (H) 74 - 99 mg/dL   Renal Function Panel   Result Value Ref Range    Glucose 197 (H) 74 - 99 mg/dL    Sodium 137 136 - 145 mmol/L    Potassium 4.1 3.5 - 5.3 mmol/L    Chloride 105 98 - 107 mmol/L    Bicarbonate 20 (L) 21 - 32 mmol/L    Anion Gap 16 10 - 20 mmol/L    Urea Nitrogen 37 (H) 6 - 23 mg/dL    Creatinine 1.95 (H) 0.50 - 1.30 mg/dL    eGFR 36 (L) >60 mL/min/1.73m*2    Calcium 8.4 (L) 8.6 - 10.6 mg/dL    Phosphorus 3.7 2.5 - 4.9 mg/dL    Albumin 3.3 (L) 3.4 - 5.0 g/dL   Magnesium   Result Value Ref Range    Magnesium 2.37 1.60 - 2.40 mg/dL   CBC and Auto Differential   Result Value Ref Range    WBC 14.1 (H) 4.4 - 11.3 x10*3/uL    nRBC 0.0 0.0 - 0.0 /100 WBCs    RBC 3.77 (L) 4.50 - 5.90 x10*6/uL    Hemoglobin 11.0 (L) 13.5 - 17.5 g/dL    Hematocrit 31.7 (L) 41.0 - 52.0 %    MCV 84 80 - 100 fL    MCH 29.2 26.0 - 34.0 pg    MCHC 34.7 32.0 - 36.0 g/dL    RDW 14.5 11.5 - 14.5 %    Platelets 110 (L) 150 - 450 x10*3/uL    Neutrophils % 87.7 40.0 - 80.0 %    Immature Granulocytes %, Automated 0.8 0.0 - 0.9 %    Lymphocytes % 4.1 13.0 - 44.0 %    Monocytes % 7.3 2.0 - 10.0 %    Eosinophils % 0.0 0.0 - 6.0 %    Basophils % 0.1 0.0 - 2.0 %    Neutrophils Absolute 12.38 (H) 1.60 - 5.50 x10*3/uL    Immature Granulocytes Absolute,  Automated 0.11 0.00 - 0.50 x10*3/uL    Lymphocytes Absolute 0.58 (L) 0.80 - 3.00 x10*3/uL    Monocytes Absolute 1.03 (H) 0.05 - 0.80 x10*3/uL    Eosinophils Absolute 0.00 0.00 - 0.40 x10*3/uL    Basophils Absolute 0.01 0.00 - 0.10 x10*3/uL   POCT GLUCOSE   Result Value Ref Range    POCT Glucose 208 (H) 74 - 99 mg/dL   POCT GLUCOSE   Result Value Ref Range    POCT Glucose 271 (H) 74 - 99 mg/dL            Past Medical History:   Diagnosis Date    Coronary artery disease     Hyperlipidemia     Hypertension     Prostate cancer (Multi)     s/p radiation    Skin cancer     removed    Type 2 diabetes mellitus (Multi)         Past Surgical History:   Procedure Laterality Date    CARDIAC CATHETERIZATION      CATARACT EXTRACTION Bilateral     COLONOSCOPY      KNEE ARTHROPLASTY Bilateral 2016        Family History   Problem Relation Name Age of Onset    Heart disease Mother      Prostate cancer Father          Social History     Socioeconomic History    Marital status:      Spouse name: Not on file    Number of children: Not on file    Years of education: Not on file    Highest education level: Not on file   Occupational History    Not on file   Tobacco Use    Smoking status: Never    Smokeless tobacco: Never   Vaping Use    Vaping status: Never Used   Substance and Sexual Activity    Alcohol use: Not Currently     Comment: approximately one per week    Drug use: Never    Sexual activity: Defer   Other Topics Concern    Not on file   Social History Narrative    Not on file     Social Determinants of Health     Financial Resource Strain: Not on file   Food Insecurity: Not on file   Transportation Needs: Not on file   Physical Activity: Not on file   Stress: Not on file   Social Connections: Not on file   Intimate Partner Violence: Not on file   Housing Stability: Not on file        No Known Allergies      Review of Systems  Gen: No fatigue, anorexia, insomnia, fever.   Eyes: No vision loss, double vision, drainage, eye  pain.   ENT: No pharyngitis, dry mouth, no hearing changes or ear discharge  Cardiac: No chest pain, palpitations, syncope, near syncope.   Pulmonary: No shortness of breath, cough, hemoptysis.   Heme/lymph: No swollen glands, fever, bleeding.   GI: No abdominal pain, change in bowel habits, melena, hematemesis, hematochezia, nausea, vomiting, diarrhea.   : No discharge, dysuria, frequency, urgency, hematuria.  Endo: No polyuria or weight loss.   Musculoskeletal: Negative for any pain or loss of ROM/weakness  Skin: No rashes or lesions  Neuro: Normal speech, no numbness or weakness. No gait difficulties  Review of systems is otherwise negative unless stated above or in history of present illness.    Physical Exam:  Constitutional:  no distress, alert and cooperative  Eyes: clear sclera  Head/Neck: No apparent injury, trachea midline  Respiratory/Thorax: Patent airways, thorax symmetric, breathing comfortably  Cardiovascular: no pitting edema  Gastrointestinal: Nondistended  Musculoskeletal: ROM intact  Extremities: no clubbing  Neurological: alert, alvarez x4  Psychological: Appropriate affect    Results for orders placed or performed during the hospital encounter of 06/21/24 (from the past 24 hour(s))   Calcium, Ionized   Result Value Ref Range    POCT Calcium, Ionized 1.18 1.1 - 1.33 mmol/L   Magnesium   Result Value Ref Range    Magnesium 2.46 (H) 1.60 - 2.40 mg/dL   CBC and Auto Differential   Result Value Ref Range    WBC 8.8 4.4 - 11.3 x10*3/uL    nRBC 0.0 0.0 - 0.0 /100 WBCs    RBC 3.96 (L) 4.50 - 5.90 x10*6/uL    Hemoglobin 11.6 (L) 13.5 - 17.5 g/dL    Hematocrit 32.4 (L) 41.0 - 52.0 %    MCV 82 80 - 100 fL    MCH 29.3 26.0 - 34.0 pg    MCHC 35.8 32.0 - 36.0 g/dL    RDW 14.0 11.5 - 14.5 %    Platelets 112 (L) 150 - 450 x10*3/uL    Neutrophils % 90.0 40.0 - 80.0 %    Immature Granulocytes %, Automated 0.7 0.0 - 0.9 %    Lymphocytes % 3.9 13.0 - 44.0 %    Monocytes % 5.3 2.0 - 10.0 %    Eosinophils % 0.0 0.0 -  6.0 %    Basophils % 0.1 0.0 - 2.0 %    Neutrophils Absolute 7.91 (H) 1.60 - 5.50 x10*3/uL    Immature Granulocytes Absolute, Automated 0.06 0.00 - 0.50 x10*3/uL    Lymphocytes Absolute 0.34 (L) 0.80 - 3.00 x10*3/uL    Monocytes Absolute 0.47 0.05 - 0.80 x10*3/uL    Eosinophils Absolute 0.00 0.00 - 0.40 x10*3/uL    Basophils Absolute 0.01 0.00 - 0.10 x10*3/uL   Troponin I, High Sensitivity   Result Value Ref Range    Troponin I, High Sensitivity 2,782 (HH) 0 - 53 ng/L   Comprehensive metabolic panel   Result Value Ref Range    Glucose 152 (H) 74 - 99 mg/dL    Sodium 141 136 - 145 mmol/L    Potassium 4.3 3.5 - 5.3 mmol/L    Chloride 109 (H) 98 - 107 mmol/L    Bicarbonate 22 21 - 32 mmol/L    Anion Gap 14 10 - 20 mmol/L    Urea Nitrogen 28 (H) 6 - 23 mg/dL    Creatinine 1.76 (H) 0.50 - 1.30 mg/dL    eGFR 40 (L) >60 mL/min/1.73m*2    Calcium 8.6 8.6 - 10.6 mg/dL    Albumin 3.6 3.4 - 5.0 g/dL    Alkaline Phosphatase 42 33 - 136 U/L    Total Protein 5.4 (L) 6.4 - 8.2 g/dL    AST 21 9 - 39 U/L    Bilirubin, Total 0.4 0.0 - 1.2 mg/dL    ALT 13 10 - 52 U/L   Phosphorus   Result Value Ref Range    Phosphorus 3.2 2.5 - 4.9 mg/dL   Blood Gas Arterial Full Panel   Result Value Ref Range    POCT pH, Arterial 7.42 7.38 - 7.42 pH    POCT pCO2, Arterial 33 (L) 38 - 42 mm Hg    POCT pO2, Arterial 109 (H) 85 - 95 mm Hg    POCT SO2, Arterial 99 94 - 100 %    POCT Oxy Hemoglobin, Arterial 97.2 94.0 - 98.0 %    POCT Hematocrit Calculated, Arterial 36.0 (L) 41.0 - 52.0 %    POCT Sodium, Arterial 139 136 - 145 mmol/L    POCT Potassium, Arterial 4.6 3.5 - 5.3 mmol/L    POCT Chloride, Arterial 109 (H) 98 - 107 mmol/L    POCT Ionized Calcium, Arterial 1.18 1.10 - 1.33 mmol/L    POCT Glucose, Arterial 154 (H) 74 - 99 mg/dL    POCT Lactate, Arterial 1.6 0.4 - 2.0 mmol/L    POCT Base Excess, Arterial -2.4 (L) -2.0 - 3.0 mmol/L    POCT HCO3 Calculated, Arterial 21.4 (L) 22.0 - 26.0 mmol/L    POCT Hemoglobin, Arterial 11.9 (L) 13.5 - 17.5 g/dL     POCT Anion Gap, Arterial 13 10 - 25 mmo/L    Patient Temperature 37.0 degrees Celsius    FiO2 40 %    Ventilator Mode CPAP     Site of Arterial Puncture Arterial Line    POCT GLUCOSE   Result Value Ref Range    POCT Glucose 154 (H) 74 - 99 mg/dL   POCT GLUCOSE   Result Value Ref Range    POCT Glucose 135 (H) 74 - 99 mg/dL   POCT GLUCOSE   Result Value Ref Range    POCT Glucose 237 (H) 74 - 99 mg/dL   Renal Function Panel   Result Value Ref Range    Glucose 197 (H) 74 - 99 mg/dL    Sodium 137 136 - 145 mmol/L    Potassium 4.1 3.5 - 5.3 mmol/L    Chloride 105 98 - 107 mmol/L    Bicarbonate 20 (L) 21 - 32 mmol/L    Anion Gap 16 10 - 20 mmol/L    Urea Nitrogen 37 (H) 6 - 23 mg/dL    Creatinine 1.95 (H) 0.50 - 1.30 mg/dL    eGFR 36 (L) >60 mL/min/1.73m*2    Calcium 8.4 (L) 8.6 - 10.6 mg/dL    Phosphorus 3.7 2.5 - 4.9 mg/dL    Albumin 3.3 (L) 3.4 - 5.0 g/dL   Magnesium   Result Value Ref Range    Magnesium 2.37 1.60 - 2.40 mg/dL   CBC and Auto Differential   Result Value Ref Range    WBC 14.1 (H) 4.4 - 11.3 x10*3/uL    nRBC 0.0 0.0 - 0.0 /100 WBCs    RBC 3.77 (L) 4.50 - 5.90 x10*6/uL    Hemoglobin 11.0 (L) 13.5 - 17.5 g/dL    Hematocrit 31.7 (L) 41.0 - 52.0 %    MCV 84 80 - 100 fL    MCH 29.2 26.0 - 34.0 pg    MCHC 34.7 32.0 - 36.0 g/dL    RDW 14.5 11.5 - 14.5 %    Platelets 110 (L) 150 - 450 x10*3/uL    Neutrophils % 87.7 40.0 - 80.0 %    Immature Granulocytes %, Automated 0.8 0.0 - 0.9 %    Lymphocytes % 4.1 13.0 - 44.0 %    Monocytes % 7.3 2.0 - 10.0 %    Eosinophils % 0.0 0.0 - 6.0 %    Basophils % 0.1 0.0 - 2.0 %    Neutrophils Absolute 12.38 (H) 1.60 - 5.50 x10*3/uL    Immature Granulocytes Absolute, Automated 0.11 0.00 - 0.50 x10*3/uL    Lymphocytes Absolute 0.58 (L) 0.80 - 3.00 x10*3/uL    Monocytes Absolute 1.03 (H) 0.05 - 0.80 x10*3/uL    Eosinophils Absolute 0.00 0.00 - 0.40 x10*3/uL    Basophils Absolute 0.01 0.00 - 0.10 x10*3/uL   POCT GLUCOSE   Result Value Ref Range    POCT Glucose 208 (H) 74 - 99 mg/dL    POCT GLUCOSE   Result Value Ref Range    POCT Glucose 271 (H) 74 - 99 mg/dL        Umair Carney is a 74 y.o. year old male patient who presents for AVR and CABGx3 with Dr. Durant on 6/21/24. Acute Pain consulted for block for postoperative pain control.   Plan:    - Bilateral SAP blocks with catheters as well as bilateral subcostal TAP blocks performed postoperatively in the OR on 6/21/24  - Plan to remove catheters tomorrow , please turn off pump if ropivacaine infusion bag is empty   - Continue Ambit pump with Ropivacaine 0.2%/NaCl 0.9% 500mL, Rate 7 cc/hr bilaterally  - Ambit medication will not interfere with pain medication prescribed by the primary team.   - Please be aware of local anesthetic toxic dose and absorption variability before considering lidocaine patches  - Acute pain service will follow while catheters in place  - Rest of pain management per primary team    Acute Pain Resident  pg [phone] ph [phone]

## 2024-06-24 ENCOUNTER — APPOINTMENT (OUTPATIENT)
Dept: CARDIOLOGY | Facility: HOSPITAL | Age: 74
DRG: 220 | End: 2024-06-24
Payer: MEDICARE

## 2024-06-24 ENCOUNTER — APPOINTMENT (OUTPATIENT)
Dept: RADIOLOGY | Facility: HOSPITAL | Age: 74
DRG: 220 | End: 2024-06-24
Payer: MEDICARE

## 2024-06-24 LAB
ALBUMIN SERPL BCP-MCNC: 3.2 G/DL (ref 3.4–5)
ALBUMIN SERPL BCP-MCNC: 3.2 G/DL (ref 3.4–5)
ALP SERPL-CCNC: 48 U/L (ref 33–136)
ALT SERPL W P-5'-P-CCNC: 4 U/L (ref 10–52)
ANION GAP SERPL CALC-SCNC: 14 MMOL/L (ref 10–20)
AORTIC VALVE MEAN GRADIENT: 9 MMHG
AORTIC VALVE PEAK VELOCITY: 2.04 M/S
AST SERPL W P-5'-P-CCNC: 15 U/L (ref 9–39)
AV PEAK GRADIENT: 16.6 MMHG
AVA (PEAK VEL): 1.28 CM2
AVA (VTI): 1.53 CM2
BASOPHILS # BLD AUTO: 0.01 X10*3/UL (ref 0–0.1)
BASOPHILS NFR BLD AUTO: 0.1 %
BILIRUB DIRECT SERPL-MCNC: 0.1 MG/DL (ref 0–0.3)
BILIRUB SERPL-MCNC: 0.4 MG/DL (ref 0–1.2)
BUN SERPL-MCNC: 35 MG/DL (ref 6–23)
CALCIUM SERPL-MCNC: 8.3 MG/DL (ref 8.6–10.6)
CHLORIDE SERPL-SCNC: 106 MMOL/L (ref 98–107)
CO2 SERPL-SCNC: 23 MMOL/L (ref 21–32)
CREAT SERPL-MCNC: 1.56 MG/DL (ref 0.5–1.3)
EGFRCR SERPLBLD CKD-EPI 2021: 47 ML/MIN/1.73M*2
EJECTION FRACTION APICAL 4 CHAMBER: 17.6
EJECTION FRACTION: 45 %
EOSINOPHIL # BLD AUTO: 0.04 X10*3/UL (ref 0–0.4)
EOSINOPHIL NFR BLD AUTO: 0.4 %
ERYTHROCYTE [DISTWIDTH] IN BLOOD BY AUTOMATED COUNT: 14.2 % (ref 11.5–14.5)
ERYTHROCYTE [DISTWIDTH] IN BLOOD BY AUTOMATED COUNT: 14.2 % (ref 11.5–14.5)
GLUCOSE BLD MANUAL STRIP-MCNC: 130 MG/DL (ref 74–99)
GLUCOSE BLD MANUAL STRIP-MCNC: 155 MG/DL (ref 74–99)
GLUCOSE BLD MANUAL STRIP-MCNC: 174 MG/DL (ref 74–99)
GLUCOSE BLD MANUAL STRIP-MCNC: 192 MG/DL (ref 74–99)
GLUCOSE SERPL-MCNC: 158 MG/DL (ref 74–99)
HCT VFR BLD AUTO: 30.8 % (ref 41–52)
HCT VFR BLD AUTO: 30.8 % (ref 41–52)
HGB BLD-MCNC: 10.2 G/DL (ref 13.5–17.5)
HGB BLD-MCNC: 10.2 G/DL (ref 13.5–17.5)
IMM GRANULOCYTES # BLD AUTO: 0.06 X10*3/UL (ref 0–0.5)
IMM GRANULOCYTES NFR BLD AUTO: 0.6 % (ref 0–0.9)
INR PPP: 1.2 (ref 0.9–1.1)
LEFT ATRIUM VOLUME AREA LENGTH INDEX BSA: 38.1 ML/M2
LEFT VENTRICLE INTERNAL DIMENSION DIASTOLE: 6.1 CM (ref 3.5–6)
LEFT VENTRICULAR OUTFLOW TRACT DIAMETER: 2 CM
LYMPHOCYTES # BLD AUTO: 0.73 X10*3/UL (ref 0.8–3)
LYMPHOCYTES NFR BLD AUTO: 7.6 %
MAGNESIUM SERPL-MCNC: 2.38 MG/DL (ref 1.6–2.4)
MCH RBC QN AUTO: 28.6 PG (ref 26–34)
MCH RBC QN AUTO: 28.6 PG (ref 26–34)
MCHC RBC AUTO-ENTMCNC: 33.1 G/DL (ref 32–36)
MCHC RBC AUTO-ENTMCNC: 33.1 G/DL (ref 32–36)
MCV RBC AUTO: 86 FL (ref 80–100)
MCV RBC AUTO: 86 FL (ref 80–100)
MITRAL VALVE E/A RATIO: 1.28
MONOCYTES # BLD AUTO: 0.78 X10*3/UL (ref 0.05–0.8)
MONOCYTES NFR BLD AUTO: 8.1 %
NEUTROPHILS # BLD AUTO: 7.97 X10*3/UL (ref 1.6–5.5)
NEUTROPHILS NFR BLD AUTO: 83.2 %
NRBC BLD-RTO: 0 /100 WBCS (ref 0–0)
NRBC BLD-RTO: 0 /100 WBCS (ref 0–0)
PHOSPHATE SERPL-MCNC: 2.5 MG/DL (ref 2.5–4.9)
PLATELET # BLD AUTO: 78 X10*3/UL (ref 150–450)
PLATELET # BLD AUTO: 78 X10*3/UL (ref 150–450)
POTASSIUM SERPL-SCNC: 3.6 MMOL/L (ref 3.5–5.3)
PROT SERPL-MCNC: 5.7 G/DL (ref 6.4–8.2)
PROTHROMBIN TIME: 13.1 SECONDS (ref 9.8–12.8)
RBC # BLD AUTO: 3.57 X10*6/UL (ref 4.5–5.9)
RBC # BLD AUTO: 3.57 X10*6/UL (ref 4.5–5.9)
RIGHT VENTRICLE FREE WALL PEAK S': 7.36 CM/S
RIGHT VENTRICLE PEAK SYSTOLIC PRESSURE: 29.2 MMHG
SODIUM SERPL-SCNC: 139 MMOL/L (ref 136–145)
WBC # BLD AUTO: 9.6 X10*3/UL (ref 4.4–11.3)
WBC # BLD AUTO: 9.6 X10*3/UL (ref 4.4–11.3)

## 2024-06-24 PROCEDURE — 2500000002 HC RX 250 W HCPCS SELF ADMINISTERED DRUGS (ALT 637 FOR MEDICARE OP, ALT 636 FOR OP/ED)

## 2024-06-24 PROCEDURE — 82947 ASSAY GLUCOSE BLOOD QUANT: CPT | Mod: 91

## 2024-06-24 PROCEDURE — 2500000001 HC RX 250 WO HCPCS SELF ADMINISTERED DRUGS (ALT 637 FOR MEDICARE OP): Performed by: STUDENT IN AN ORGANIZED HEALTH CARE EDUCATION/TRAINING PROGRAM

## 2024-06-24 PROCEDURE — 71046 X-RAY EXAM CHEST 2 VIEWS: CPT | Performed by: RADIOLOGY

## 2024-06-24 PROCEDURE — 93306 TTE W/DOPPLER COMPLETE: CPT | Performed by: STUDENT IN AN ORGANIZED HEALTH CARE EDUCATION/TRAINING PROGRAM

## 2024-06-24 PROCEDURE — 83735 ASSAY OF MAGNESIUM: CPT

## 2024-06-24 PROCEDURE — 2500000001 HC RX 250 WO HCPCS SELF ADMINISTERED DRUGS (ALT 637 FOR MEDICARE OP)

## 2024-06-24 PROCEDURE — 99231 SBSQ HOSP IP/OBS SF/LOW 25: CPT

## 2024-06-24 PROCEDURE — 85610 PROTHROMBIN TIME: CPT

## 2024-06-24 PROCEDURE — 82040 ASSAY OF SERUM ALBUMIN: CPT | Performed by: THORACIC SURGERY (CARDIOTHORACIC VASCULAR SURGERY)

## 2024-06-24 PROCEDURE — 80053 COMPREHEN METABOLIC PANEL: CPT

## 2024-06-24 PROCEDURE — 2500000001 HC RX 250 WO HCPCS SELF ADMINISTERED DRUGS (ALT 637 FOR MEDICARE OP): Performed by: NURSE PRACTITIONER

## 2024-06-24 PROCEDURE — 85025 COMPLETE CBC W/AUTO DIFF WBC: CPT

## 2024-06-24 PROCEDURE — 2500000004 HC RX 250 GENERAL PHARMACY W/ HCPCS (ALT 636 FOR OP/ED)

## 2024-06-24 PROCEDURE — 2500000002 HC RX 250 W HCPCS SELF ADMINISTERED DRUGS (ALT 637 FOR MEDICARE OP, ALT 636 FOR OP/ED): Performed by: NURSE PRACTITIONER

## 2024-06-24 PROCEDURE — 2500000004 HC RX 250 GENERAL PHARMACY W/ HCPCS (ALT 636 FOR OP/ED): Performed by: NURSE PRACTITIONER

## 2024-06-24 PROCEDURE — 85027 COMPLETE CBC AUTOMATED: CPT

## 2024-06-24 PROCEDURE — 93306 TTE W/DOPPLER COMPLETE: CPT

## 2024-06-24 PROCEDURE — 71046 X-RAY EXAM CHEST 2 VIEWS: CPT

## 2024-06-24 PROCEDURE — 1200000002 HC GENERAL ROOM WITH TELEMETRY DAILY

## 2024-06-24 PROCEDURE — 99232 SBSQ HOSP IP/OBS MODERATE 35: CPT | Performed by: NURSE PRACTITIONER

## 2024-06-24 RX ORDER — METOPROLOL TARTRATE 25 MG/1
25 TABLET, FILM COATED ORAL 2 TIMES DAILY
Status: DISCONTINUED | OUTPATIENT
Start: 2024-06-24 | End: 2024-06-27 | Stop reason: HOSPADM

## 2024-06-24 RX ORDER — AMLODIPINE BESYLATE 5 MG/1
5 TABLET ORAL DAILY
Status: DISCONTINUED | OUTPATIENT
Start: 2024-06-24 | End: 2024-06-25

## 2024-06-24 RX ORDER — POTASSIUM CHLORIDE 20 MEQ/1
40 TABLET, EXTENDED RELEASE ORAL ONCE
Status: COMPLETED | OUTPATIENT
Start: 2024-06-24 | End: 2024-06-24

## 2024-06-24 RX ORDER — TAMSULOSIN HYDROCHLORIDE 0.4 MG/1
0.4 CAPSULE ORAL DAILY
Status: DISCONTINUED | OUTPATIENT
Start: 2024-06-24 | End: 2024-06-26

## 2024-06-24 RX ORDER — LISINOPRIL 5 MG/1
5 TABLET ORAL DAILY
Status: DISCONTINUED | OUTPATIENT
Start: 2024-06-24 | End: 2024-06-27

## 2024-06-24 ASSESSMENT — PAIN SCALES - GENERAL: PAINLEVEL_OUTOF10: 2

## 2024-06-24 ASSESSMENT — PAIN - FUNCTIONAL ASSESSMENT: PAIN_FUNCTIONAL_ASSESSMENT: 0-10

## 2024-06-24 NOTE — PROGRESS NOTES
"CARDIAC SURGERY DAILY PROGRESS NOTE    Umair Carney is a 74 y.o. male, with a PMH of HTN, HLD, Prostate cancer s/p radiation in 2016, T2DM who was referred for nonrheumatic aortic valve stenosis.    OPERATION/PROCEDURE: s/p AVR and CABGx3 (LIMA-LAD, SVG-CX, SVG-PDA), LAAC with Usman Durant MD    CTICU Course: Uneventful    Transferred to T3 on 6/22/24    ==================================    Interval History:   No events overnight.     SUBJECTIVE:  - sitting up in chair  - 2view CXR and echo today  - now with some hematuria, cherry red, hx of prostate CA s/p radiation  - hx of urinary retention with valle needing to be replaced when he had knee surgery  - will start tamsulosin 0.4mg daily    - Acute pain removed b/l catheter removed with intact tips   - start lisinopril 5mg daily today     Objective   /87 (BP Location: Right arm, Patient Position: Sitting)   Pulse 96   Temp 36.9 °C (98.4 °F) (Temporal)   Resp 18   Ht 1.854 m (6' 1\")   Wt 98.4 kg (216 lb 14.4 oz)   SpO2 95%   BMI 28.62 kg/m²   0-10 (Numeric) Pain Score: 1   3 Day Weight Change: 1.095 kg (2 lb 6.6 oz) per day    Intake and Output    Intake/Output Summary (Last 24 hours) at 6/24/2024 1413  Last data filed at 6/24/2024 1001  Gross per 24 hour   Intake --   Output 356 ml   Net -356 ml     Physical Exam  Vitals and nursing note reviewed.   Constitutional:       Appearance: Normal appearance.      Comments: Patient pleasant and wife at bedside.   Epidural catheters per acute pain service. Acute pain service plans to remove tomorrow (6/24).    HENT:      Head: Normocephalic.      Nose: Nose normal.      Mouth/Throat:      Mouth: Mucous membranes are moist.   Cardiovascular:      Rate and Rhythm: Normal rate and regular rhythm.      Pulses: Normal pulses.      Heart sounds: Normal heart sounds.      Comments: Tele: SR, rate 75 bpm  Pacing Wires to pacer with backup rate of 50, mA 10  Two mediastinal Cts y'd together  Pulmonary:      " Effort: Pulmonary effort is normal.      Breath sounds: Normal breath sounds.      Comments: IS: 2L  Abdominal:      General: Bowel sounds are normal.      Palpations: Abdomen is soft.      Comments: (+) BM 6/23  Appetite good.    Genitourinary:     Comments: Dunn catheter in place and draining well, now cherry red hematuria   Musculoskeletal:         General: Normal range of motion.      Cervical back: Neck supple.      Comments: Strong U/L ext movement.   Skin:     General: Skin is warm and dry.      Capillary Refill: Capillary refill takes less than 2 seconds.      Comments: midsternal chest incision C/D/I, well approximated, no s/s infection, right SVG incision C/D/I, well approximated, no s/s infection, pravena dressing intact to midsternal incision     Neurological:      General: No focal deficit present.      Mental Status: He is alert and oriented to person, place, and time.   Psychiatric:         Mood and Affect: Mood normal.         Behavior: Behavior normal.       Medications  Scheduled medications  acetaminophen, 650 mg, oral, q6h  amLODIPine, 5 mg, oral, Daily  aspirin, 81 mg, oral, Daily  heparin (porcine), 5,000 Units, subcutaneous, q8h  insulin glargine, 8 Units, subcutaneous, q24h  insulin lispro, 0-5 Units, subcutaneous, TID  insulin lispro, 4 Units, subcutaneous, TID  metoprolol tartrate, 25 mg, oral, BID  multivitamin with minerals, 1 tablet, oral, Daily  polyethylene glycol, 17 g, oral, BID  potassium chloride CR, 40 mEq, oral, Once  rosuvastatin, 20 mg, oral, Nightly  sennosides-docusate sodium, 2 tablet, oral, BID  tamsulosin, 0.4 mg, oral, Daily    Continuous medications   PRN medications  PRN medications: dextrose **OR** glucagon, naloxone, [DISCONTINUED] ondansetron **OR** ondansetron, oxyCODONE, oxyCODONE, oxygen    Labs  Results for orders placed or performed during the hospital encounter of 06/21/24 (from the past 24 hour(s))   POCT GLUCOSE   Result Value Ref Range    POCT Glucose 183  (H) 74 - 99 mg/dL   POCT GLUCOSE   Result Value Ref Range    POCT Glucose 196 (H) 74 - 99 mg/dL   CBC   Result Value Ref Range    WBC 9.6 4.4 - 11.3 x10*3/uL    nRBC 0.0 0.0 - 0.0 /100 WBCs    RBC 3.57 (L) 4.50 - 5.90 x10*6/uL    Hemoglobin 10.2 (L) 13.5 - 17.5 g/dL    Hematocrit 30.8 (L) 41.0 - 52.0 %    MCV 86 80 - 100 fL    MCH 28.6 26.0 - 34.0 pg    MCHC 33.1 32.0 - 36.0 g/dL    RDW 14.2 11.5 - 14.5 %    Platelets 78 (L) 150 - 450 x10*3/uL   Magnesium   Result Value Ref Range    Magnesium 2.38 1.60 - 2.40 mg/dL   Renal Function Panel   Result Value Ref Range    Glucose 158 (H) 74 - 99 mg/dL    Sodium 139 136 - 145 mmol/L    Potassium 3.6 3.5 - 5.3 mmol/L    Chloride 106 98 - 107 mmol/L    Bicarbonate 23 21 - 32 mmol/L    Anion Gap 14 10 - 20 mmol/L    Urea Nitrogen 35 (H) 6 - 23 mg/dL    Creatinine 1.56 (H) 0.50 - 1.30 mg/dL    eGFR 47 (L) >60 mL/min/1.73m*2    Calcium 8.3 (L) 8.6 - 10.6 mg/dL    Phosphorus 2.5 2.5 - 4.9 mg/dL    Albumin 3.2 (L) 3.4 - 5.0 g/dL   Protime-INR   Result Value Ref Range    Protime 13.1 (H) 9.8 - 12.8 seconds    INR 1.2 (H) 0.9 - 1.1   CBC and Auto Differential   Result Value Ref Range    WBC 9.6 4.4 - 11.3 x10*3/uL    nRBC 0.0 0.0 - 0.0 /100 WBCs    RBC 3.57 (L) 4.50 - 5.90 x10*6/uL    Hemoglobin 10.2 (L) 13.5 - 17.5 g/dL    Hematocrit 30.8 (L) 41.0 - 52.0 %    MCV 86 80 - 100 fL    MCH 28.6 26.0 - 34.0 pg    MCHC 33.1 32.0 - 36.0 g/dL    RDW 14.2 11.5 - 14.5 %    Platelets 78 (L) 150 - 450 x10*3/uL    Neutrophils % 83.2 40.0 - 80.0 %    Immature Granulocytes %, Automated 0.6 0.0 - 0.9 %    Lymphocytes % 7.6 13.0 - 44.0 %    Monocytes % 8.1 2.0 - 10.0 %    Eosinophils % 0.4 0.0 - 6.0 %    Basophils % 0.1 0.0 - 2.0 %    Neutrophils Absolute 7.97 (H) 1.60 - 5.50 x10*3/uL    Immature Granulocytes Absolute, Automated 0.06 0.00 - 0.50 x10*3/uL    Lymphocytes Absolute 0.73 (L) 0.80 - 3.00 x10*3/uL    Monocytes Absolute 0.78 0.05 - 0.80 x10*3/uL    Eosinophils Absolute 0.04 0.00 - 0.40  "x10*3/uL    Basophils Absolute 0.01 0.00 - 0.10 x10*3/uL   Hepatic function panel   Result Value Ref Range    Albumin 3.2 (L) 3.4 - 5.0 g/dL    Bilirubin, Total 0.4 0.0 - 1.2 mg/dL    Bilirubin, Direct 0.1 0.0 - 0.3 mg/dL    Alkaline Phosphatase 48 33 - 136 U/L    ALT 4 (L) 10 - 52 U/L    AST 15 9 - 39 U/L    Total Protein 5.7 (L) 6.4 - 8.2 g/dL   POCT GLUCOSE   Result Value Ref Range    POCT Glucose 192 (H) 74 - 99 mg/dL   POCT GLUCOSE   Result Value Ref Range    POCT Glucose 155 (H) 74 - 99 mg/dL        IMPRESSION & PLAN:  POD #3 s/p s/p AVR and CABGx3 (LIMA-LAD, SVG-CX, SVG-PDA), LAAC  - Increase activity/ ambulation; PT/OT  - Encourage IS, C/DB; respiratory therapy; on RA with SpO2 of 96%  - Cardiac rehab referral   - Continue cardiac meds: ASA, BB, statin   - Pain and anticonstipation meds  -  2v CXR done pending read  -  Ordered postop echo, done today  - Remove epicardial wires prior to discharge   - Tele until discharge  - Optimize nutrition and electrolytes    Rhythm  - Tele: SR with 75 bpm  - Continue BB  - Adjust medications as tolerated    Hypertension: home meds: norvasc 10 mg daily, hydralazne 100 mg TID, lisinopril-hydrochlorothiazide 20-25 mg daily, metoprolol succinate XL 50 daily  Systolic (24hrs), Av , Min:153 , Max:167   - Continue metoprolol 25mg BID   - add lisinopril 5mg today for HTN  - Additional antihypertensives as needed    Hyperlipidemia: home Crestor 20 mg daily, Tricor 145 mg daily  No results found for: \"CHOL\", \"HDL\", \"VLDL\", \"TRIG\", \"NHDL\"   - Continue rosuvastatin 20mg daily  - Follow up lipid panel with PCP/ cardiologist for ongoing lipid management    DM: home meds: Amaryl 2 mg BID; Jardiance 25 mg daily, Metformin 1,000 mg BID  Lab Results   Component Value Date    HGBA1C 6.7 (H) 2024     Results from last 7 days   Lab Units 24  1216 24  0807 24  1626 24  1200 24  0812 24   POCT GLUCOSE mg/dL 155* 192* " 196* 183* 212* 168* 271*   - Accuchecks premeal and at bedtime  - Diabetic diet  - lantus 8units at bedtime   - Lispro premeal corrective scale  - endocrine following appreciate recs    Acute Blood Loss Anemia   Recent Labs     06/24/24  0718 06/23/24  0926 06/22/24  1455 06/22/24  0155 06/21/24  1505 06/21/24  0620 06/19/24  1104   HGB 10.2*  10.2* 10.7*  10.7* 11.0* 11.6* 13.2* 15.2 15.5   HCT 30.8*  30.8* 32.2*  32.2* 31.7* 32.4* 37.3* 43.3 44.8   - MV x1mo  - Daily labs, transfuse as indicated    Thrombocytopenia  Recent Labs     06/24/24  0718 06/23/24  0926 06/22/24  1455 06/22/24  0155 06/21/24  1505 06/21/24  0620 06/19/24  1104   PLT 78*  78* 86*  86* 110* 112* 202 183 179   - Etiology likely postop/CPB related  - Continue to trend with daily CBCs  - hold DVT ppx d/t downtrending PLTS     Hematuria  Hx of urinary retention  - hgb slightly down trending as above   - now with cherry red urine in valle  - hx of urinary retention and hx of having to have catheter replaced  - will start tamsulosin 0.4mg daily today, plan to remove valle on dose 3    Volume/Electrolyte Status: Preop wt Weight: 95.1 kg (209 lb 10.5 oz)   Vitals:    06/24/24 0600   Weight: 98.4 kg (216 lb 14.4 oz)   - Weight: 98.4  - Adjust diuresis as needed for postop cardiac surgery hypervolemia  - Replete electrolytes for hypokalemia/hypomagnesemia/hypophosphatemia as needed  - Daily weights and strict I&Os  - Daily RFP while admitted    VTE Prophylaxis: SCDs/TEDs, ambulation, SQ heparin  Code Status: Full Code    Dispo  - PT/OT recs Low intensity  - Would benefit from homecare for cardiac surgery carepath and RN visits  - Anticipate discharge 2-3 days, pending removal of valle without   - Will continue to assess discharge needs    Frederick Lionetti, APRN-CNP, DNP  Cardiac Surgery [facility]  [facility]  Team Phone [phone]    6/24/2024  2:13 PM

## 2024-06-24 NOTE — PROGRESS NOTES
Occupational Therapy    Communication Note    Missed Visit: Yes  Missed Visit Reason:  (pt is off the unit for testing, will reattempt as schedule permits)      06/24/24 at 2:29 PM   Toshia Gavin, OT   Rehab Office: [phone]

## 2024-06-24 NOTE — PROGRESS NOTES
"Umair Carney is a 74 y.o. male on day 3 of admission presenting with Aortic valve stenosis.    Subjective   Patient is seen and examined this AM.   Reports good oral intake.     Objective   Review of Systems  Physical Exam  Vitals:    06/24/24 0500   BP: (!) 159/92   Pulse: 75   Resp: 18   Temp: 37.2 °C (99 °F)   SpO2: 94%   HEENT: PERRLA, no proptosis   Chest: CTA, GBAE, No Wheezes  Abdo: +BS soft nt no HSM   LE: +pp no edema    Last Recorded Vitals  Blood pressure (!) 159/92, pulse 75, temperature 37.2 °C (99 °F), temperature source Tympanic, resp. rate 18, height 1.854 m (6' 1\"), weight 98.4 kg (216 lb 14.4 oz), SpO2 94%.  Intake/Output last 3 Shifts:  I/O last 3 completed shifts:  In: 240 (2.5 mL/kg) [P.O.:240]  Out: 1710 (18 mL/kg) [Urine:1325 (0.4 mL/kg/hr); Chest Tube:385]  Dosing Weight: 95.1 kg     Relevant Results  Results from last 7 days   Lab Units 06/24/24  0807 06/24/24  0718 06/23/24  2035 06/23/24  1626 06/23/24  1200 06/23/24  0926 06/23/24  0812 06/22/24  1701 06/22/24  1455 06/22/24  0443 06/22/24  0155 06/21/24  1618 06/21/24  1505   POCT GLUCOSE mg/dL 192*  --  196* 183* 212*  --  168*   < >  --    < >  --    < >  --    GLUCOSE mg/dL  --  158*  --   --   --  275*  --   --  197*  --  152*  --  133*    < > = values in this interval not displayed.       Current Facility-Administered Medications:     acetaminophen (Tylenol) tablet 650 mg, 650 mg, oral, q6h, LEVI Torres-CNP, 650 mg at 06/24/24 0236    amLODIPine (Norvasc) tablet 5 mg, 5 mg, oral, Daily, Woody Brewer MD, 5 mg at 06/24/24 0236    aspirin chewable tablet 81 mg, 81 mg, oral, Daily, ARABELLA Torres, 81 mg at 06/24/24 0916    dextrose 50 % injection 25 g, 25 g, intravenous, q15 min PRN **OR** glucagon (Glucagen) injection 1 mg, 1 mg, intramuscular, q15 min PRN, ARABELLA Torres    heparin (porcine) injection 5,000 Units, 5,000 Units, subcutaneous, q8h, ARABELLA Torres, 5,000 Units at 06/24/24 0300    insulin " glargine (Lantus) injection 8 Units, 8 Units, subcutaneous, q24h, Renetta Maloney, APRN-CNP, APRN-CNS, 8 Units at 06/23/24 2048    insulin lispro (HumaLOG) injection 0-5 Units, 0-5 Units, subcutaneous, TID, Effie Cabrera, APRN-CNP, 1 Units at 06/24/24 0919    insulin lispro (HumaLOG) injection 4 Units, 4 Units, subcutaneous, TID, Renetta Maloney, APRN-CNP, APRN-CNS, 4 Units at 06/24/24 0918    metoprolol tartrate (Lopressor) tablet 25 mg, 25 mg, oral, BID, Woody Brewer MD, 25 mg at 06/24/24 0916    multivitamin with minerals 1 tablet, 1 tablet, oral, Daily, Effie Cabrera, APRN-CNP, 1 tablet at 06/24/24 0916    naloxone (Narcan) injection 0.2 mg, 0.2 mg, intravenous, q5 min PRN, Effie Tysoni, APRN-CNP    [DISCONTINUED] ondansetron (Zofran) tablet 4 mg, 4 mg, oral, q8h PRN **OR** ondansetron (Zofran) injection 4 mg, 4 mg, intravenous, q8h PRN, Effie DRUMMOND Rick, APRN-CNP, 4 mg at 06/23/24 1726    oxyCODONE (Roxicodone) immediate release tablet 10 mg, 10 mg, oral, q4h PRN, Chou C Rick, APRN-CNP    oxyCODONE (Roxicodone) immediate release tablet 5 mg, 5 mg, oral, q4h PRN, Chou C Rick, APRN-CNP    oxygen (O2) therapy, , inhalation, Continuous PRN - O2/gases, Effie Tysoni, APRN-CNP    polyethylene glycol (Glycolax, Miralax) packet 17 g, 17 g, oral, BID, Chou C Rick, APRN-CNP, 17 g at 06/23/24 0818    rosuvastatin (Crestor) tablet 20 mg, 20 mg, oral, Nightly, Chou C Rick, APRN-CNP, 20 mg at 06/23/24 2046    sennosides-docusate sodium (Hoa-Colace) 8.6-50 mg per tablet 2 tablet, 2 tablet, oral, BID, Effie Cabrera, LEVI-CNP, 2 tablet at 06/23/24 0817       Assessment/Plan   Principal Problem:    Aortic valve stenosis  Active Problems:    Aortic valve stenosis, etiology of cardiac valve disease unspecified  Umair Carney is a 74 y.o. male with PMHx of HTN, HLD, Prostate cancer s/p radiation in 2016, T2DM - Last A1C 6.7 6/24 who was referred to Dr. Durant for nonrheumatic aortic valve stenosis now s/p AVR and CABGx3 (LIMA-LAD, SVG-CX,  SVG-PDA), LAAC. Endocrine is consulted for inpatient hyperglycemia management.      Hospital course - Insulin drip during CBG     Patient with DM2 Regimen:    Metformin 1000mg BID  Rybelsius   Jardiance 25mg     Denies recent hyperglycemic complications     Patient has been on Novolog in the past - discontinued for at least 3 years.      Managed by Endocrine - Kaye August     Weight 98.4 kg   # Inpatient Diabetes Mellitus Management Type 2 s/p CBG   Initial Endocrine Recommendations -6/23:   Lantus 8 units PM   Lispro 4 units TID WM  SS# 1 - 1 unit for every 50>150 - Lispro      Discharge Recommendations on 6/24/24 - Home Diabetes regimen:     Metformin 1000mg orally BID  Rybelsius - Outpatient follow up consideration for Ozempic in the light of cardioprotection as well and predictability  Jardiance 25mg orally daily   Endocrine pager [phone]  Plan communicated to primary team   Patient discussed with Dr. Rivero who agrees with the management plan.   Staci Ruth MD

## 2024-06-24 NOTE — PROGRESS NOTES
Umair Carney is a 74 y.o. year old male patient who presents for AVR and CABGx3 with Dr. Durant on 6/21/24. Acute Pain consulted for block for postoperative pain control.     Postop Pain HPI -   Palliative: relieved with IV analgesics and regional local anesthetics  Provocative: movement  Quality:  burning and aching  Radiation:  none  Severity:  2/10  Timing: constant    24-HOUR OPIOID CONSUMPTION:  No opioids     Scheduled medications  acetaminophen, 650 mg, oral, q6h  amLODIPine, 5 mg, oral, Daily  aspirin, 81 mg, oral, Daily  heparin (porcine), 5,000 Units, subcutaneous, q8h  insulin glargine, 8 Units, subcutaneous, q24h  insulin lispro, 0-5 Units, subcutaneous, TID  insulin lispro, 4 Units, subcutaneous, TID  metoprolol tartrate, 25 mg, oral, BID  multivitamin with minerals, 1 tablet, oral, Daily  polyethylene glycol, 17 g, oral, BID  rosuvastatin, 20 mg, oral, Nightly  sennosides-docusate sodium, 2 tablet, oral, BID      Continuous medications       PRN medications  PRN medications: dextrose **OR** glucagon, naloxone, [DISCONTINUED] ondansetron **OR** ondansetron, oxyCODONE, oxyCODONE, oxygen     Physical Exam:  Constitutional:  no distress, alert and cooperative  Eyes: clear sclera  Head/Neck: No apparent injury, trachea midline  Respiratory/Thorax: Patent airways, thorax symmetric, breathing comfortably  Cardiovascular: no pitting edema  Gastrointestinal: Nondistended  Musculoskeletal: ROM intact  Extremities: no clubbing  Neurological: alert, alvarez x4  Psychological: Appropriate affect    Results for orders placed or performed during the hospital encounter of 06/21/24 (from the past 24 hour(s))   POCT GLUCOSE   Result Value Ref Range    POCT Glucose 212 (H) 74 - 99 mg/dL   POCT GLUCOSE   Result Value Ref Range    POCT Glucose 183 (H) 74 - 99 mg/dL   POCT GLUCOSE   Result Value Ref Range    POCT Glucose 196 (H) 74 - 99 mg/dL   CBC   Result Value Ref Range    WBC 9.6 4.4 - 11.3 x10*3/uL    nRBC 0.0 0.0 - 0.0  /100 WBCs    RBC 3.57 (L) 4.50 - 5.90 x10*6/uL    Hemoglobin 10.2 (L) 13.5 - 17.5 g/dL    Hematocrit 30.8 (L) 41.0 - 52.0 %    MCV 86 80 - 100 fL    MCH 28.6 26.0 - 34.0 pg    MCHC 33.1 32.0 - 36.0 g/dL    RDW 14.2 11.5 - 14.5 %    Platelets 78 (L) 150 - 450 x10*3/uL   Magnesium   Result Value Ref Range    Magnesium 2.38 1.60 - 2.40 mg/dL   Renal Function Panel   Result Value Ref Range    Glucose 158 (H) 74 - 99 mg/dL    Sodium 139 136 - 145 mmol/L    Potassium 3.6 3.5 - 5.3 mmol/L    Chloride 106 98 - 107 mmol/L    Bicarbonate 23 21 - 32 mmol/L    Anion Gap 14 10 - 20 mmol/L    Urea Nitrogen 35 (H) 6 - 23 mg/dL    Creatinine 1.56 (H) 0.50 - 1.30 mg/dL    eGFR 47 (L) >60 mL/min/1.73m*2    Calcium 8.3 (L) 8.6 - 10.6 mg/dL    Phosphorus 2.5 2.5 - 4.9 mg/dL    Albumin 3.2 (L) 3.4 - 5.0 g/dL   Protime-INR   Result Value Ref Range    Protime 13.1 (H) 9.8 - 12.8 seconds    INR 1.2 (H) 0.9 - 1.1   Hepatic function panel   Result Value Ref Range    Albumin 3.2 (L) 3.4 - 5.0 g/dL    Bilirubin, Total 0.4 0.0 - 1.2 mg/dL    Bilirubin, Direct 0.1 0.0 - 0.3 mg/dL    Alkaline Phosphatase 48 33 - 136 U/L    ALT 4 (L) 10 - 52 U/L    AST 15 9 - 39 U/L    Total Protein 5.7 (L) 6.4 - 8.2 g/dL   POCT GLUCOSE   Result Value Ref Range    POCT Glucose 192 (H) 74 - 99 mg/dL            Past Medical History:   Diagnosis Date    Coronary artery disease     Hyperlipidemia     Hypertension     Prostate cancer (Multi)     s/p radiation    Skin cancer     removed    Type 2 diabetes mellitus (Multi)         Past Surgical History:   Procedure Laterality Date    CARDIAC CATHETERIZATION      CATARACT EXTRACTION Bilateral     COLONOSCOPY      KNEE ARTHROPLASTY Bilateral 2016        Family History   Problem Relation Name Age of Onset    Heart disease Mother      Prostate cancer Father          Social History     Socioeconomic History    Marital status:      Spouse name: Not on file    Number of children: Not on file    Years of education: Not on  file    Highest education level: Not on file   Occupational History    Not on file   Tobacco Use    Smoking status: Never    Smokeless tobacco: Never   Vaping Use    Vaping status: Never Used   Substance and Sexual Activity    Alcohol use: Not Currently     Comment: approximately one per week    Drug use: Never    Sexual activity: Defer   Other Topics Concern    Not on file   Social History Narrative    Not on file     Social Determinants of Health     Financial Resource Strain: Low Risk  (6/23/2024)    Overall Financial Resource Strain (CARDIA)     Difficulty of Paying Living Expenses: Not hard at all   Food Insecurity: Not on file   Transportation Needs: No Transportation Needs (6/23/2024)    PRAPARE - Transportation     Lack of Transportation (Medical): No     Lack of Transportation (Non-Medical): No   Physical Activity: Not on file   Stress: Not on file   Social Connections: Not on file   Intimate Partner Violence: Not on file   Housing Stability: Low Risk  (6/23/2024)    Housing Stability Vital Sign     Unable to Pay for Housing in the Last Year: No     Number of Places Lived in the Last Year: 1     Unstable Housing in the Last Year: No        No Known Allergies      Review of Systems  Gen: No fatigue, anorexia, insomnia, fever.   Eyes: No vision loss, double vision, drainage, eye pain.   ENT: No pharyngitis, dry mouth, no hearing changes or ear discharge  Cardiac: No chest pain, palpitations, syncope, near syncope.   Pulmonary: No shortness of breath, cough, hemoptysis.   Heme/lymph: No swollen glands, fever, bleeding.   GI: No abdominal pain, change in bowel habits, melena, hematemesis, hematochezia, nausea, vomiting, diarrhea.   : No discharge, dysuria, frequency, urgency, hematuria.  Endo: No polyuria or weight loss.   Musculoskeletal: Negative for any pain or loss of ROM/weakness  Skin: No rashes or lesions  Neuro: Normal speech, no numbness or weakness. No gait difficulties  Review of systems is  otherwise negative unless stated above or in history of present illness.    Physical Exam:  Constitutional:  no distress, alert and cooperative  Eyes: clear sclera  Head/Neck: No apparent injury, trachea midline  Respiratory/Thorax: Patent airways, thorax symmetric, breathing comfortably  Cardiovascular: no pitting edema  Gastrointestinal: Nondistended  Musculoskeletal: ROM intact  Extremities: no clubbing  Neurological: alert, alvarez x4  Psychological: Appropriate affect    Results for orders placed or performed during the hospital encounter of 06/21/24 (from the past 24 hour(s))   POCT GLUCOSE   Result Value Ref Range    POCT Glucose 212 (H) 74 - 99 mg/dL   POCT GLUCOSE   Result Value Ref Range    POCT Glucose 183 (H) 74 - 99 mg/dL   POCT GLUCOSE   Result Value Ref Range    POCT Glucose 196 (H) 74 - 99 mg/dL   CBC   Result Value Ref Range    WBC 9.6 4.4 - 11.3 x10*3/uL    nRBC 0.0 0.0 - 0.0 /100 WBCs    RBC 3.57 (L) 4.50 - 5.90 x10*6/uL    Hemoglobin 10.2 (L) 13.5 - 17.5 g/dL    Hematocrit 30.8 (L) 41.0 - 52.0 %    MCV 86 80 - 100 fL    MCH 28.6 26.0 - 34.0 pg    MCHC 33.1 32.0 - 36.0 g/dL    RDW 14.2 11.5 - 14.5 %    Platelets 78 (L) 150 - 450 x10*3/uL   Magnesium   Result Value Ref Range    Magnesium 2.38 1.60 - 2.40 mg/dL   Renal Function Panel   Result Value Ref Range    Glucose 158 (H) 74 - 99 mg/dL    Sodium 139 136 - 145 mmol/L    Potassium 3.6 3.5 - 5.3 mmol/L    Chloride 106 98 - 107 mmol/L    Bicarbonate 23 21 - 32 mmol/L    Anion Gap 14 10 - 20 mmol/L    Urea Nitrogen 35 (H) 6 - 23 mg/dL    Creatinine 1.56 (H) 0.50 - 1.30 mg/dL    eGFR 47 (L) >60 mL/min/1.73m*2    Calcium 8.3 (L) 8.6 - 10.6 mg/dL    Phosphorus 2.5 2.5 - 4.9 mg/dL    Albumin 3.2 (L) 3.4 - 5.0 g/dL   Protime-INR   Result Value Ref Range    Protime 13.1 (H) 9.8 - 12.8 seconds    INR 1.2 (H) 0.9 - 1.1   Hepatic function panel   Result Value Ref Range    Albumin 3.2 (L) 3.4 - 5.0 g/dL    Bilirubin, Total 0.4 0.0 - 1.2 mg/dL    Bilirubin, Direct  0.1 0.0 - 0.3 mg/dL    Alkaline Phosphatase 48 33 - 136 U/L    ALT 4 (L) 10 - 52 U/L    AST 15 9 - 39 U/L    Total Protein 5.7 (L) 6.4 - 8.2 g/dL   POCT GLUCOSE   Result Value Ref Range    POCT Glucose 192 (H) 74 - 99 mg/dL        Umair Carney is a 74 y.o. year old male patient who presents for AVR and CABGx3 with Dr. Durant on 6/21/24. Acute Pain consulted for block for postoperative pain control.   Plan:    - Bilateral SAP blocks with catheters as well as bilateral subcostal TAP blocks performed postoperatively in the OR on 6/21/24  - b/l catheter removed with intact tips   - Continue Ambit pump with Ropivacaine 0.2%/NaCl 0.9% 500mL, Rate 7 cc/hr bilaterally- Dced   - Ambit medication will not interfere with pain medication prescribed by the primary team.   - Please be aware of local anesthetic toxic dose and absorption variability before considering lidocaine patches  - Acute pain service will Sign off   - Rest of pain management per primary team    Acute Pain Resident  pg [phone] ph [phone]

## 2024-06-24 NOTE — CARE PLAN
The patient's goals for the shift include      The clinical goals for the shift include        Problem: Skin  Goal: Decreased wound size/increased tissue granulation at next dressing change  Outcome: Progressing  Goal: Participates in plan/prevention/treatment measures  Outcome: Progressing  Goal: Prevent/manage excess moisture  Outcome: Progressing  Goal: Prevent/minimize sheer/friction injuries  Outcome: Progressing  Goal: Promote/optimize nutrition  Outcome: Progressing  Goal: Promote skin healing  Outcome: Progressing     Problem: Fall/Injury  Goal: Not fall by end of shift  Outcome: Progressing  Goal: Be free from injury by end of the shift  Outcome: Progressing  Goal: Verbalize understanding of personal risk factors for fall in the hospital  Outcome: Progressing  Goal: Verbalize understanding of risk factor reduction measures to prevent injury from fall in the home  Outcome: Progressing  Goal: Use assistive devices by end of the shift  Outcome: Progressing  Goal: Pace activities to prevent fatigue by end of the shift  Outcome: Progressing

## 2024-06-24 NOTE — PROGRESS NOTES
Met with patient and his wife and introduced myself as Care Coordinator and member of the discharge planning team. Patient is s/p CABG x3. He plans to return home at time of discharge with his wife. Discussed home care needs and options. A list of area agencies was given to pt's wife. They are agreeable to referrals being sent in [facility]. Care Coordinator will continue to follow for home going needs.  Bernadine Short RN

## 2024-06-25 ENCOUNTER — APPOINTMENT (OUTPATIENT)
Dept: RADIOLOGY | Facility: HOSPITAL | Age: 74
DRG: 220 | End: 2024-06-25
Payer: MEDICARE

## 2024-06-25 LAB
ALBUMIN SERPL BCP-MCNC: 3.1 G/DL (ref 3.4–5)
ALBUMIN SERPL BCP-MCNC: 3.1 G/DL (ref 3.4–5)
ALP SERPL-CCNC: 58 U/L (ref 33–136)
ALT SERPL W P-5'-P-CCNC: 12 U/L (ref 10–52)
ANION GAP SERPL CALC-SCNC: 14 MMOL/L (ref 10–20)
AST SERPL W P-5'-P-CCNC: 24 U/L (ref 9–39)
BASOPHILS # BLD AUTO: 0.02 X10*3/UL (ref 0–0.1)
BASOPHILS NFR BLD AUTO: 0.3 %
BILIRUB DIRECT SERPL-MCNC: 0.2 MG/DL (ref 0–0.3)
BILIRUB SERPL-MCNC: 0.5 MG/DL (ref 0–1.2)
BUN SERPL-MCNC: 31 MG/DL (ref 6–23)
CALCIUM SERPL-MCNC: 8.2 MG/DL (ref 8.6–10.6)
CHLORIDE SERPL-SCNC: 107 MMOL/L (ref 98–107)
CO2 SERPL-SCNC: 22 MMOL/L (ref 21–32)
CREAT SERPL-MCNC: 1.42 MG/DL (ref 0.5–1.3)
EGFRCR SERPLBLD CKD-EPI 2021: 52 ML/MIN/1.73M*2
EOSINOPHIL # BLD AUTO: 0.12 X10*3/UL (ref 0–0.4)
EOSINOPHIL NFR BLD AUTO: 1.5 %
ERYTHROCYTE [DISTWIDTH] IN BLOOD BY AUTOMATED COUNT: 13.9 % (ref 11.5–14.5)
ERYTHROCYTE [DISTWIDTH] IN BLOOD BY AUTOMATED COUNT: 13.9 % (ref 11.5–14.5)
GLUCOSE BLD MANUAL STRIP-MCNC: 132 MG/DL (ref 74–99)
GLUCOSE BLD MANUAL STRIP-MCNC: 148 MG/DL (ref 74–99)
GLUCOSE BLD MANUAL STRIP-MCNC: 151 MG/DL (ref 74–99)
GLUCOSE BLD MANUAL STRIP-MCNC: 205 MG/DL (ref 74–99)
GLUCOSE SERPL-MCNC: 153 MG/DL (ref 74–99)
HCT VFR BLD AUTO: 32.3 % (ref 41–52)
HCT VFR BLD AUTO: 32.3 % (ref 41–52)
HGB BLD-MCNC: 10.5 G/DL (ref 13.5–17.5)
HGB BLD-MCNC: 10.5 G/DL (ref 13.5–17.5)
IMM GRANULOCYTES # BLD AUTO: 0.13 X10*3/UL (ref 0–0.5)
IMM GRANULOCYTES NFR BLD AUTO: 1.7 % (ref 0–0.9)
INR PPP: 1.2 (ref 0.9–1.1)
LYMPHOCYTES # BLD AUTO: 0.81 X10*3/UL (ref 0.8–3)
LYMPHOCYTES NFR BLD AUTO: 10.4 %
MAGNESIUM SERPL-MCNC: 2.3 MG/DL (ref 1.6–2.4)
MCH RBC QN AUTO: 28.4 PG (ref 26–34)
MCH RBC QN AUTO: 28.4 PG (ref 26–34)
MCHC RBC AUTO-ENTMCNC: 32.5 G/DL (ref 32–36)
MCHC RBC AUTO-ENTMCNC: 32.5 G/DL (ref 32–36)
MCV RBC AUTO: 87 FL (ref 80–100)
MCV RBC AUTO: 87 FL (ref 80–100)
MONOCYTES # BLD AUTO: 0.59 X10*3/UL (ref 0.05–0.8)
MONOCYTES NFR BLD AUTO: 7.6 %
NEUTROPHILS # BLD AUTO: 6.1 X10*3/UL (ref 1.6–5.5)
NEUTROPHILS NFR BLD AUTO: 78.5 %
NRBC BLD-RTO: 0 /100 WBCS (ref 0–0)
NRBC BLD-RTO: 0 /100 WBCS (ref 0–0)
PHOSPHATE SERPL-MCNC: 2.4 MG/DL (ref 2.5–4.9)
PLATELET # BLD AUTO: 99 X10*3/UL (ref 150–450)
PLATELET # BLD AUTO: 99 X10*3/UL (ref 150–450)
POTASSIUM SERPL-SCNC: 3.8 MMOL/L (ref 3.5–5.3)
PROT SERPL-MCNC: 5.6 G/DL (ref 6.4–8.2)
PROTHROMBIN TIME: 13.4 SECONDS (ref 9.8–12.8)
RBC # BLD AUTO: 3.7 X10*6/UL (ref 4.5–5.9)
RBC # BLD AUTO: 3.7 X10*6/UL (ref 4.5–5.9)
SODIUM SERPL-SCNC: 139 MMOL/L (ref 136–145)
WBC # BLD AUTO: 7.9 X10*3/UL (ref 4.4–11.3)
WBC # BLD AUTO: 7.9 X10*3/UL (ref 4.4–11.3)

## 2024-06-25 PROCEDURE — 2500000001 HC RX 250 WO HCPCS SELF ADMINISTERED DRUGS (ALT 637 FOR MEDICARE OP)

## 2024-06-25 PROCEDURE — 80053 COMPREHEN METABOLIC PANEL: CPT

## 2024-06-25 PROCEDURE — 2500000001 HC RX 250 WO HCPCS SELF ADMINISTERED DRUGS (ALT 637 FOR MEDICARE OP): Performed by: NURSE PRACTITIONER

## 2024-06-25 PROCEDURE — 1200000002 HC GENERAL ROOM WITH TELEMETRY DAILY

## 2024-06-25 PROCEDURE — 2500000002 HC RX 250 W HCPCS SELF ADMINISTERED DRUGS (ALT 637 FOR MEDICARE OP, ALT 636 FOR OP/ED): Performed by: NURSE PRACTITIONER

## 2024-06-25 PROCEDURE — 84100 ASSAY OF PHOSPHORUS: CPT

## 2024-06-25 PROCEDURE — 2500000001 HC RX 250 WO HCPCS SELF ADMINISTERED DRUGS (ALT 637 FOR MEDICARE OP): Performed by: STUDENT IN AN ORGANIZED HEALTH CARE EDUCATION/TRAINING PROGRAM

## 2024-06-25 PROCEDURE — 2500000004 HC RX 250 GENERAL PHARMACY W/ HCPCS (ALT 636 FOR OP/ED): Performed by: NURSE PRACTITIONER

## 2024-06-25 PROCEDURE — 85025 COMPLETE CBC W/AUTO DIFF WBC: CPT

## 2024-06-25 PROCEDURE — 83735 ASSAY OF MAGNESIUM: CPT

## 2024-06-25 PROCEDURE — 85610 PROTHROMBIN TIME: CPT

## 2024-06-25 PROCEDURE — 71045 X-RAY EXAM CHEST 1 VIEW: CPT

## 2024-06-25 PROCEDURE — 85027 COMPLETE CBC AUTOMATED: CPT

## 2024-06-25 PROCEDURE — 82248 BILIRUBIN DIRECT: CPT | Performed by: THORACIC SURGERY (CARDIOTHORACIC VASCULAR SURGERY)

## 2024-06-25 PROCEDURE — 36415 COLL VENOUS BLD VENIPUNCTURE: CPT

## 2024-06-25 PROCEDURE — 71045 X-RAY EXAM CHEST 1 VIEW: CPT | Performed by: RADIOLOGY

## 2024-06-25 PROCEDURE — 2500000002 HC RX 250 W HCPCS SELF ADMINISTERED DRUGS (ALT 637 FOR MEDICARE OP, ALT 636 FOR OP/ED)

## 2024-06-25 PROCEDURE — 97166 OT EVAL MOD COMPLEX 45 MIN: CPT | Mod: GO

## 2024-06-25 PROCEDURE — 82947 ASSAY GLUCOSE BLOOD QUANT: CPT | Mod: 91

## 2024-06-25 RX ORDER — METFORMIN HYDROCHLORIDE 1000 MG/1
1000 TABLET ORAL
Status: DISCONTINUED | OUTPATIENT
Start: 2024-06-25 | End: 2024-06-27 | Stop reason: HOSPADM

## 2024-06-25 RX ORDER — AMLODIPINE BESYLATE 10 MG/1
10 TABLET ORAL DAILY
Status: DISCONTINUED | OUTPATIENT
Start: 2024-06-25 | End: 2024-06-27 | Stop reason: HOSPADM

## 2024-06-25 RX ORDER — POTASSIUM CHLORIDE 20 MEQ/1
40 TABLET, EXTENDED RELEASE ORAL ONCE
Status: COMPLETED | OUTPATIENT
Start: 2024-06-25 | End: 2024-06-25

## 2024-06-25 RX ORDER — ROSUVASTATIN CALCIUM 40 MG/1
40 TABLET, COATED ORAL NIGHTLY
Status: DISCONTINUED | OUTPATIENT
Start: 2024-06-25 | End: 2024-06-27 | Stop reason: HOSPADM

## 2024-06-25 RX ORDER — METFORMIN HYDROCHLORIDE 500 MG/1
500 TABLET ORAL
Status: DISCONTINUED | OUTPATIENT
Start: 2024-06-25 | End: 2024-06-25

## 2024-06-25 RX ORDER — FUROSEMIDE 10 MG/ML
40 INJECTION INTRAMUSCULAR; INTRAVENOUS ONCE
Status: DISCONTINUED | OUTPATIENT
Start: 2024-06-25 | End: 2024-06-25

## 2024-06-25 RX ORDER — ASPIRIN 81 MG/1
81 TABLET ORAL DAILY
Status: DISCONTINUED | OUTPATIENT
Start: 2024-06-26 | End: 2024-06-27 | Stop reason: HOSPADM

## 2024-06-25 ASSESSMENT — COGNITIVE AND FUNCTIONAL STATUS - GENERAL
WALKING IN HOSPITAL ROOM: A LITTLE
PERSONAL GROOMING: A LITTLE
TOILETING: A LITTLE
DRESSING REGULAR UPPER BODY CLOTHING: A LITTLE
STANDING UP FROM CHAIR USING ARMS: A LITTLE
TOILETING: A LITTLE
DRESSING REGULAR LOWER BODY CLOTHING: A LITTLE
PERSONAL GROOMING: A LITTLE
HELP NEEDED FOR BATHING: A LITTLE
MOBILITY SCORE: 18
DRESSING REGULAR LOWER BODY CLOTHING: A LITTLE
CLIMB 3 TO 5 STEPS WITH RAILING: A LOT
DAILY ACTIVITIY SCORE: 19
DAILY ACTIVITIY SCORE: 19
TURNING FROM BACK TO SIDE WHILE IN FLAT BAD: A LITTLE
MOVING TO AND FROM BED TO CHAIR: A LITTLE
HELP NEEDED FOR BATHING: A LITTLE
DRESSING REGULAR UPPER BODY CLOTHING: A LITTLE

## 2024-06-25 ASSESSMENT — PAIN SCALES - GENERAL
PAINLEVEL_OUTOF10: 0 - NO PAIN
PAINLEVEL_OUTOF10: 2
PAINLEVEL_OUTOF10: 0 - NO PAIN

## 2024-06-25 ASSESSMENT — ACTIVITIES OF DAILY LIVING (ADL)
ADL_ASSISTANCE: INDEPENDENT
BATHING_ASSISTANCE: MINIMAL

## 2024-06-25 ASSESSMENT — PAIN - FUNCTIONAL ASSESSMENT
PAIN_FUNCTIONAL_ASSESSMENT: 0-10

## 2024-06-25 NOTE — DISCHARGE INSTRUCTIONS
Don't forget to “Keep Your Move in the Tube!!”     Please refer to the “Move in the Tube” handout.  -- Load bearing activities can be completed if you are “staying in the tube.” If you are attempting load bearing activities, let pain be your guide with when trying an activity “out of the tube”. If an activity hurts or is uncomfortable go back to doing it while you stay “in the tube”. There is no time limit to “stay in the tube”.     -- Non-load bearing activities, which are your activities of daily living, can be completed with your arms “out of the tube” as long as you remain pain free. Some activities of daily living examples are dressing, personal care, showering, washing hair, and toilet hygiene.     Don't forget to KEEP YOUR MOVE IN THE TUBE and think of a SOUMYA Mayes dinosaur!                                       **IMPORTANT**  Prevention of Infective Endocarditis (Heart Infections) After Cardiac Surgery:    Infective endocarditis occurs when bacteria enters the bloodstream and then attaches to the heart. Patients with a new heart valve, repaired heart valve, or graft used to repair/replace their aorta are at higher risk for developing this because of the prosthetic (man-made) medical material in their heart. Endocarditis is rare but when you undergo certain medical or dental procedures, as listed below, it can give bacteria an opportunity to enter the blood and cause this type of infection. To prevent this, preventative antibiotics taken before these procedures are required.     Antibiotics are always required PRIOR to the following:  - Dental work with gingival, periapical, or other gingival perforation (such as tooth extractions, dental abscess drainage, and dental cleanings)  - Respiratory procedures with incisions or biopsies   - Cardiac or vascular procedures to place or replace prosthetic material (such as heart valves, stents, etc.)    Antibiotics are required in the following situations ONLY if an  infection is already present:  - Skin or soft tissue procedures with infected tissue  - Gastrointestinal procedures with GI infections  - Urinary or genital procedure with acute genitourinary infections    Antibiotic examples you should expect to be prescribed:  - Amoxicillin or Ampicillin are usually the  first choice  - Cephalexin, Clindamycin, or Vancomycin may be used if you are unable to tolerate/allergic to Amoxicillin or Ampicillin  - Amoxicillin or Ampicillin (if allergic, use Vancomycin) will cover for enterococcus if you have a known acute biliary tract infection   - Specific antibiotics for a known organism should be used when treating an active infection    Please notify your dentist/primary physician/cardiologist PRIOR to these types of procedures to obtain the proper antibiotics and prevent a serious infection in your heart. When in doubt, always ask!   Additionally, good oral hygiene (routine brushing, flossing, and dental care) and prompt treatment of other infections (such as UTIs and skin infections) are equally as important to prevent endocarditis!!    Additional Post-Operative Instructions:  - Remember to use your Incentive spirometer 10x/hr while awake. Remember to cough and deep breath.  - No NSAIDs (common over-the-counter NSAIDs are ibuprofen/Motrin/Advil, naproxen/Naprosyn/Aleve) for 3 months after cardiac surgery; if NSAIDs needed after 3 months, clear use with cardiologist before starting.       Your home medications may have changed after surgery. Carefully compare this list with your prescription bottles at home and set aside any medications you are told to not take so you do not confuse them. Do not dispose of any medications until your follow-up, since your doctors may restart some at your follow-up appointments. It is important to bring a complete, current list of your medications to any medical appointments or hospitalizations.    For any questions about your discharge, please call  the cardiac surgery office at [phone]         Follow Up Appointment with Urology:   MD: Dr. Yasmany Sosa   Date/Time: Monday July 8, 2024 1:00pm   Location:   [facility] [address]   [place], [place]    Dunn Catheter Discharge Instructions:   - Leg Bag; empty as needed   - Keep area clean and dry (wash with unscented, mild antibacterial soap and warm water)

## 2024-06-25 NOTE — CARE PLAN
The patient's goals for the shift include  rest    The clinical goals for the shift include patient will remain hemodynamically stable throughout shift      Problem: Skin  Goal: Decreased wound size/increased tissue granulation at next dressing change  Outcome: Progressing  Goal: Participates in plan/prevention/treatment measures  Outcome: Progressing  Goal: Prevent/manage excess moisture  Outcome: Progressing  Goal: Prevent/minimize sheer/friction injuries  Outcome: Progressing  Goal: Promote/optimize nutrition  Outcome: Progressing  Goal: Promote skin healing  Outcome: Progressing     Problem: Fall/Injury  Goal: Not fall by end of shift  Outcome: Progressing  Goal: Be free from injury by end of the shift  Outcome: Progressing  Goal: Verbalize understanding of personal risk factors for fall in the hospital  Outcome: Progressing  Goal: Verbalize understanding of risk factor reduction measures to prevent injury from fall in the home  Outcome: Progressing  Goal: Use assistive devices by end of the shift  Outcome: Progressing  Goal: Pace activities to prevent fatigue by end of the shift  Outcome: Progressing

## 2024-06-25 NOTE — OP NOTE
Operation Note    Date of the Operation: 06/21/2024  Name: Umair Carney is a 74 y.o. year old male patient with severe aortic valve stenosis and multiple vessel coronary disease  referred for aortic valve replacement and revascularization .   MRN: [id number]    Preoperative Diagnosis:   #1 severe aortic valve stenosis  #2 severe multiple vessel coronary disease  Postoperative Diagnosis:   The same  Operation Procedures:  #1 standard median sternotomy  #2 aortic valve replacement: Perceval extra-large  #3 CABG x 3: LIMA-LAD; SVG-PDA; SVG-PL CX  #4 left atrial appendage closure: 35 mm atrial cure clip  Surgeon: Usman Durant MD    Assistants: Ashok THOMAS    Anesthesia Type: General endotracheal anesthesia    Complications: None    Estimated Blood loss: 200 cc    Indication for Surgery: This is a 74 years old male patient who was diagnosed with severe aortic valve stenosis.  He was discussed in our structural valve meeting, and because of the preoperative coronary angiogram showing multiple vessel coronary artery disease, on the patient who was considered low surgical risk we decided to offer him open heart surgery for revascularization and aortic valve replacement.  The patient and his family agreed to proceed and the consent was signed.    Operative Findings:    IDANIA: Normal LV function, severe and highly calcified aortic valve stenosis, no other significant findings.    Direct: The mediastinum was normal, the pericardium was normal, the pericardial fluid was normal, the ascending aorta is normal size and soft.  The aortic valve is severely calcified and is trileaflet.  The mammary is a good conduit, the vein is a good conduit, the targets are good for bypass.    Operation Description: The patient was taken to the operating room in a stable condition and he was put on the table in a supine position.  After monitoring, general endotracheal anesthesia was induced and the patient was prepped and draped as usual.   Standard median sternotomy.  We proceeded to harvest the left mammary artery in an skeletonized manner and at the same time a piece of saphenous vein was harvested endoscopically from the right leg.  After the conduits were ready and prepared we continued with the dissection of the mediastinum, the pericardium was opened and tacked to the skin and the heparin was given.  Standard central cannulation.  Antegrade and retrograde cardioplegia cannula were placed and the aorta was crossclamped.  1 dose of antegrade cardioplegia and the heart was totally arrested, the heart was maintained arrested for the rest of the cross-clamp time with intermittent dose of retrograde cardioplegia and through the bypasses.  We started with the revascularization part of the operation, we found the PDA, a piece of inverted saphenous vein was anastomosed distally with the coronary artery with a 7-0 running prolene suture.  In the same fashion then we put another 6 inverted piece of saphenous vein into the left circumflex artery.  Finally we brought the mammary as a pedicle and we anastomose the mammary to the LAD with a 7-0 running prolene suture.  The left atrial appendage was exposed and a 35 mm AtriCure clip was applied.  Now the aorta was open, with a classic high transverse aortotomy.  The aortic valve was exposed with the previous findings confirmed.  We proceeded to a complete resection of the valve and decalcification of the annulus.  The annulus was measured and an extra-large Perceval valve was brought and prepared on the back table.  Three 4-0 Prolene sutures were placed into the nadirs of every coronary sinus.  The sutures were passed through the eyelets of the valve, the valve was lowered down and deployed with no inconvenient.  We used a balloon for post implantation dilatation at 4 emely during 35 seconds while we are pouring warm saline.  The valve looks well-seated so we retrieved via guiding sutures.  We proceeded then to  close the aortotomy with a running 4-0 plain suture.  Finally we brought the proximal and of both veins to the circumflex and the PDA and they were anastomosed to the aorta in the same fashion with a running 6-0 Prolene suture.  We proceeded to the area of the left ventricle, the cross-clamp was removed, the heart promptly reassume slow sinus rhythm.  We placed 4 temporary pacemaker wires and we paced at 80/min.  After 10 minutes of reperfusion and rewarming we were able to come off bypass at the first attempt with no inconvenient.  The echo was performed showing a well working valve with no insufficiency and normal gradients.  The LV function is almost normal.  The patient is a stable so the protamine is giving and the patient is decannulated.  2 chest tube were placed into the mediastinal cavity and 1 chest tube into the left pleural cavity.  The hemostasis is achieved, all the counts are correct by the circulating nurse so we continue with the closure of the sternum and the wound in the standard fashion.  The patient is hemodynamically stable so it transferred to the CTICU to continue with his postoperative management.  All the specimens were sent for pathology.  Dictated by Dr. Usman Durant.

## 2024-06-25 NOTE — PROGRESS NOTES
"CARDIAC SURGERY DAILY PROGRESS NOTE    Umair Carney is a 74 y.o. male, with a PMH of HTN, HLD, Prostate cancer s/p radiation in 2016, T2DM who was referred for nonrheumatic aortic valve stenosis.    OPERATION/PROCEDURE: s/p AVR and CABGx3 (LIMA-LAD, SVG-CX, SVG-PDA), LAAC with Usman Durant MD    CTICU Course: Uneventful    Transferred to T3 on 6/22/24    ==================================    Interval History:   No events overnight.     SUBJECTIVE:  - sitting up in chair  - 2view CXR and echo today  - now with some hematuria, cherry red, hx of prostate CA s/p radiation  - hx of urinary retention with valle needing to be replaced when he had knee surgery  - will start tamsulosin 0.4mg daily    - Acute pain removed b/l catheter removed with intact tips   - start lisinopril 5mg daily today     Objective   /83   Pulse 70   Temp 36.8 °C (98.2 °F)   Resp 17   Ht 1.854 m (6' 1\")   Wt 97.8 kg (215 lb 9.6 oz)   SpO2 97%   BMI 28.44 kg/m²   0-10 (Numeric) Pain Score: 0 - No pain   3 Day Weight Change: Unable to Calculate    Intake and Output    Intake/Output Summary (Last 24 hours) at 6/25/2024 1734  Last data filed at 6/25/2024 1511  Gross per 24 hour   Intake 600 ml   Output 2284 ml   Net -1684 ml     Physical Exam  Vitals and nursing note reviewed.   Constitutional:       Appearance: Normal appearance.      Comments: Patient pleasant and wife at bedside.   Epidural catheters per acute pain service. Acute pain service plans to remove tomorrow (6/24).    HENT:      Head: Normocephalic.      Nose: Nose normal.      Mouth/Throat:      Mouth: Mucous membranes are moist.   Cardiovascular:      Rate and Rhythm: Normal rate and regular rhythm.      Pulses: Normal pulses.      Heart sounds: Normal heart sounds.      Comments: Tele: SR, rate 75 bpm  Pacing Wires to pacer with backup rate of 50, mA 10  Two mediastinal Cts y'd together  Pulmonary:      Effort: Pulmonary effort is normal.      Breath sounds: Normal " March 11, 2019      Jarad ROLON Yan[place]  [address] [facility] [id number]        Dear ,    We are writing to inform you of your test results.    Your test results fall within the expected range(s) or remain unchanged from previous results.  Please continue with current treatment plan.    Resulted Orders   Basic Metabolic Panel   Result Value Ref Range    Sodium 136 134 - 144 mmol/L    Potassium 4.0 3.5 - 5.1 mmol/L    Chloride 102 98 - 107 mmol/L    Carbon Dioxide 25 21 - 31 mmol/L    Anion Gap 9 3 - 14 mmol/L    Glucose 107 (H) 70 - 105 mg/dL    Urea Nitrogen 18 7 - 25 mg/dL    Creatinine 0.81 0.70 - 1.30 mg/dL    GFR Estimate >90 >60 mL/min/[1.73_m2]    GFR Estimate If Black >90 >60 mL/min/[1.73_m2]    Calcium 9.5 8.6 - 10.3 mg/dL       If you have any questions or concerns, please call the clinic at the number listed above.       Sincerely,        Neal Enriquez MD                 breath sounds.      Comments: IS: 2L  Abdominal:      General: Bowel sounds are normal.      Palpations: Abdomen is soft.      Comments: (+) BM 6/23  Appetite good.    Genitourinary:     Comments: Dunn catheter in place and draining well, now cherry red hematuria   Musculoskeletal:         General: Normal range of motion.      Cervical back: Neck supple.      Comments: Strong U/L ext movement.   Skin:     General: Skin is warm and dry.      Capillary Refill: Capillary refill takes less than 2 seconds.      Comments: midsternal chest incision C/D/I, well approximated, no s/s infection, right SVG incision C/D/I, well approximated, no s/s infection, pravena dressing intact to midsternal incision     Neurological:      General: No focal deficit present.      Mental Status: He is alert and oriented to person, place, and time.   Psychiatric:         Mood and Affect: Mood normal.         Behavior: Behavior normal.         Medications  Scheduled medications  acetaminophen, 650 mg, oral, q6h  amLODIPine, 10 mg, oral, Daily  aspirin, 81 mg, oral, Daily  [Held by provider] heparin (porcine), 5,000 Units, subcutaneous, q8h  insulin glargine, 8 Units, subcutaneous, q24h  insulin lispro, 0-5 Units, subcutaneous, TID  insulin lispro, 4 Units, subcutaneous, TID  lisinopril, 5 mg, oral, Daily  metoprolol tartrate, 25 mg, oral, BID  multivitamin with minerals, 1 tablet, oral, Daily  polyethylene glycol, 17 g, oral, BID  potassium chloride CR, 40 mEq, oral, Once  rosuvastatin, 20 mg, oral, Nightly  sennosides-docusate sodium, 2 tablet, oral, BID  tamsulosin, 0.4 mg, oral, Daily    Continuous medications   PRN medications  PRN medications: dextrose **OR** glucagon, naloxone, [DISCONTINUED] ondansetron **OR** ondansetron, oxyCODONE, oxyCODONE, oxygen    Labs  Results for orders placed or performed during the hospital encounter of 06/21/24 (from the past 24 hour(s))   POCT GLUCOSE   Result Value Ref Range    POCT Glucose 174 (H) 74 - 99  mg/dL   CBC   Result Value Ref Range    WBC 7.9 4.4 - 11.3 x10*3/uL    nRBC 0.0 0.0 - 0.0 /100 WBCs    RBC 3.70 (L) 4.50 - 5.90 x10*6/uL    Hemoglobin 10.5 (L) 13.5 - 17.5 g/dL    Hematocrit 32.3 (L) 41.0 - 52.0 %    MCV 87 80 - 100 fL    MCH 28.4 26.0 - 34.0 pg    MCHC 32.5 32.0 - 36.0 g/dL    RDW 13.9 11.5 - 14.5 %    Platelets 99 (L) 150 - 450 x10*3/uL   Magnesium   Result Value Ref Range    Magnesium 2.30 1.60 - 2.40 mg/dL   Renal Function Panel   Result Value Ref Range    Glucose 153 (H) 74 - 99 mg/dL    Sodium 139 136 - 145 mmol/L    Potassium 3.8 3.5 - 5.3 mmol/L    Chloride 107 98 - 107 mmol/L    Bicarbonate 22 21 - 32 mmol/L    Anion Gap 14 10 - 20 mmol/L    Urea Nitrogen 31 (H) 6 - 23 mg/dL    Creatinine 1.42 (H) 0.50 - 1.30 mg/dL    eGFR 52 (L) >60 mL/min/1.73m*2    Calcium 8.2 (L) 8.6 - 10.6 mg/dL    Phosphorus 2.4 (L) 2.5 - 4.9 mg/dL    Albumin 3.1 (L) 3.4 - 5.0 g/dL   Protime-INR   Result Value Ref Range    Protime 13.4 (H) 9.8 - 12.8 seconds    INR 1.2 (H) 0.9 - 1.1   CBC and Auto Differential   Result Value Ref Range    WBC 7.9 4.4 - 11.3 x10*3/uL    nRBC 0.0 0.0 - 0.0 /100 WBCs    RBC 3.70 (L) 4.50 - 5.90 x10*6/uL    Hemoglobin 10.5 (L) 13.5 - 17.5 g/dL    Hematocrit 32.3 (L) 41.0 - 52.0 %    MCV 87 80 - 100 fL    MCH 28.4 26.0 - 34.0 pg    MCHC 32.5 32.0 - 36.0 g/dL    RDW 13.9 11.5 - 14.5 %    Platelets 99 (L) 150 - 450 x10*3/uL    Neutrophils % 78.5 40.0 - 80.0 %    Immature Granulocytes %, Automated 1.7 (H) 0.0 - 0.9 %    Lymphocytes % 10.4 13.0 - 44.0 %    Monocytes % 7.6 2.0 - 10.0 %    Eosinophils % 1.5 0.0 - 6.0 %    Basophils % 0.3 0.0 - 2.0 %    Neutrophils Absolute 6.10 (H) 1.60 - 5.50 x10*3/uL    Immature Granulocytes Absolute, Automated 0.13 0.00 - 0.50 x10*3/uL    Lymphocytes Absolute 0.81 0.80 - 3.00 x10*3/uL    Monocytes Absolute 0.59 0.05 - 0.80 x10*3/uL    Eosinophils Absolute 0.12 0.00 - 0.40 x10*3/uL    Basophils Absolute 0.02 0.00 - 0.10 x10*3/uL   Hepatic function panel  "  Result Value Ref Range    Albumin 3.1 (L) 3.4 - 5.0 g/dL    Bilirubin, Total 0.5 0.0 - 1.2 mg/dL    Bilirubin, Direct 0.2 0.0 - 0.3 mg/dL    Alkaline Phosphatase 58 33 - 136 U/L    ALT 12 10 - 52 U/L    AST 24 9 - 39 U/L    Total Protein 5.6 (L) 6.4 - 8.2 g/dL   POCT GLUCOSE   Result Value Ref Range    POCT Glucose 148 (H) 74 - 99 mg/dL   POCT GLUCOSE   Result Value Ref Range    POCT Glucose 205 (H) 74 - 99 mg/dL   POCT GLUCOSE   Result Value Ref Range    POCT Glucose 132 (H) 74 - 99 mg/dL        IMPRESSION & PLAN:  POD # 4 s/p s/p AVR and CABGx3 (LIMA-LAD, SVG-CX, SVG-PDA), LAAC  - Increase activity/ ambulation; PT/OT  - Encourage IS, C/DB; respiratory therapy; on RA with SpO2 of 96%  - Cardiac rehab referral   - Continue cardiac meds: ASA, BB, statin   - Pain and anticonstipation meds  - Chest tube removed on   - 2vcxr  ordered for   - Post op echo completed on   - Remove epicardial wires prior to discharge   - Tele until discharge  - Optimize nutrition and electrolytes    Rhythm  - Tele: SR with 75 bpm  - Continue BB  - Adjust medications as tolerated    Hypertension: home meds: norvasc 10 mg daily, hydralazne 100 mg TID, lisinopril-hydrochlorothiazide 20-25 mg daily, metoprolol succinate XL 50 daily  Systolic (24hrs), Av , Min:138 , Max:174   - Continue metoprolol 25mg BID   - lisinopril 5mg  - Additional antihypertensives as needed    Hyperlipidemia: home Crestor 20 mg daily, Tricor 145 mg daily  No results found for: \"CHOL\", \"HDL\", \"VLDL\", \"TRIG\", \"NHDL\"   - Continue rosuvastatin 20mg daily  - Follow up lipid panel with PCP/ cardiologist for ongoing lipid management    DM: home meds: Amaryl 2 mg BID; Jardiance 25 mg daily, Metformin 1,000 mg BID  Lab Results   Component Value Date    HGBA1C 6.7 (H) 2024     Results from last 7 days   Lab Units 24  1619 24  1219 24  0800 24  2030 24  1703 24  1216 24  0807   POCT GLUCOSE mg/dL 132* 205* 148* " 174* 130* 155* 192*   - Accuchecks premeal and at bedtime  - Diabetic diet  - lantus 8units at bedtime stopped on 6/25  - Lispro premeal corrective scale  - endocrine following appreciate recs  Discharge Recommendations on 6/24/24 - Home Diabetes regimen:     Metformin 1000mg orally BID  Rybelsius - Outpatient follow up consideration for Ozempic in the light of cardioprotection as well and predictability  Jardiance 25mg orally daily     Acute Blood Loss Anemia   Recent Labs     06/25/24  0649 06/24/24  0718 06/23/24  0926 06/22/24  1455 06/22/24  0155 06/21/24  1505 06/21/24  0620   HGB 10.5*  10.5* 10.2*  10.2* 10.7*  10.7* 11.0* 11.6* 13.2* 15.2   HCT 32.3*  32.3* 30.8*  30.8* 32.2*  32.2* 31.7* 32.4* 37.3* 43.3   - MV x1mo  - Daily labs, transfuse as indicated    Thrombocytopenia  Recent Labs     06/25/24  0649 06/24/24  0718 06/23/24  0926 06/22/24  1455 06/22/24  0155 06/21/24  1505 06/21/24  0620   PLT 99*  99* 78*  78* 86*  86* 110* 112* 202 183   - Etiology likely postop/CPB related  - Continue to trend with daily CBCs  - hold DVT ppx d/t downtrending PLTS   - resumed DVT ppx on 6/25    Hx of prostate cancer and chemotherapy / Now hematuria, likely d/t trauma   - hgb slightly down trending as above   - now with cherry red urine in valle  - hx of urinary retention and hx of having to have catheter replaced  - will start tamsulosin 0.4mg daily   - Valle removed on 6/25   - bladder scan post void    Volume/Electrolyte Status: Preop wt Weight: 95.1 kg (209 lb 10.5 oz)   Vitals:    06/25/24 0615   Weight: 97.8 kg (215 lb 9.6 oz)   - Weight: 98.4  - Adjust diuresis as needed for postop cardiac surgery hypervolemia  - Replete electrolytes for hypokalemia/hypomagnesemia/hypophosphatemia as needed  - Daily weights and strict I&Os  - Daily RFP while admitted    CKD stage III: baseline SCr 1.5     Creatinine   Date Value Ref Range Status   06/25/2024 1.42 (H) 0.50 - 1.30 mg/dL Final   06/24/2024 1.56 (H) 0.50  - 1.30 mg/dL Final   06/23/2024 2.08 (H) 0.50 - 1.30 mg/dL Final   -daily RFP  -diuretics as indicated  -renally dose all medications  -avoid nephrotoxic drugs  -accurate I/Os    VTE Prophylaxis: SCDs/TEDs, ambulation, SQ heparin  Code Status: Full Code    Dispo  - PT/OT recs Low intensity home   - Would benefit from homecare for cardiac surgery carepath and RN visits  - Anticipate discharge  Wednesday   - Will continue to assess discharge needs    LEVI Suh-CNP  Cardiac Surgery [facility]  [facility]  Team Phone [phone]    6/25/2024  5:34 PM

## 2024-06-25 NOTE — PROGRESS NOTES
Occupational Therapy    Evaluation    Patient Name: Umair Carney  MRN: [id number]  Today's Date: 6/25/2024  Time Calculation  Start Time: 1141  Stop Time: 1158  Time Calculation (min): 17 min        Assessment:  OT Assessment: Pt presents with deficits in strength, activity tolerance and new MITT precautions impeding occupational performance. Would benefit from skilled OT services fore return to Ind PLOF.  Prognosis: Good  Barriers to Discharge: None  Evaluation/Treatment Tolerance: Patient limited by fatigue  End of Session Communication: Bedside nurse  End of Session Patient Position: Up in chair, Alarm off, not on at start of session  OT Assessment Results: Decreased ADL status, Decreased endurance, Decreased functional mobility, Decreased gross motor control, Decreased IADLs  Prognosis: Good  Barriers to Discharge: None  Evaluation/Treatment Tolerance: Patient limited by fatigue  Strengths: Support of Caregivers, Premorbid level of function  Barriers to Participation: Comorbidities  Plan:  Treatment Interventions: ADL retraining, Functional transfer training, Endurance training, Equipment evaluation/education, Compensatory technique education, Patient/family training  OT Frequency: 2 times per week  OT Discharge Recommendations: Low intensity level of continued care  OT Recommended Transfer Status: Assist of 1  OT - OK to Discharge: Yes  Treatment Interventions: ADL retraining, Functional transfer training, Endurance training, Equipment evaluation/education, Compensatory technique education, Patient/family training    Subjective   Current Problem:  1. Aortic valve stenosis, etiology of cardiac valve disease unspecified  Anesthesia Intraoperative Transesophageal Echocardiogram    Anesthesia Intraoperative Transesophageal Echocardiogram    Anesthesia Intraoperative Transesophageal Echocardiogram    Anesthesia Intraoperative Transesophageal Echocardiogram    Anesthesia Intraoperative Transesophageal Echocardiogram     Anesthesia Intraoperative Transesophageal Echocardiogram    Central Line    Central Line    Hepatic function panel    Hepatic function panel    Hepatic function panel    Transthoracic Echo (TTE) Complete    Transthoracic Echo (TTE) Complete    Hepatic function panel    Hepatic function panel    Hepatic function panel    Hepatic function panel    Hepatic function panel    Hepatic function panel      2. Nonrheumatic aortic valve stenosis  Anesthesia Intraoperative Transesophageal Echocardiogram    Anesthesia Intraoperative Transesophageal Echocardiogram    Surgical Pathology Exam    Surgical Pathology Exam    Transthoracic Echo (TTE) Complete    Transthoracic Echo (TTE) Complete      3. Other nonrheumatic aortic valve disorders  Anesthesia Intraoperative Transesophageal Echocardiogram      4. Presence of prosthetic heart valve  Transthoracic Echo (TTE) Complete        General:  General  Reason for Referral: AVR and CABGx3 (LIMA-LAD, SVG-CX, SVG-PDA), LAAC  Past Medical History Relevant to Rehab: HTN, HLD, Prostate cancer s/p radiation in 2016, T2DM  Missed Visit: No  Family/Caregiver Present: No  Prior to Session Communication: Bedside nurse  Patient Position Received: Up in chair  General Comment: Pt agreeable to OT. Cooperative, somewhat flat.  Precautions:  Medical Precautions: Cardiac precautions, Fall precautions, Chest tube  Post-Surgical Precautions: Move in the Tube  Vital Signs:     Pain:  Pain Assessment  Pain Assessment: 0-10  0-10 (Numeric) Pain Score: 0 - No pain    Objective   Cognition:  Overall Cognitive Status: Within Functional Limits  Orientation Level:  (grossly assessed)  Attention: Within Functional Limits  Insight: Mild           Home Living:  Type of Home: House  Lives With: Spouse  Home Adaptive Equipment: Walker rolling or standard  Home Layout: One level  Bathroom Shower/Tub: Walk-in shower  Bathroom Toilet: Standard  Bathroom Equipment: Grab bars in shower, Raised toilet seat with  rails  Prior Function:  Level of [place]: Independent with ADLs and functional transfers, Independent with homemaking with ambulation  ADL Assistance: Independent  Homemaking Assistance: Independent  Ambulatory Assistance: Independent  Vocational: Full time employment (Maintenance)  Prior Function Comments: (+) driving. Reports 1 fall past year, falling down the stairs    ADL:  Eating Assistance: Independent  Grooming Assistance: Stand by  Bathing Assistance: Minimal  UE Dressing Assistance: Stand by  LE Dressing Assistance: Stand by  Toileting Assistance with Device: Stand by  Activity Tolerance:  Endurance: Tolerates 10 - 20 min exercise with multiple rests  Early Mobility/Exercise Safety Screen: Proceed with mobilization - No exclusion criteria met  Bed Mobility/Transfers: Bed Mobility  Bed Mobility: No    Transfers  Transfer: Yes  Transfer 1  Transfer From 1: Sit to  Transfer to 1: Chair with arms  Technique 1: Sit to stand  Transfer Level of Assistance 1: Minimum assistance      Vision:   Sensation:  Light Touch: No apparent deficits  Strength:     Perception:  Inattention/Neglect: Appears intact  Initiation: Appears intact  Motor Planning: Appears intact    Hand Function:  Gross Grasp: Functional  Coordination: Functional  Extremities: RUE   RUE : Within Functional Limits and LUE   LUE: Within Functional Limits    Outcome Measures:[facility] Daily Activity  Putting on and taking off regular lower body clothing: A little  Bathing (including washing, rinsing, drying): A little  Putting on and taking off regular upper body clothing: A little  Toileting, which includes using toilet, bedpan or urinal: A little  Taking care of personal grooming such as brushing teeth: A little  Eating Meals: None  Daily Activity - Total Score: 19         and Brief Confusion Assessment Method (bCAM)  Feature 1: Altered Mental Status or Fluctuating Course: No  CAM Result: CAM -    Education Documentation  Body Mechanics, taught by  Dolly Sales OT at 6/25/2024  1:37 PM.  Learner: Patient  Readiness: Acceptance  Method: Explanation  Response: Verbalizes Understanding, Needs Reinforcement    Precautions, taught by Dolly Sales OT at 6/25/2024  1:37 PM.  Learner: Patient  Readiness: Acceptance  Method: Explanation  Response: Verbalizes Understanding, Needs Reinforcement    ADL Training, taught by Dolly Sales OT at 6/25/2024  1:37 PM.  Learner: Patient  Readiness: Acceptance  Method: Explanation  Response: Verbalizes Understanding, Needs Reinforcement    Education Comments  No comments found.          Goals:  Encounter Problems       Encounter Problems (Active)       ADLs       Patient with complete lower body dressing with independent level of assistance  (Progressing)       Start:  06/25/24    Expected End:  07/09/24               MOBILITY       Patient will perform Functional mobility Household distances/Community Distances with modified independent level of assistance and least restrictive device in order to improve safety and functional mobility. (Progressing)       Start:  06/25/24    Expected End:  07/09/24               TRANSFERS       Patient will complete functional transfers with least restrictive device with modified independent level of assistance. (Progressing)       Start:  06/25/24    Expected End:  07/09/24                    [employer] OT

## 2024-06-26 ENCOUNTER — APPOINTMENT (OUTPATIENT)
Dept: RADIOLOGY | Facility: HOSPITAL | Age: 74
DRG: 220 | End: 2024-06-26
Payer: MEDICARE

## 2024-06-26 ENCOUNTER — HOME HEALTH ADMISSION (OUTPATIENT)
Dept: HOME HEALTH SERVICES | Facility: HOME HEALTH | Age: 74
End: 2024-06-26
Payer: MEDICARE

## 2024-06-26 ENCOUNTER — DOCUMENTATION (OUTPATIENT)
Dept: HOME HEALTH SERVICES | Facility: HOME HEALTH | Age: 74
End: 2024-06-26
Payer: MEDICARE

## 2024-06-26 PROBLEM — I35.0 AORTIC VALVE STENOSIS: Status: RESOLVED | Noted: 2024-04-12 | Resolved: 2024-06-26

## 2024-06-26 PROBLEM — I35.0 AORTIC VALVE STENOSIS, ETIOLOGY OF CARDIAC VALVE DISEASE UNSPECIFIED: Status: RESOLVED | Noted: 2024-06-21 | Resolved: 2024-06-26

## 2024-06-26 LAB
ALBUMIN SERPL BCP-MCNC: 3.2 G/DL (ref 3.4–5)
ALBUMIN SERPL BCP-MCNC: 3.2 G/DL (ref 3.4–5)
ALP SERPL-CCNC: 69 U/L (ref 33–136)
ALT SERPL W P-5'-P-CCNC: 12 U/L (ref 10–52)
ANION GAP SERPL CALC-SCNC: 14 MMOL/L (ref 10–20)
AST SERPL W P-5'-P-CCNC: 17 U/L (ref 9–39)
BASOPHILS # BLD AUTO: 0.04 X10*3/UL (ref 0–0.1)
BASOPHILS NFR BLD AUTO: 0.5 %
BILIRUB DIRECT SERPL-MCNC: 0.1 MG/DL (ref 0–0.3)
BILIRUB SERPL-MCNC: 0.5 MG/DL (ref 0–1.2)
BUN SERPL-MCNC: 29 MG/DL (ref 6–23)
CALCIUM SERPL-MCNC: 8.4 MG/DL (ref 8.6–10.6)
CHLORIDE SERPL-SCNC: 108 MMOL/L (ref 98–107)
CHOLEST SERPL-MCNC: 102 MG/DL (ref 0–199)
CHOLESTEROL/HDL RATIO: 4.1
CO2 SERPL-SCNC: 22 MMOL/L (ref 21–32)
CREAT SERPL-MCNC: 1.42 MG/DL (ref 0.5–1.3)
EGFRCR SERPLBLD CKD-EPI 2021: 52 ML/MIN/1.73M*2
EOSINOPHIL # BLD AUTO: 0.12 X10*3/UL (ref 0–0.4)
EOSINOPHIL NFR BLD AUTO: 1.5 %
ERYTHROCYTE [DISTWIDTH] IN BLOOD BY AUTOMATED COUNT: 14.1 % (ref 11.5–14.5)
ERYTHROCYTE [DISTWIDTH] IN BLOOD BY AUTOMATED COUNT: 14.1 % (ref 11.5–14.5)
GLUCOSE BLD MANUAL STRIP-MCNC: 120 MG/DL (ref 74–99)
GLUCOSE BLD MANUAL STRIP-MCNC: 139 MG/DL (ref 74–99)
GLUCOSE BLD MANUAL STRIP-MCNC: 162 MG/DL (ref 74–99)
GLUCOSE BLD MANUAL STRIP-MCNC: 198 MG/DL (ref 74–99)
GLUCOSE SERPL-MCNC: 149 MG/DL (ref 74–99)
HCT VFR BLD AUTO: 32.4 % (ref 41–52)
HCT VFR BLD AUTO: 32.4 % (ref 41–52)
HDLC SERPL-MCNC: 24.7 MG/DL
HGB BLD-MCNC: 10.8 G/DL (ref 13.5–17.5)
HGB BLD-MCNC: 10.8 G/DL (ref 13.5–17.5)
IMM GRANULOCYTES # BLD AUTO: 0.09 X10*3/UL (ref 0–0.5)
IMM GRANULOCYTES NFR BLD AUTO: 1.1 % (ref 0–0.9)
LDLC SERPL CALC-MCNC: 24 MG/DL
LYMPHOCYTES # BLD AUTO: 0.72 X10*3/UL (ref 0.8–3)
LYMPHOCYTES NFR BLD AUTO: 9 %
MAGNESIUM SERPL-MCNC: 2.22 MG/DL (ref 1.6–2.4)
MCH RBC QN AUTO: 28.6 PG (ref 26–34)
MCH RBC QN AUTO: 28.6 PG (ref 26–34)
MCHC RBC AUTO-ENTMCNC: 33.3 G/DL (ref 32–36)
MCHC RBC AUTO-ENTMCNC: 33.3 G/DL (ref 32–36)
MCV RBC AUTO: 86 FL (ref 80–100)
MCV RBC AUTO: 86 FL (ref 80–100)
MONOCYTES # BLD AUTO: 0.65 X10*3/UL (ref 0.05–0.8)
MONOCYTES NFR BLD AUTO: 8.2 %
NEUTROPHILS # BLD AUTO: 6.34 X10*3/UL (ref 1.6–5.5)
NEUTROPHILS NFR BLD AUTO: 79.7 %
NON HDL CHOLESTEROL: 77 MG/DL (ref 0–149)
NRBC BLD-RTO: 0 /100 WBCS (ref 0–0)
NRBC BLD-RTO: 0 /100 WBCS (ref 0–0)
PHOSPHATE SERPL-MCNC: 2.8 MG/DL (ref 2.5–4.9)
PLATELET # BLD AUTO: 126 X10*3/UL (ref 150–450)
PLATELET # BLD AUTO: 126 X10*3/UL (ref 150–450)
POTASSIUM SERPL-SCNC: 3.7 MMOL/L (ref 3.5–5.3)
PROT SERPL-MCNC: 5.9 G/DL (ref 6.4–8.2)
RBC # BLD AUTO: 3.78 X10*6/UL (ref 4.5–5.9)
RBC # BLD AUTO: 3.78 X10*6/UL (ref 4.5–5.9)
SODIUM SERPL-SCNC: 140 MMOL/L (ref 136–145)
TRIGL SERPL-MCNC: 269 MG/DL (ref 0–149)
VLDL: 54 MG/DL (ref 0–40)
WBC # BLD AUTO: 8 X10*3/UL (ref 4.4–11.3)
WBC # BLD AUTO: 8 X10*3/UL (ref 4.4–11.3)

## 2024-06-26 PROCEDURE — 2500000004 HC RX 250 GENERAL PHARMACY W/ HCPCS (ALT 636 FOR OP/ED): Performed by: NURSE PRACTITIONER

## 2024-06-26 PROCEDURE — 2500000001 HC RX 250 WO HCPCS SELF ADMINISTERED DRUGS (ALT 637 FOR MEDICARE OP): Performed by: NURSE PRACTITIONER

## 2024-06-26 PROCEDURE — 1200000002 HC GENERAL ROOM WITH TELEMETRY DAILY

## 2024-06-26 PROCEDURE — 2500000004 HC RX 250 GENERAL PHARMACY W/ HCPCS (ALT 636 FOR OP/ED)

## 2024-06-26 PROCEDURE — 83735 ASSAY OF MAGNESIUM: CPT

## 2024-06-26 PROCEDURE — 71046 X-RAY EXAM CHEST 2 VIEWS: CPT | Performed by: RADIOLOGY

## 2024-06-26 PROCEDURE — 2500000002 HC RX 250 W HCPCS SELF ADMINISTERED DRUGS (ALT 637 FOR MEDICARE OP, ALT 636 FOR OP/ED)

## 2024-06-26 PROCEDURE — 82947 ASSAY GLUCOSE BLOOD QUANT: CPT | Mod: 91

## 2024-06-26 PROCEDURE — 2500000001 HC RX 250 WO HCPCS SELF ADMINISTERED DRUGS (ALT 637 FOR MEDICARE OP): Performed by: STUDENT IN AN ORGANIZED HEALTH CARE EDUCATION/TRAINING PROGRAM

## 2024-06-26 PROCEDURE — 71046 X-RAY EXAM CHEST 2 VIEWS: CPT

## 2024-06-26 PROCEDURE — 80061 LIPID PANEL: CPT | Performed by: NURSE PRACTITIONER

## 2024-06-26 PROCEDURE — 82040 ASSAY OF SERUM ALBUMIN: CPT | Performed by: THORACIC SURGERY (CARDIOTHORACIC VASCULAR SURGERY)

## 2024-06-26 PROCEDURE — 80069 RENAL FUNCTION PANEL: CPT

## 2024-06-26 PROCEDURE — 2500000001 HC RX 250 WO HCPCS SELF ADMINISTERED DRUGS (ALT 637 FOR MEDICARE OP)

## 2024-06-26 PROCEDURE — 85025 COMPLETE CBC W/AUTO DIFF WBC: CPT

## 2024-06-26 PROCEDURE — 2500000002 HC RX 250 W HCPCS SELF ADMINISTERED DRUGS (ALT 637 FOR MEDICARE OP, ALT 636 FOR OP/ED): Performed by: NURSE PRACTITIONER

## 2024-06-26 RX ORDER — MULTIVIT-MIN/IRON FUM/FOLIC AC 7.5 MG-4
1 TABLET ORAL DAILY
COMMUNITY
Start: 2024-06-27

## 2024-06-26 RX ORDER — POLYETHYLENE GLYCOL 3350 17 G/17G
17 POWDER, FOR SOLUTION ORAL DAILY PRN
COMMUNITY
Start: 2024-06-26

## 2024-06-26 RX ORDER — TAMSULOSIN HYDROCHLORIDE 0.4 MG/1
0.8 CAPSULE ORAL DAILY
Qty: 60 CAPSULE | Refills: 0 | Status: SHIPPED | OUTPATIENT
Start: 2024-06-27 | End: 2024-07-27

## 2024-06-26 RX ORDER — TRAMADOL HYDROCHLORIDE 50 MG/1
50 TABLET ORAL EVERY 6 HOURS PRN
Status: DISCONTINUED | OUTPATIENT
Start: 2024-06-26 | End: 2024-06-27 | Stop reason: HOSPADM

## 2024-06-26 RX ORDER — FUROSEMIDE 10 MG/ML
40 INJECTION INTRAMUSCULAR; INTRAVENOUS ONCE
Status: COMPLETED | OUTPATIENT
Start: 2024-06-26 | End: 2024-06-26

## 2024-06-26 RX ORDER — LIDOCAINE HYDROCHLORIDE 20 MG/ML
1 JELLY TOPICAL ONCE
Status: DISCONTINUED | OUTPATIENT
Start: 2024-06-26 | End: 2024-06-27 | Stop reason: HOSPADM

## 2024-06-26 RX ORDER — LANOLIN ALCOHOL/MO/W.PET/CERES
400 CREAM (GRAM) TOPICAL ONCE
Status: COMPLETED | OUTPATIENT
Start: 2024-06-26 | End: 2024-06-26

## 2024-06-26 RX ORDER — TAMSULOSIN HYDROCHLORIDE 0.4 MG/1
0.8 CAPSULE ORAL DAILY
Status: DISCONTINUED | OUTPATIENT
Start: 2024-06-26 | End: 2024-06-27 | Stop reason: HOSPADM

## 2024-06-26 RX ORDER — LANOLIN ALCOHOL/MO/W.PET/CERES
400 CREAM (GRAM) TOPICAL ONCE
Qty: 1 TABLET | Refills: 0 | Status: SHIPPED | OUTPATIENT
Start: 2024-06-26 | End: 2024-06-26

## 2024-06-26 RX ORDER — TRAMADOL HYDROCHLORIDE 50 MG/1
50 TABLET ORAL EVERY 8 HOURS PRN
Qty: 15 TABLET | Refills: 0 | Status: SHIPPED | OUTPATIENT
Start: 2024-06-26 | End: 2024-07-01

## 2024-06-26 RX ORDER — ROSUVASTATIN CALCIUM 40 MG/1
40 TABLET, COATED ORAL NIGHTLY
Qty: 30 TABLET | Refills: 0 | Status: SHIPPED | OUTPATIENT
Start: 2024-06-26 | End: 2024-07-26

## 2024-06-26 RX ORDER — ACETAMINOPHEN 325 MG/1
650 TABLET ORAL EVERY 6 HOURS PRN
COMMUNITY
Start: 2024-06-26

## 2024-06-26 RX ORDER — METOPROLOL TARTRATE 25 MG/1
25 TABLET, FILM COATED ORAL 2 TIMES DAILY
Qty: 60 TABLET | Refills: 0 | Status: SHIPPED | OUTPATIENT
Start: 2024-06-26 | End: 2024-07-26

## 2024-06-26 RX ORDER — POTASSIUM CHLORIDE 20 MEQ/1
40 TABLET, EXTENDED RELEASE ORAL ONCE
Status: COMPLETED | OUTPATIENT
Start: 2024-06-26 | End: 2024-06-26

## 2024-06-26 ASSESSMENT — COGNITIVE AND FUNCTIONAL STATUS - GENERAL
STANDING UP FROM CHAIR USING ARMS: A LITTLE
CLIMB 3 TO 5 STEPS WITH RAILING: A LOT
MOBILITY SCORE: 18
HELP NEEDED FOR BATHING: A LITTLE
DRESSING REGULAR UPPER BODY CLOTHING: A LITTLE
DRESSING REGULAR LOWER BODY CLOTHING: A LITTLE
WALKING IN HOSPITAL ROOM: A LITTLE
DAILY ACTIVITIY SCORE: 19
TOILETING: A LITTLE
TURNING FROM BACK TO SIDE WHILE IN FLAT BAD: A LITTLE
MOVING TO AND FROM BED TO CHAIR: A LITTLE
PERSONAL GROOMING: A LITTLE

## 2024-06-26 ASSESSMENT — PAIN DESCRIPTION - LOCATION: LOCATION: PENIS

## 2024-06-26 ASSESSMENT — PAIN SCALES - GENERAL
PAINLEVEL_OUTOF10: 0 - NO PAIN

## 2024-06-26 NOTE — PROGRESS NOTES
"CARDIAC SURGERY DAILY PROGRESS NOTE    Umair Carney is a 74 year old male with PMHx of HTN, HLD, Prostate cancer s/p radiation in 2016, T2DM who presented to [facility] on 6/21/2024 for planned cardiac surgery.     Patient was referred to Dr. Durant for nonrheumatic aortic valve stenosis.      OPERATION/PROCEDURE: 6/21/2024 with Usman Durant   1. Median Sternotomy   2. Replacement Aortic Valve with XL Perceval Plus  3. ALEX clip with 35mm Atricure  4. CABG x3 LIMA-LAD, SVG-CX, SVG-PDA    CTICU Course: uneventful   Transfer to LT3: 6/24/2024  ===================    Interval History:   6/26: Acute urinary retention, 900mL on bladder scan - straight cath x1; failed TOV - replaced valle     SUBJECTIVE:  No complaints.     Objective   /62   Pulse 76   Temp 37 °C (98.6 °F) (Temporal)   Resp 18   Ht 1.854 m (6' 1\")   Wt 96.7 kg (213 lb 3.2 oz)   SpO2 98%   BMI 28.13 kg/m²   0-10 (Numeric) Pain Score: 0 - No pain   3 Day Weight Change: -0.575 kg (-1 lb 4.3 oz) per day    Intake and Output    Intake/Output Summary (Last 24 hours) at 6/26/2024 1543  Last data filed at 6/26/2024 1327  Gross per 24 hour   Intake 840 ml   Output 425 ml   Net 415 ml     Physical Exam  Vitals and nursing note reviewed.   Constitutional:       General: He is not in acute distress.     Appearance: Normal appearance. He is obese. He is not ill-appearing.      Comments: Patient alert and awake sitting up in chair in no acute distress.  Wife at bedside.    HENT:      Head: Normocephalic.      Nose: Nose normal.      Mouth/Throat:      Mouth: Mucous membranes are moist.   Eyes:      Conjunctiva/sclera: Conjunctivae normal.   Cardiovascular:      Rate and Rhythm: Normal rate and regular rhythm.      Pulses: Normal pulses.      Heart sounds: Normal heart sounds. No murmur heard.     No gallop.      Comments: Tele: SR 60's to 70's   +2 Equal and even pulses in all extremities   A/V Wires capped 6/26.     Pulmonary:      " Effort: Pulmonary effort is normal. No respiratory distress.      Breath sounds: Normal breath sounds. No wheezing.      Comments: Equal and even chest expansion; thorax symmetric  Diminished breath sounds in bilateral bases   Good inspiratory effort on RA.   Incentive spirometer 2L   Sternum Stable    Abdominal:      General: Bowel sounds are normal. There is no distension.      Palpations: Abdomen is soft.      Tenderness: There is no abdominal tenderness.      Comments: (+) BM 6/23, 6/25  Appetite good.    Genitourinary:     Comments: 18F Coudet Valle catheter in place - (placed 6/26 for acute urinary retention)   Light caroline colored urine in valle bag   Musculoskeletal:      Cervical back: Neck supple.      Right lower leg: No edema.      Left lower leg: No edema.      Comments: PRESLEY; 5/5 strength in all extremities   Ambulates independently without assistance or a gait aide.    Skin:     General: Skin is warm and dry.      Capillary Refill: Capillary refill takes less than 2 seconds.      Coloration: Skin is not jaundiced or pale.      Comments: Mid-Sternal Incision: well approximated, no s/s of infection or bleeding, no crepitus - WALLY     Right SVG: well approximated, no s/s of infection or bleeding - WALLY        Neurological:      General: No focal deficit present.      Mental Status: He is alert and oriented to person, place, and time.   Psychiatric:         Mood and Affect: Mood normal.         Behavior: Behavior normal.       Medications  Scheduled medications  acetaminophen, 650 mg, oral, q6h  amLODIPine, 10 mg, oral, Daily  aspirin, 81 mg, oral, Daily  empagliflozin, 25 mg, oral, Daily  heparin (porcine), 5,000 Units, subcutaneous, q8h  insulin lispro, 0-5 Units, subcutaneous, TID  insulin lispro, 4 Units, subcutaneous, TID  lidocaine, 1 Application, urethral, Once  lisinopril, 5 mg, oral, Daily  magnesium oxide, 400 mg, oral, Once  metFORMIN, 1,000 mg, oral, BID  metoprolol tartrate, 25 mg, oral,  BID  multivitamin with minerals, 1 tablet, oral, Daily  polyethylene glycol, 17 g, oral, BID  potassium chloride CR, 40 mEq, oral, Once  rosuvastatin, 40 mg, oral, Nightly  sennosides-docusate sodium, 2 tablet, oral, BID  tamsulosin, 0.8 mg, oral, Daily    Continuous medications   PRN medications  PRN medications: dextrose **OR** glucagon, naloxone, [DISCONTINUED] ondansetron **OR** ondansetron, oxygen, traMADol    LABS:  CMP:  Results from last 7 days   Lab Units 06/26/24  0718 06/25/24  0649 06/24/24  0718 06/23/24  0926 06/22/24  1455 06/22/24  0155 06/21/24  1505 06/21/24  0620   SODIUM mmol/L 140 139 139 135* 137 141 141 139   POTASSIUM mmol/L 3.7 3.8 3.6 4.1 4.1 4.3 3.3* 3.5   CHLORIDE mmol/L 108* 107 106 102 105 109* 107 104   CO2 mmol/L 22 22 23 22 20* 22 23 22   ANION GAP mmol/L 14 14 14 15 16 14 14 17   BUN mg/dL 29* 31* 35* 40* 37* 28* 24* 26*   CREATININE mg/dL 1.42* 1.42* 1.56* 2.08* 1.95* 1.76* 1.85* 1.42*   EGFR mL/min/1.73m*2 52* 52* 47* 33* 36* 40* 38* 52*   MAGNESIUM mg/dL 2.22 2.30 2.38 2.33 2.37 2.46* 2.61* 2.03   ALBUMIN g/dL 3.2*  3.2* 3.1*  3.1* 3.2*  3.2* 3.4 3.4  3.3* 3.6 3.5 4.0   ALT U/L 12 12 4*  --  10 13  --  21   AST U/L 17 24 15  --  19 21  --  15   BILIRUBIN TOTAL mg/dL 0.5 0.5 0.4  --  0.3 0.4  --  0.6     CBC:  Results from last 7 days   Lab Units 06/26/24  0718 06/25/24  0649 06/24/24  0718 06/23/24  0926 06/22/24  1455 06/22/24  0155 06/21/24  1505 06/21/24  0620   WBC AUTO x10*3/uL 8.0  8.0 7.9  7.9 9.6  9.6 13.2*  13.2* 14.1* 8.8 26.3* 7.2   HEMOGLOBIN g/dL 10.8*  10.8* 10.5*  10.5* 10.2*  10.2* 10.7*  10.7* 11.0* 11.6* 13.2* 15.2   HEMATOCRIT % 32.4*  32.4* 32.3*  32.3* 30.8*  30.8* 32.2*  32.2* 31.7* 32.4* 37.3* 43.3   PLATELETS AUTO x10*3/uL 126*  126* 99*  99* 78*  78* 86*  86* 110* 112* 202 183   MCV fL 86  86 87  87 86  86 89  89 84 82 83 82     COAG:   Results from last 7 days   Lab Units 06/25/24  0649 06/24/24  0718 06/23/24  0926 06/21/24  1505    INR  1.2* 1.2* 1.1 1.1     HEME/ENDO:     CARDIAC:   Results from last 7 days   Lab Units 06/22/24  0155 06/21/24  1552 06/21/24  0620   TROPHS ng/L 2,782* 2,066* 47        XR Chest 2 Views: 6/26/2024    FINDINGS:  PA and lateral radiographs of the chest were provided.  Additional PA  dual energy images were also provided.      Postsurgical changes of median sternotomy. Left atrial appendage  closure device. Cardiac valve replacement/repair.  CARDIOMEDIASTINAL SILHOUETTE:  Cardiomediastinal silhouette is stable in size and configuration.  LUNGS:  Left mid lung linear density consistent with an area of discoid  atelectasis.. Slight blunting of the right costophrenic angle. No  evidence of pneumothorax.  ABDOMEN:  No remarkable upper abdominal findings.   BONES:  No acute osseous changes.      IMPRESSION:  1.  Status post median sternotomy with medical devices as detailed  above.  2. Trace right pleural effusion with adjacent atelectasis. No  evidence of pneumothorax.        Signed by: Razia Craig 6/26/2024 10:17 AM  Dictation workstation:   [id number]    Transthoracic Echo (TTE) Complete with Contrast 6/24/2024    PHYSICIAN INTERPRETATION:  Left Ventricle: The left ventricular systolic function is mildly decreased, with a visually estimated ejection fraction of 45%. There is global hypokinesis of the left ventricle with minor regional variations. The left ventricular cavity size is normal. There is mild concentric left ventricular hypertrophy. Abnormal (paradoxical) septal motion consistent with post-operative status. Spectral Doppler shows a pseudonormal pattern of left ventricular diastolic filling.  Left Atrium: The left atrium is mildly dilated.  Right Ventricle: The right ventricle is mildly enlarged. There is mildly reduced right ventricular systolic function.  Right Atrium: The right atrium is mildly dilated.  Aortic Valve: There is a prosthetic aortic valve present. The aortic valve dimensionless index  is 0.49. There is no evidence of aortic valve regurgitation. The peak instantaneous gradient of the aortic valve is 16.6 mmHg. The mean gradient of the aortic valve is 9.0 mmHg.  Mitral Valve: The mitral valve is normal in structure. There is trace mitral valve regurgitation.  Tricuspid Valve: The tricuspid valve was not well visualized. There is trace tricuspid regurgitation.  Pulmonic Valve: The pulmonic valve is not well visualized. There is mild pulmonic valve regurgitation.  Pericardium: There is no pericardial effusion noted.  Aorta: The aortic root was not well visualized.     CONCLUSIONS:   1. The left ventricular systolic function is mildly decreased, with a visually estimated ejection fraction of 45%.   2. There is global hypokinesis of the left ventricle with minor regional variations.   3. Spectral Doppler shows a pseudonormal pattern of left ventricular diastolic filling.   4. Mildly enlarged right ventricle.   5. There is mildly reduced right ventricular systolic function.   6. Hemodynamics are consistent with normal functioning of the bioprosthetic aortic valve.   7. The left atrium is mildly dilated.   8. Abnormal septal motion consistent with post-operative status.     [id number] Mj Cody MD  Electronically signed on 6/24/2024 at 4:16:54 PM    ** Final **    IMPRESSION & PLAN:  POD # 5 s/p s/p AVR and CABGx3     - Increase activity/ ambulation; PT/OT  - Encourage IS, C/DB; respiratory therapy; on RA with SpO2 of 96%  - Cardiac rehab referral   - Continue cardiac meds: ASA, BB, statin   - Pain and anticonstipation meds  - Chest tube removed on 6/25  - 2V CXR completed on 6/26/2024.   - Post op echo completed on 6/24 and showed an LVEF of 45%  - CUT epicardial wires prior to discharge; per surgeon preference.    - Tele until discharge    Rhythm  - Tele: SR 60's to 70's   - Continue BB: metoprolol tartrate 25mg BID   - Adjust medications as tolerated    Hypertension: home meds: norvasc 10 mg daily,  hydralazne 100 mg TID, lisinopril-hydrochlorothiazide 20-25 mg daily, metoprolol succinate XL 50 daily  Systolic (24hrs), Av , Min:105 , Max:168   - Continue BB  - Continue Lisinopril 5mg   - Additional antihypertensives as needed    Hyperlipidemia: home Crestor 20 mg daily, Tricor 145 mg daily  Lab Results   Component Value Date    CHOL 102 2024    HDL 24.7 2024    VLDL 54 (H) 2024    TRIG 269 (H) 2024    NHDL 77 2024      - Continue high intensity statin: Crestor 40mg nightly   - Follow up lipid panel with PCP/ cardiologist for ongoing lipid management    Diabetes Mellitus Type 2: home meds: Amaryl 2 mg BID; Jardiance 25 mg daily, Metformin 1,000 mg BID  Lab Results   Component Value Date    HGBA1C 6.7 (H) 2024     Results from last 7 days   Lab Units 24  1155 24  0739 24  1619 24  1219 24  0800 24  2030   POCT GLUCOSE mg/dL 198* 162* 151* 132* 205* 148* 174*     - Continue Lispro premeal corrective scale: SSI #2  - Continue home metformin 1,00mg BID   - : Stopped Jardiance due to valle being replaced and likely being discharged with valle   SGLT2i tx may not be utilized in inpt setting unless the following conditions are met. Only HF Cardiology may initiate inpatient SGLT2i use.   1) pt is eating and drinking by mouth with a total daily carb intake exceeding 60g of CHO  2) pt is urinating independently of urinary catheters  3) pt can ambulate freely to the restroom in order to maintain personal hygiene  4) no current concern for UTI/genital or inguinal candidiasis  5) no plans for NPO within the next 48-72hr     - Accuchecks premeal and at bedtime  - Diabetic diet  - endocrine following appreciate recs  Discharge Recommendations on 24 - Home Diabetes regimen:     Metformin 1000mg orally BID  [place] - Outpatient follow up consideration for Ozempic in the light of cardioprotection as well and predictability  --  unable to discharge patient on Jardiance due to valle replaced with acute urinary retention       Acute Blood Loss Anemia   Recent Labs     06/26/24  0718 06/25/24  0649 06/24/24  0718 06/23/24  0926 06/22/24  1455 06/22/24  0155 06/21/24  1505   HGB 10.8*  10.8* 10.5*  10.5* 10.2*  10.2* 10.7*  10.7* 11.0* 11.6* 13.2*   HCT 32.4*  32.4* 32.3*  32.3* 30.8*  30.8* 32.2*  32.2* 31.7* 32.4* 37.3*   - MV x1mo  - Daily labs, transfuse as indicated    Thrombocytopenia  Recent Labs     06/26/24  0718 06/25/24  0649 06/24/24  0718 06/23/24  0926 06/22/24  1455 06/22/24  0155 06/21/24  1505   *  126* 99*  99* 78*  78* 86*  86* 110* 112* 202   - Etiology likely postop/CPB related  - Trend with daily CBCs    Hx of prostate cancer and chemotherapy w/ hematuria   BPH:   - hx of urinary retention and hx of having to have catheter replaced s/p previous knee surgery   - continue tamsulosin: 6/26 Increased dose to 0.8mg daily   - 6/26: Acute urinary retention; 900mL on bladder scan; straight cath x1   ---Urinary Retention in afternoon after TOV; 700mL on bladder scan   --- 18F Coudet Valle Catheter placed  - Will Discharge patient with valle   - Follow up with Voiding trial as an outpatient with urology     Chronic Kidney Disease Stage III: baseline SCr 1.5     Creatinine   Date Value Ref Range Status   06/26/2024 1.42 (H) 0.50 - 1.30 mg/dL Final   06/25/2024 1.42 (H) 0.50 - 1.30 mg/dL Final   06/24/2024 1.56 (H) 0.50 - 1.30 mg/dL Final     Volume/Electrolyte Status: Preop wt Weight: 95.1 kg (209 lb 10.5 oz)   Vitals:    06/26/24 0652   Weight: 96.7 kg (213 lb 3.2 oz)   - Weight: 98.4  - Adjust diuresis for postop cardiac surgery hypervolemia  - 6/26: Lasix 40mg IV x2; replaced K & mg   - Replete electrolytes for hypokalemia/hypomagnesemia  - renally dose all medications/avoid nephrotoxic drugs  - Daily weights/strict I&Os  - Daily RFP       VTE Prophylaxis: SCDs/TEDs, ambulation, SQ heparin  Code Status: Full  Code    Dispo  - PT/OT recs Low intensity home   - Would benefit from homecare for cardiac surgery carepath and RN visits  - Anticipate discharge to home tomorrow 6/27 to home with valle in place; will follow up with urology as an outpatient   - Will continue to assess discharge needs    LEVI Torres-CNP  Cardiac Surgery [facility]  [facility]  Team Phone [phone]    6/26/2024  3:43 PM

## 2024-06-26 NOTE — PROGRESS NOTES
Physical Therapy                 Therapy Communication Note    Patient Name: Umair Carney  MRN: [id number]  Today's Date: 6/26/2024     Discipline: Physical Therapy    Missed Visit Reason: Missed Visit Reason:  (Patient with tramatic valle removal and declining therapy at this time)    Missed Time: Attempt    Comment:

## 2024-06-27 VITALS
BODY MASS INDEX: 27.14 KG/M2 | HEART RATE: 69 BPM | SYSTOLIC BLOOD PRESSURE: 101 MMHG | WEIGHT: 204.8 LBS | OXYGEN SATURATION: 97 % | TEMPERATURE: 98.2 F | DIASTOLIC BLOOD PRESSURE: 62 MMHG | HEIGHT: 73 IN | RESPIRATION RATE: 17 BRPM

## 2024-06-27 LAB
ALBUMIN SERPL BCP-MCNC: 3.3 G/DL (ref 3.4–5)
ALBUMIN SERPL BCP-MCNC: 3.3 G/DL (ref 3.4–5)
ALP SERPL-CCNC: 71 U/L (ref 33–136)
ALT SERPL W P-5'-P-CCNC: 14 U/L (ref 10–52)
ANION GAP SERPL CALC-SCNC: 16 MMOL/L (ref 10–20)
AST SERPL W P-5'-P-CCNC: 18 U/L (ref 9–39)
BASOPHILS # BLD AUTO: 0.03 X10*3/UL (ref 0–0.1)
BASOPHILS NFR BLD AUTO: 0.4 %
BILIRUB DIRECT SERPL-MCNC: 0 MG/DL (ref 0–0.3)
BILIRUB SERPL-MCNC: 0.5 MG/DL (ref 0–1.2)
BUN SERPL-MCNC: 29 MG/DL (ref 6–23)
CALCIUM SERPL-MCNC: 8.7 MG/DL (ref 8.6–10.6)
CHLORIDE SERPL-SCNC: 107 MMOL/L (ref 98–107)
CO2 SERPL-SCNC: 23 MMOL/L (ref 21–32)
CREAT SERPL-MCNC: 1.52 MG/DL (ref 0.5–1.3)
EGFRCR SERPLBLD CKD-EPI 2021: 48 ML/MIN/1.73M*2
EOSINOPHIL # BLD AUTO: 0.22 X10*3/UL (ref 0–0.4)
EOSINOPHIL NFR BLD AUTO: 2.6 %
ERYTHROCYTE [DISTWIDTH] IN BLOOD BY AUTOMATED COUNT: 14.3 % (ref 11.5–14.5)
ERYTHROCYTE [DISTWIDTH] IN BLOOD BY AUTOMATED COUNT: 14.3 % (ref 11.5–14.5)
GLUCOSE BLD MANUAL STRIP-MCNC: 136 MG/DL (ref 74–99)
GLUCOSE SERPL-MCNC: 130 MG/DL (ref 74–99)
HCT VFR BLD AUTO: 34.7 % (ref 41–52)
HCT VFR BLD AUTO: 34.7 % (ref 41–52)
HGB BLD-MCNC: 11.2 G/DL (ref 13.5–17.5)
HGB BLD-MCNC: 11.2 G/DL (ref 13.5–17.5)
IMM GRANULOCYTES # BLD AUTO: 0.12 X10*3/UL (ref 0–0.5)
IMM GRANULOCYTES NFR BLD AUTO: 1.4 % (ref 0–0.9)
LYMPHOCYTES # BLD AUTO: 1.02 X10*3/UL (ref 0.8–3)
LYMPHOCYTES NFR BLD AUTO: 12 %
MAGNESIUM SERPL-MCNC: 2.19 MG/DL (ref 1.6–2.4)
MCH RBC QN AUTO: 28.6 PG (ref 26–34)
MCH RBC QN AUTO: 28.6 PG (ref 26–34)
MCHC RBC AUTO-ENTMCNC: 32.3 G/DL (ref 32–36)
MCHC RBC AUTO-ENTMCNC: 32.3 G/DL (ref 32–36)
MCV RBC AUTO: 89 FL (ref 80–100)
MCV RBC AUTO: 89 FL (ref 80–100)
MONOCYTES # BLD AUTO: 0.72 X10*3/UL (ref 0.05–0.8)
MONOCYTES NFR BLD AUTO: 8.5 %
NEUTROPHILS # BLD AUTO: 6.41 X10*3/UL (ref 1.6–5.5)
NEUTROPHILS NFR BLD AUTO: 75.1 %
NRBC BLD-RTO: 0 /100 WBCS (ref 0–0)
NRBC BLD-RTO: 0 /100 WBCS (ref 0–0)
PHOSPHATE SERPL-MCNC: 3.7 MG/DL (ref 2.5–4.9)
PLATELET # BLD AUTO: 163 X10*3/UL (ref 150–450)
PLATELET # BLD AUTO: 163 X10*3/UL (ref 150–450)
POTASSIUM SERPL-SCNC: 4 MMOL/L (ref 3.5–5.3)
PROT SERPL-MCNC: 6.2 G/DL (ref 6.4–8.2)
RBC # BLD AUTO: 3.92 X10*6/UL (ref 4.5–5.9)
RBC # BLD AUTO: 3.92 X10*6/UL (ref 4.5–5.9)
SODIUM SERPL-SCNC: 142 MMOL/L (ref 136–145)
WBC # BLD AUTO: 8.6 X10*3/UL (ref 4.4–11.3)
WBC # BLD AUTO: 8.6 X10*3/UL (ref 4.4–11.3)

## 2024-06-27 PROCEDURE — 2500000001 HC RX 250 WO HCPCS SELF ADMINISTERED DRUGS (ALT 637 FOR MEDICARE OP): Performed by: STUDENT IN AN ORGANIZED HEALTH CARE EDUCATION/TRAINING PROGRAM

## 2024-06-27 PROCEDURE — 83735 ASSAY OF MAGNESIUM: CPT

## 2024-06-27 PROCEDURE — 2500000002 HC RX 250 W HCPCS SELF ADMINISTERED DRUGS (ALT 637 FOR MEDICARE OP, ALT 636 FOR OP/ED)

## 2024-06-27 PROCEDURE — 2500000001 HC RX 250 WO HCPCS SELF ADMINISTERED DRUGS (ALT 637 FOR MEDICARE OP): Performed by: NURSE PRACTITIONER

## 2024-06-27 PROCEDURE — 2500000001 HC RX 250 WO HCPCS SELF ADMINISTERED DRUGS (ALT 637 FOR MEDICARE OP)

## 2024-06-27 PROCEDURE — 80069 RENAL FUNCTION PANEL: CPT

## 2024-06-27 PROCEDURE — 2500000004 HC RX 250 GENERAL PHARMACY W/ HCPCS (ALT 636 FOR OP/ED): Performed by: NURSE PRACTITIONER

## 2024-06-27 PROCEDURE — 82947 ASSAY GLUCOSE BLOOD QUANT: CPT

## 2024-06-27 PROCEDURE — 85027 COMPLETE CBC AUTOMATED: CPT

## 2024-06-27 PROCEDURE — 2500000002 HC RX 250 W HCPCS SELF ADMINISTERED DRUGS (ALT 637 FOR MEDICARE OP, ALT 636 FOR OP/ED): Performed by: NURSE PRACTITIONER

## 2024-06-27 PROCEDURE — 82040 ASSAY OF SERUM ALBUMIN: CPT | Performed by: THORACIC SURGERY (CARDIOTHORACIC VASCULAR SURGERY)

## 2024-06-27 PROCEDURE — 85025 COMPLETE CBC W/AUTO DIFF WBC: CPT

## 2024-06-27 RX ORDER — LISINOPRIL 20 MG/1
20 TABLET ORAL DAILY
Status: DISCONTINUED | OUTPATIENT
Start: 2024-06-27 | End: 2024-06-27 | Stop reason: HOSPADM

## 2024-06-27 RX ORDER — LISINOPRIL 10 MG/1
10 TABLET ORAL DAILY
Status: DISCONTINUED | OUTPATIENT
Start: 2024-06-27 | End: 2024-06-27

## 2024-06-27 RX ORDER — POTASSIUM CHLORIDE 750 MG/1
10 TABLET, FILM COATED, EXTENDED RELEASE ORAL DAILY
Qty: 5 TABLET | Refills: 0 | Status: SHIPPED | OUTPATIENT
Start: 2024-06-27 | End: 2024-07-02

## 2024-06-27 RX ORDER — FUROSEMIDE 20 MG/1
20 TABLET ORAL DAILY
Qty: 5 TABLET | Refills: 0 | Status: SHIPPED | OUTPATIENT
Start: 2024-06-27 | End: 2024-07-02

## 2024-06-27 RX ORDER — LISINOPRIL 20 MG/1
20 TABLET ORAL DAILY
Qty: 30 TABLET | Refills: 0 | Status: SHIPPED | OUTPATIENT
Start: 2024-06-28 | End: 2024-07-28

## 2024-06-27 ASSESSMENT — COGNITIVE AND FUNCTIONAL STATUS - GENERAL
MOBILITY SCORE: 18
DRESSING REGULAR LOWER BODY CLOTHING: A LITTLE
DAILY ACTIVITIY SCORE: 19
MOVING TO AND FROM BED TO CHAIR: A LITTLE
PERSONAL GROOMING: A LITTLE
CLIMB 3 TO 5 STEPS WITH RAILING: A LOT
WALKING IN HOSPITAL ROOM: A LITTLE
STANDING UP FROM CHAIR USING ARMS: A LITTLE
TURNING FROM BACK TO SIDE WHILE IN FLAT BAD: A LITTLE
HELP NEEDED FOR BATHING: A LITTLE
TOILETING: A LITTLE
DRESSING REGULAR UPPER BODY CLOTHING: A LITTLE

## 2024-06-27 ASSESSMENT — PAIN - FUNCTIONAL ASSESSMENT: PAIN_FUNCTIONAL_ASSESSMENT: 0-10

## 2024-06-27 ASSESSMENT — PAIN SCALES - GENERAL: PAINLEVEL_OUTOF10: 0 - NO PAIN

## 2024-06-27 NOTE — SIGNIFICANT EVENT
Atrial and ventricular wires cut at skin level due to surgeon preference.  Patient instructed to notify radiology of retained epicardial wires prior to any MRI procedure, and to notify Dr Durant of any visible wires or s/s infection.

## 2024-06-27 NOTE — PROGRESS NOTES
"CARDIAC SURGERY DAILY PROGRESS NOTE    Umair Carney is a 74 year old male with PMHx of HTN, HLD, Prostate cancer s/p radiation in 2016, T2DM who presented to [facility] on 6/21/2024 for planned cardiac surgery.     Patient was referred to Dr. Durant for nonrheumatic aortic valve stenosis.      OPERATION/PROCEDURE: 6/21/2024 with Usman Durant   1. Median Sternotomy   2. Replacement Aortic Valve with XL Perceval Plus  3. ALEX clip with 35mm Atricure  4. CABG x3 LIMA-LAD, SVG-CX, SVG-PDA    CTICU Course: uneventful   Transfer to LT3: 6/24/2024  ===================    Interval History:   No acute events overnight.     SUBJECTIVE:  No complaints.     Objective   /84 (BP Location: Left arm, Patient Position: Lying)   Pulse 67   Temp 36 °C (96.8 °F) (Temporal)   Resp 18   Ht 1.854 m (6' 1\")   Wt 92.9 kg (204 lb 12.8 oz)   SpO2 97%   BMI 27.02 kg/m²   0-10 (Numeric) Pain Score: 0 - No pain   3 Day Weight Change: -2.555 kg (-5 lb 10.1 oz) per day    Intake and Output    Intake/Output Summary (Last 24 hours) at 6/27/2024 0840  Last data filed at 6/27/2024 0636  Gross per 24 hour   Intake 940 ml   Output 4100 ml   Net -3160 ml     Physical Exam  Vitals and nursing note reviewed.   Constitutional:       General: He is not in acute distress.     Appearance: Normal appearance. He is obese. He is not ill-appearing.      Comments: Patient alert and awake sitting up in chair in no acute distress.  Wife at bedside.    HENT:      Head: Normocephalic.      Nose: Nose normal.      Mouth/Throat:      Mouth: Mucous membranes are moist.   Eyes:      Conjunctiva/sclera: Conjunctivae normal.   Cardiovascular:      Rate and Rhythm: Normal rate and regular rhythm.      Pulses: Normal pulses.      Heart sounds: Normal heart sounds. No murmur heard.     No gallop.      Comments: Tele: SR 60's to 70's   +2 Equal and even pulses in all extremities   A/V Wires CUT 6/27.    Pulmonary:      Effort: Pulmonary effort is " normal. No respiratory distress.      Breath sounds: Normal breath sounds. No wheezing.      Comments: Equal and even chest expansion; thorax symmetric  Diminished breath sounds in bilateral bases   Good inspiratory effort on RA.   Incentive spirometer 2L   Sternum Stable    Abdominal:      General: Bowel sounds are normal. There is no distension.      Palpations: Abdomen is soft.      Tenderness: There is no abdominal tenderness.      Comments: (+) BM 6/23, 6/25  Appetite good.    Genitourinary:     Comments: 18F Coudet Valle catheter in place - (placed 6/26 for acute urinary retention)   Light caroline colored urine in valle bag   Musculoskeletal:      Cervical back: Neck supple.      Right lower leg: No edema.      Left lower leg: No edema.      Comments: PRESLEY; 5/5 strength in all extremities   Ambulates independently without assistance or a gait aide.    Skin:     General: Skin is warm and dry.      Capillary Refill: Capillary refill takes less than 2 seconds.      Coloration: Skin is not jaundiced or pale.      Comments: Mid-Sternal Incision: well approximated, no s/s of infection or bleeding, no crepitus - WALLY     Right SVG: well approximated, no s/s of infection or bleeding - WALLY        Neurological:      General: No focal deficit present.      Mental Status: He is alert and oriented to person, place, and time.   Psychiatric:         Mood and Affect: Mood normal.         Behavior: Behavior normal.       Medications  Scheduled medications  acetaminophen, 650 mg, oral, q6h  amLODIPine, 10 mg, oral, Daily  aspirin, 81 mg, oral, Daily  heparin (porcine), 5,000 Units, subcutaneous, q8h  insulin lispro, 0-5 Units, subcutaneous, TID  insulin lispro, 4 Units, subcutaneous, TID  lidocaine, 1 Application, urethral, Once  lisinopril, 20 mg, oral, Daily  metFORMIN, 1,000 mg, oral, BID  metoprolol tartrate, 25 mg, oral, BID  multivitamin with minerals, 1 tablet, oral, Daily  polyethylene glycol, 17 g, oral, BID  rosuvastatin,  40 mg, oral, Nightly  sennosides-docusate sodium, 2 tablet, oral, BID  tamsulosin, 0.8 mg, oral, Daily    Continuous medications   PRN medications  PRN medications: dextrose **OR** glucagon, naloxone, [DISCONTINUED] ondansetron **OR** ondansetron, oxygen, traMADol    LABS:  CMP:  Results from last 7 days   Lab Units 06/26/24  0718 06/25/24  0649 06/24/24  0718 06/23/24  0926 06/22/24  1455 06/22/24  0155 06/21/24  1505 06/21/24  0620   SODIUM mmol/L 140 139 139 135* 137 141 141 139   POTASSIUM mmol/L 3.7 3.8 3.6 4.1 4.1 4.3 3.3* 3.5   CHLORIDE mmol/L 108* 107 106 102 105 109* 107 104   CO2 mmol/L 22 22 23 22 20* 22 23 22   ANION GAP mmol/L 14 14 14 15 16 14 14 17   BUN mg/dL 29* 31* 35* 40* 37* 28* 24* 26*   CREATININE mg/dL 1.42* 1.42* 1.56* 2.08* 1.95* 1.76* 1.85* 1.42*   EGFR mL/min/1.73m*2 52* 52* 47* 33* 36* 40* 38* 52*   MAGNESIUM mg/dL 2.22 2.30 2.38 2.33 2.37 2.46* 2.61* 2.03   ALBUMIN g/dL 3.2*  3.2* 3.1*  3.1* 3.2*  3.2* 3.4 3.4  3.3* 3.6 3.5 4.0   ALT U/L 12 12 4*  --  10 13  --  21   AST U/L 17 24 15  --  19 21  --  15   BILIRUBIN TOTAL mg/dL 0.5 0.5 0.4  --  0.3 0.4  --  0.6     CBC:  Results from last 7 days   Lab Units 06/26/24  0718 06/25/24  0649 06/24/24  0718 06/23/24  0926 06/22/24  1455 06/22/24  0155 06/21/24  1505 06/21/24  0620   WBC AUTO x10*3/uL 8.0  8.0 7.9  7.9 9.6  9.6 13.2*  13.2* 14.1* 8.8 26.3* 7.2   HEMOGLOBIN g/dL 10.8*  10.8* 10.5*  10.5* 10.2*  10.2* 10.7*  10.7* 11.0* 11.6* 13.2* 15.2   HEMATOCRIT % 32.4*  32.4* 32.3*  32.3* 30.8*  30.8* 32.2*  32.2* 31.7* 32.4* 37.3* 43.3   PLATELETS AUTO x10*3/uL 126*  126* 99*  99* 78*  78* 86*  86* 110* 112* 202 183   MCV fL 86  86 87  87 86  86 89  89 84 82 83 82     COAG:   Results from last 7 days   Lab Units 06/25/24  0649 06/24/24  0718 06/23/24  0926 06/21/24  1505   INR  1.2* 1.2* 1.1 1.1     HEME/ENDO:     CARDIAC:   Results from last 7 days   Lab Units 06/22/24  0155 06/21/24  1552 06/21/24  0620   TROPHS ng/L  2,782* 2,066* 47        XR Chest 2 Views: 6/26/2024    FINDINGS:  PA and lateral radiographs of the chest were provided.  Additional PA  dual energy images were also provided.      Postsurgical changes of median sternotomy. Left atrial appendage  closure device. Cardiac valve replacement/repair.  CARDIOMEDIASTINAL SILHOUETTE:  Cardiomediastinal silhouette is stable in size and configuration.  LUNGS:  Left mid lung linear density consistent with an area of discoid  atelectasis.. Slight blunting of the right costophrenic angle. No  evidence of pneumothorax.  ABDOMEN:  No remarkable upper abdominal findings.   BONES:  No acute osseous changes.      IMPRESSION:  1.  Status post median sternotomy with medical devices as detailed  above.  2. Trace right pleural effusion with adjacent atelectasis. No  evidence of pneumothorax.        Signed by: Razia Craig 6/26/2024 10:17 AM  Dictation workstation:   [id number]    Transthoracic Echo (TTE) Complete with Contrast 6/24/2024    PHYSICIAN INTERPRETATION:  Left Ventricle: The left ventricular systolic function is mildly decreased, with a visually estimated ejection fraction of 45%. There is global hypokinesis of the left ventricle with minor regional variations. The left ventricular cavity size is normal. There is mild concentric left ventricular hypertrophy. Abnormal (paradoxical) septal motion consistent with post-operative status. Spectral Doppler shows a pseudonormal pattern of left ventricular diastolic filling.  Left Atrium: The left atrium is mildly dilated.  Right Ventricle: The right ventricle is mildly enlarged. There is mildly reduced right ventricular systolic function.  Right Atrium: The right atrium is mildly dilated.  Aortic Valve: There is a prosthetic aortic valve present. The aortic valve dimensionless index is 0.49. There is no evidence of aortic valve regurgitation. The peak instantaneous gradient of the aortic valve is 16.6 mmHg. The mean gradient of  the aortic valve is 9.0 mmHg.  Mitral Valve: The mitral valve is normal in structure. There is trace mitral valve regurgitation.  Tricuspid Valve: The tricuspid valve was not well visualized. There is trace tricuspid regurgitation.  Pulmonic Valve: The pulmonic valve is not well visualized. There is mild pulmonic valve regurgitation.  Pericardium: There is no pericardial effusion noted.  Aorta: The aortic root was not well visualized.     CONCLUSIONS:   1. The left ventricular systolic function is mildly decreased, with a visually estimated ejection fraction of 45%.   2. There is global hypokinesis of the left ventricle with minor regional variations.   3. Spectral Doppler shows a pseudonormal pattern of left ventricular diastolic filling.   4. Mildly enlarged right ventricle.   5. There is mildly reduced right ventricular systolic function.   6. Hemodynamics are consistent with normal functioning of the bioprosthetic aortic valve.   7. The left atrium is mildly dilated.   8. Abnormal septal motion consistent with post-operative status.     [id number] Mj Cody MD  Electronically signed on 2024 at 4:16:54 PM    ** Final **    IMPRESSION & PLAN:  POD # 6 s/p s/p AVR and CABGx3     - Increase activity/ ambulation; PT/OT  - Encourage IS, C/DB; respiratory therapy; on RA with SpO2 of 96%  - Cardiac rehab referral   - Continue cardiac meds: ASA, BB, statin   - Pain and anticonstipation meds  - Chest tube removed on   - 2V CXR completed on 2024.   - Post op echo completed on  and showed an LVEF of 45%  - : Epicardial wires CUT prior to discharge; per surgeon preference.    - Tele until discharge    Rhythm  - Tele: SR 60's to 70's   - Continue BB: metoprolol tartrate 25mg BID   - Adjust medications as tolerated    Hypertension: home meds: norvasc 10 mg daily, hydralazne 100 mg TID, lisinopril-hydrochlorothiazide 20-25 mg daily, metoprolol succinate XL 50 daily  Systolic (24hrs), Av , Min:105 ,  Max:174   - Continue BB  - Continue Lisinopril 20mg   - Additional antihypertensives as needed    Hyperlipidemia: home Crestor 20 mg daily, Tricor 145 mg daily  Lab Results   Component Value Date    CHOL 102 06/26/2024    HDL 24.7 06/26/2024    VLDL 54 (H) 06/26/2024    TRIG 269 (H) 06/26/2024    NHDL 77 06/26/2024      - Continue high intensity statin: Crestor 40mg nightly   - Follow up lipid panel with PCP/ cardiologist for ongoing lipid management    Diabetes Mellitus Type 2: home meds: Amaryl 2 mg BID; Jardiance 25 mg daily, Metformin 1,000 mg BID  Lab Results   Component Value Date    HGBA1C 6.7 (H) 06/05/2024     Results from last 7 days   Lab Units 06/27/24  0740 06/26/24 2010 06/26/24  1615 06/26/24  1155 06/26/24  0739 06/25/24 2013 06/25/24  1619   POCT GLUCOSE mg/dL 136* 139* 120* 198* 162* 151* 132*     - Continue Lispro premeal corrective scale: SSI #2  - Continue home metformin 1,00mg BID   - 6/26: Stopped Jardiance due to valle being replaced and likely being discharged with valle   SGLT2i tx may not be utilized in inpt setting unless the following conditions are met. Only HF Cardiology may initiate inpatient SGLT2i use.   1) pt is eating and drinking by mouth with a total daily carb intake exceeding 60g of CHO  2) pt is urinating independently of urinary catheters  3) pt can ambulate freely to the restroom in order to maintain personal hygiene  4) no current concern for UTI/genital or inguinal candidiasis  5) no plans for NPO within the next 48-72hr     - Accuchecks premeal and at bedtime  - Diabetic diet  - endocrine following appreciate recs  Discharge Recommendations on 6/24/24 - Home Diabetes regimen:     Metformin 1000mg orally BID  Rybelsius - Outpatient follow up consideration for Ozempic in the light of cardioprotection as well and predictability  -- unable to discharge patient on Jardiance due to valle replaced with acute urinary retention       Acute Blood Loss Anemia   Recent Labs      06/26/24  0718 06/25/24  0649 06/24/24  0718 06/23/24  0926 06/22/24  1455 06/22/24  0155 06/21/24  1505   HGB 10.8*  10.8* 10.5*  10.5* 10.2*  10.2* 10.7*  10.7* 11.0* 11.6* 13.2*   HCT 32.4*  32.4* 32.3*  32.3* 30.8*  30.8* 32.2*  32.2* 31.7* 32.4* 37.3*   - MV x1mo  - Daily labs, transfuse as indicated    Thrombocytopenia  Recent Labs     06/26/24  0718 06/25/24  0649 06/24/24  0718 06/23/24  0926 06/22/24  1455 06/22/24  0155 06/21/24  1505   *  126* 99*  99* 78*  78* 86*  86* 110* 112* 202   - Etiology likely postop/CPB related  - Trend with daily CBCs    Hx of prostate cancer and chemotherapy w/ hematuria   BPH:   - hx of urinary retention and hx of having to have catheter replaced s/p previous knee surgery   - continue tamsulosin: 6/26 Increased dose to 0.8mg daily   - 6/26: Acute urinary retention; 900mL on bladder scan; straight cath x1   ---Urinary Retention in afternoon after TOV; 700mL on bladder scan   --- 18F Coudet Valle Catheter placed  - Will Discharge patient with valle   - Follow up with Voiding trial as an outpatient with urology: Dr. Yasmany Sosa     Chronic Kidney Disease Stage III: baseline SCr 1.5     Creatinine   Date Value Ref Range Status   06/26/2024 1.42 (H) 0.50 - 1.30 mg/dL Final   06/25/2024 1.42 (H) 0.50 - 1.30 mg/dL Final     Volume/Electrolyte Status: Preop wt Weight: 95.1 kg (209 lb 10.5 oz)   Vitals:    06/27/24 0722   Weight: 92.9 kg (204 lb 12.8 oz)   - Weight: 90.2, 90.7, 96.7, 97.8, 98.4  - Adjust diuresis for postop cardiac surgery hypervolemia  - 6/26: Lasix 40mg IV x2; replaced K & mg   - Will discharge patient on short course of PO Lasix   - Replete electrolytes for hypokalemia/hypomagnesemia  - renally dose all medications/avoid nephrotoxic drugs  - Daily weights/strict I&Os  - Daily RFP       VTE Prophylaxis: SCDs/TEDs, ambulation, SQ heparin  Code Status: Full Code    Dispo  - PT/OT recs Low intensity home   - Would benefit from homecare for cardiac  surgery carepath and RN visits  - Anticipate discharge to home today 6/27 to home with valle in place; will follow up with urology as an outpatient     LEVI Torres-CNP  Cardiac Surgery JACKEL[facility]  [facility]  Team Phone [phone]    6/27/2024  8:40 AM

## 2024-06-27 NOTE — PROGRESS NOTES
[facility] was notified of patient's discharge today. Discharge documents , home health referral sent via [facility]. Given wife's number to contact.  Bernadine Short RN

## 2024-06-28 ENCOUNTER — HOSPITAL ENCOUNTER (EMERGENCY)
Age: 74
Discharge: HOME | End: 2024-06-28
Attending: EMERGENCY MEDICINE
Payer: MEDICARE

## 2024-06-28 VITALS
DIASTOLIC BLOOD PRESSURE: 84 MMHG | WEIGHT: 210 LBS | HEART RATE: 85 BPM | SYSTOLIC BLOOD PRESSURE: 165 MMHG | TEMPERATURE: 98 F | BODY MASS INDEX: 27.71 KG/M2 | OXYGEN SATURATION: 97 % | RESPIRATION RATE: 18 BRPM

## 2024-06-28 DIAGNOSIS — R33.8 ACUTE URINARY RETENTION: Primary | ICD-10-CM

## 2024-06-28 LAB
ANION GAP SERPL CALCULATED.3IONS-SCNC: 12 MMOL/L (ref 7–16)
BACTERIA URNS QL MICRO: ABNORMAL
BASOPHILS # BLD: 0.04 K/UL (ref 0–0.2)
BASOPHILS NFR BLD: 0 % (ref 0–2)
BILIRUB UR QL STRIP: NEGATIVE
BUN SERPL-MCNC: 36 MG/DL (ref 6–23)
CALCIUM SERPL-MCNC: 8.7 MG/DL (ref 8.6–10.2)
CHLORIDE SERPL-SCNC: 106 MMOL/L (ref 98–107)
CLARITY UR: CLEAR
CO2 SERPL-SCNC: 22 MMOL/L (ref 22–29)
COLOR UR: YELLOW
CREAT SERPL-MCNC: 1.6 MG/DL (ref 0.7–1.2)
EOSINOPHIL # BLD: 0.28 K/UL (ref 0.05–0.5)
EOSINOPHILS RELATIVE PERCENT: 3 % (ref 0–6)
EPI CELLS #/AREA URNS HPF: ABNORMAL /HPF
ERYTHROCYTE [DISTWIDTH] IN BLOOD BY AUTOMATED COUNT: 14.2 % (ref 11.5–15)
GFR, ESTIMATED: 44 ML/MIN/1.73M2
GLUCOSE SERPL-MCNC: 222 MG/DL (ref 74–99)
GLUCOSE UR STRIP-MCNC: 500 MG/DL
HCT VFR BLD AUTO: 33.3 % (ref 37–54)
HGB BLD-MCNC: 10.9 G/DL (ref 12.5–16.5)
HGB UR QL STRIP.AUTO: ABNORMAL
IMM GRANULOCYTES # BLD AUTO: 0.14 K/UL (ref 0–0.58)
IMM GRANULOCYTES NFR BLD: 2 % (ref 0–5)
KETONES UR STRIP-MCNC: NEGATIVE MG/DL
LEUKOCYTE ESTERASE UR QL STRIP: NEGATIVE
LYMPHOCYTES NFR BLD: 1.14 K/UL (ref 1.5–4)
LYMPHOCYTES RELATIVE PERCENT: 13 % (ref 20–42)
MCH RBC QN AUTO: 28.5 PG (ref 26–35)
MCHC RBC AUTO-ENTMCNC: 32.7 G/DL (ref 32–34.5)
MCV RBC AUTO: 86.9 FL (ref 80–99.9)
MONOCYTES NFR BLD: 0.67 K/UL (ref 0.1–0.95)
MONOCYTES NFR BLD: 7 % (ref 2–12)
NEUTROPHILS NFR BLD: 75 % (ref 43–80)
NEUTS SEG NFR BLD: 6.82 K/UL (ref 1.8–7.3)
NITRITE UR QL STRIP: NEGATIVE
PH UR STRIP: 5.5 [PH] (ref 5–9)
PLATELET # BLD AUTO: 180 K/UL (ref 130–450)
PMV BLD AUTO: 10 FL (ref 7–12)
POTASSIUM SERPL-SCNC: 4.1 MMOL/L (ref 3.5–5)
PROT UR STRIP-MCNC: 100 MG/DL
RBC # BLD AUTO: 3.83 M/UL (ref 3.8–5.8)
RBC #/AREA URNS HPF: ABNORMAL /HPF
SODIUM SERPL-SCNC: 140 MMOL/L (ref 132–146)
SP GR UR STRIP: 1.02 (ref 1–1.03)
UROBILINOGEN UR STRIP-ACNC: 0.2 EU/DL (ref 0–1)
WBC #/AREA URNS HPF: ABNORMAL /HPF
WBC OTHER # BLD: 9.1 K/UL (ref 4.5–11.5)

## 2024-06-28 PROCEDURE — 80048 BASIC METABOLIC PNL TOTAL CA: CPT

## 2024-06-28 PROCEDURE — 81001 URINALYSIS AUTO W/SCOPE: CPT

## 2024-06-28 PROCEDURE — 85025 COMPLETE CBC W/AUTO DIFF WBC: CPT

## 2024-06-28 PROCEDURE — 99283 EMERGENCY DEPT VISIT LOW MDM: CPT

## 2024-06-28 ASSESSMENT — LIFESTYLE VARIABLES: HOW OFTEN DO YOU HAVE A DRINK CONTAINING ALCOHOL: NEVER

## 2024-06-28 NOTE — ED NOTES
Barron irrrigated with 100cc nss/ return of 1000cc fernando colored urine/ dark clot small in  size noted / no further bleeding into urine noted

## 2024-06-28 NOTE — ED PROVIDER NOTES
ED PROVIDER NOTE    Chief Complaint   Patient presents with    Dysuria     No urine in ceballos cath bag since 0200 today, no abd pain,        HPI:  6/28/24,   Time: 1:03 PM EDT       Leland Regalado is a 74 y.o. male presenting to the ED for urinary retention.  Acute onset this morning around 2 AM.  Patient had CABG surgery at [facility] 5 days ago.  He had Ceballos catheter placed yesterday prior to being discharged from the hospital.  He had postoperative urinary retention.  Not on anticoagulants.  He emptied his Ceballos catheter leg bag at midnight and has not had any urine output since then.  Denies associated fever, chills, nausea, vomiting, abdominal pain, back pain, flank pain.  Normal p.o. intake.  No chest pain or shortness of breath.    Chart review: hx of prostat ca s/p radiation, DM, HTN, HLD, CAD s/p CABG    Reviewed inpatient cardiac surgery discharge summary from 6/27/2024 by Dr. Muñoz:  Patient was admitted for aortic stenosis status post aortic valve replacement, left atrial appendage clip, and CABG x 3.  Postop course uneventful.    Review of Systems:     Review of Systems  Pertinent positives and negatives as stated in HPI     --------------------------------------------- PAST HISTORY ---------------------------------------------  Past Medical History:   Past Medical History:   Diagnosis Date    Arthritis     osteoarthritis, right knee, for or 12/2/2016    Bloodshot eye 08/30/2016    left / patient states rubbed eye last week / no drainage / patient denies any discomfort or s/s    Cancer (HCC) 2011    prostate ca  / treated with radiation    Cancer of prostate (HCC) 9/9/2016 2011 ; EXT BEAM RADIATION    Diabetes mellitus (HCC)     Essential hypertension 9/9/2016    High blood triglycerides 9/9/2016    Hyperlipidemia     Hypertension     Hypokalemia 9/9/2016    Radiation induced proctitis 09/09/2016    resolved    Type 2 diabetes mellitus (HCC) 9/9/2016    Started age 57 ; insulin

## 2024-06-28 NOTE — ED NOTES
Department of Emergency Medicine  FIRST PROVIDER TRIAGE NOTE             Independent MLP           6/28/24  12:03 PM EDT    Date of Encounter: 6/28/24   MRN: [id number]      HPI: Leland Regalado is a 74 y.o. male who presents to the ED for Knee Pain and Dysuria (No urine in ceballos cath bag since 0200 today, no abd pain, )   Patient comes in with complaint of no drainage through the Ceballos catheter that started around 2 AM.  Patient was discharged yesterday after open heart surgery.  He states that he had difficulty placing the Ceballos yesterday.    ROS: Negative for Suicidal ideation or Homicidal Ideation.    PE: Gen Appearance/Constitutional: alert  CV: regular rate  Pulm: CTA bilat     Initial Plan of Care: All treatment areas with department are currently occupied. Plan to order/Initiate the following while awaiting opening in ED: Irrigate and assess fully.  Initiate Treatment-Testing, Proceed toTreatment Area When Bed Available for ED Attending/MLP to Continue Care    Electronically signed by MING Skelton   DD: 6/28/24

## 2024-07-02 LAB
ALBUMIN: 3.3 G/DL (ref 3.5–5.2)
ANION GAP SERPL CALCULATED.3IONS-SCNC: 15 MMOL/L (ref 7–16)
BASOPHILS # BLD: 0.06 K/UL (ref 0–0.2)
BASOPHILS NFR BLD: 1 % (ref 0–2)
BUN SERPL-MCNC: 30 MG/DL (ref 6–23)
CALCIUM SERPL-MCNC: 8.5 MG/DL (ref 8.6–10.2)
CHLORIDE SERPL-SCNC: 106 MMOL/L (ref 98–107)
CO2 SERPL-SCNC: 21 MMOL/L (ref 22–29)
CREAT SERPL-MCNC: 1.6 MG/DL (ref 0.7–1.2)
EOSINOPHIL # BLD: 0.32 K/UL (ref 0.05–0.5)
EOSINOPHILS RELATIVE PERCENT: 4 % (ref 0–6)
ERYTHROCYTE [DISTWIDTH] IN BLOOD BY AUTOMATED COUNT: 14.6 % (ref 11.5–15)
GFR, ESTIMATED: 47 ML/MIN/1.73M2
GLUCOSE SERPL-MCNC: 241 MG/DL (ref 74–99)
HCT VFR BLD AUTO: 34.2 % (ref 37–54)
HGB BLD-MCNC: 10.7 G/DL (ref 12.5–16.5)
IMM GRANULOCYTES # BLD AUTO: 0.16 K/UL (ref 0–0.58)
IMM GRANULOCYTES NFR BLD: 2 % (ref 0–5)
LYMPHOCYTES NFR BLD: 0.95 K/UL (ref 1.5–4)
LYMPHOCYTES RELATIVE PERCENT: 11 % (ref 20–42)
MAGNESIUM SERPL-MCNC: 2 MG/DL (ref 1.6–2.6)
MCH RBC QN AUTO: 28.5 PG (ref 26–35)
MCHC RBC AUTO-ENTMCNC: 31.3 G/DL (ref 32–34.5)
MCV RBC AUTO: 91 FL (ref 80–99.9)
MONOCYTES NFR BLD: 0.55 K/UL (ref 0.1–0.95)
MONOCYTES NFR BLD: 6 % (ref 2–12)
NEUTROPHILS NFR BLD: 77 % (ref 43–80)
NEUTS SEG NFR BLD: 6.97 K/UL (ref 1.8–7.3)
PHOSPHATE SERPL-MCNC: 2.9 MG/DL (ref 2.5–4.5)
PLATELET # BLD AUTO: 217 K/UL (ref 130–450)
PMV BLD AUTO: 10.3 FL (ref 7–12)
POTASSIUM SERPL-SCNC: 4 MMOL/L (ref 3.5–5)
RBC # BLD AUTO: 3.76 M/UL (ref 3.8–5.8)
SODIUM SERPL-SCNC: 142 MMOL/L (ref 132–146)
WBC OTHER # BLD: 9 K/UL (ref 4.5–11.5)

## 2024-07-08 LAB
ATRIAL RATE: 78 BPM
P AXIS: 20 DEGREES
PR INTERVAL: 176 MS
Q ONSET: 211 MS
QRS COUNT: 13 BEATS
QRS DURATION: 94 MS
QT INTERVAL: 388 MS
QTC CALCULATION(BAZETT): 442 MS
QTC FREDERICIA: 423 MS
R AXIS: -39 DEGREES
T AXIS: -38 DEGREES
T OFFSET: 405 MS
VENTRICULAR RATE: 78 BPM

## 2024-07-09 DIAGNOSIS — Z95.2 S/P AVR: ICD-10-CM

## 2024-07-10 LAB
ALBUMIN: 3.7 G/DL (ref 3.5–5.2)
ANION GAP SERPL CALCULATED.3IONS-SCNC: 12 MMOL/L (ref 7–16)
BASOPHILS # BLD: 0.04 K/UL (ref 0–0.2)
BASOPHILS NFR BLD: 1 % (ref 0–2)
BUN SERPL-MCNC: 31 MG/DL (ref 6–23)
CALCIUM SERPL-MCNC: 8.6 MG/DL (ref 8.6–10.2)
CHLORIDE SERPL-SCNC: 109 MMOL/L (ref 98–107)
CO2 SERPL-SCNC: 23 MMOL/L (ref 22–29)
CREAT SERPL-MCNC: 1.6 MG/DL (ref 0.7–1.2)
EOSINOPHIL # BLD: 0.15 K/UL (ref 0.05–0.5)
EOSINOPHILS RELATIVE PERCENT: 3 % (ref 0–6)
ERYTHROCYTE [DISTWIDTH] IN BLOOD BY AUTOMATED COUNT: 14.2 % (ref 11.5–15)
GFR, ESTIMATED: 46 ML/MIN/1.73M2
GLUCOSE SERPL-MCNC: 176 MG/DL (ref 74–99)
HCT VFR BLD AUTO: 34.4 % (ref 37–54)
HGB BLD-MCNC: 11 G/DL (ref 12.5–16.5)
IMM GRANULOCYTES # BLD AUTO: 0.04 K/UL (ref 0–0.58)
IMM GRANULOCYTES NFR BLD: 1 % (ref 0–5)
LYMPHOCYTES NFR BLD: 0.62 K/UL (ref 1.5–4)
LYMPHOCYTES RELATIVE PERCENT: 11 % (ref 20–42)
MAGNESIUM SERPL-MCNC: 2.1 MG/DL (ref 1.6–2.6)
MCH RBC QN AUTO: 27.8 PG (ref 26–35)
MCHC RBC AUTO-ENTMCNC: 32 G/DL (ref 32–34.5)
MCV RBC AUTO: 87.1 FL (ref 80–99.9)
MONOCYTES NFR BLD: 0.54 K/UL (ref 0.1–0.95)
MONOCYTES NFR BLD: 10 % (ref 2–12)
NEUTROPHILS NFR BLD: 75 % (ref 43–80)
NEUTS SEG NFR BLD: 4.18 K/UL (ref 1.8–7.3)
PHOSPHATE SERPL-MCNC: 2.9 MG/DL (ref 2.5–4.5)
PLATELET # BLD AUTO: 165 K/UL (ref 130–450)
PMV BLD AUTO: 10.6 FL (ref 7–12)
POTASSIUM SERPL-SCNC: 4.1 MMOL/L (ref 3.5–5)
RBC # BLD AUTO: 3.95 M/UL (ref 3.8–5.8)
SODIUM SERPL-SCNC: 144 MMOL/L (ref 132–146)
WBC OTHER # BLD: 5.6 K/UL (ref 4.5–11.5)

## 2024-07-16 LAB
LABORATORY COMMENT REPORT: NORMAL
PATH REPORT.FINAL DX SPEC: NORMAL
PATH REPORT.GROSS SPEC: NORMAL
PATH REPORT.RELEVANT HX SPEC: NORMAL
PATH REPORT.TOTAL CANCER: NORMAL

## 2024-07-22 DIAGNOSIS — Z95.1 STATUS POST CORONARY ARTERY BYPASS GRAFTING: ICD-10-CM

## 2024-07-24 ENCOUNTER — HOSPITAL ENCOUNTER (OUTPATIENT)
Dept: RADIOLOGY | Facility: HOSPITAL | Age: 74
Discharge: HOME | End: 2024-07-24
Payer: MEDICARE

## 2024-07-24 ENCOUNTER — DOCUMENTATION (OUTPATIENT)
Dept: RESEARCH | Age: 74
End: 2024-07-24

## 2024-07-24 ENCOUNTER — OFFICE VISIT (OUTPATIENT)
Dept: CARDIAC SURGERY | Facility: HOSPITAL | Age: 74
End: 2024-07-24
Payer: MEDICARE

## 2024-07-24 ENCOUNTER — LAB (OUTPATIENT)
Dept: LAB | Facility: LAB | Age: 74
End: 2024-07-24
Payer: MEDICARE

## 2024-07-24 VITALS
HEART RATE: 67 BPM | OXYGEN SATURATION: 98 % | SYSTOLIC BLOOD PRESSURE: 112 MMHG | WEIGHT: 192 LBS | BODY MASS INDEX: 25.33 KG/M2 | DIASTOLIC BLOOD PRESSURE: 73 MMHG

## 2024-07-24 DIAGNOSIS — Z95.2 S/P AVR (AORTIC VALVE REPLACEMENT): ICD-10-CM

## 2024-07-24 DIAGNOSIS — Z95.2 STATUS POST AORTIC VALVE REPLACEMENT: ICD-10-CM

## 2024-07-24 DIAGNOSIS — I25.10 CORONARY ARTERY ARTERIOSCLEROSIS: ICD-10-CM

## 2024-07-24 DIAGNOSIS — Z95.1 S/P CABG (CORONARY ARTERY BYPASS GRAFT): ICD-10-CM

## 2024-07-24 DIAGNOSIS — Z95.1 STATUS POST CORONARY ARTERY BYPASS GRAFTING: ICD-10-CM

## 2024-07-24 DIAGNOSIS — Z95.2 S/P AVR: ICD-10-CM

## 2024-07-24 LAB
ALBUMIN SERPL BCP-MCNC: 3.8 G/DL (ref 3.4–5)
ALP SERPL-CCNC: 67 U/L (ref 33–136)
ALT SERPL W P-5'-P-CCNC: 13 U/L (ref 10–52)
ANION GAP SERPL CALC-SCNC: 14 MMOL/L (ref 10–20)
AST SERPL W P-5'-P-CCNC: 17 U/L (ref 9–39)
BASOPHILS # BLD AUTO: 0.06 X10*3/UL (ref 0–0.1)
BASOPHILS NFR BLD AUTO: 0.9 %
BILIRUB SERPL-MCNC: 0.4 MG/DL (ref 0–1.2)
BUN SERPL-MCNC: 43 MG/DL (ref 6–23)
CALCIUM SERPL-MCNC: 9.3 MG/DL (ref 8.6–10.6)
CHLORIDE SERPL-SCNC: 105 MMOL/L (ref 98–107)
CO2 SERPL-SCNC: 25 MMOL/L (ref 21–32)
CREAT SERPL-MCNC: 1.73 MG/DL (ref 0.5–1.3)
EGFRCR SERPLBLD CKD-EPI 2021: 41 ML/MIN/1.73M*2
EOSINOPHIL # BLD AUTO: 0.3 X10*3/UL (ref 0–0.4)
EOSINOPHIL NFR BLD AUTO: 4.3 %
ERYTHROCYTE [DISTWIDTH] IN BLOOD BY AUTOMATED COUNT: 14.1 % (ref 11.5–14.5)
GLUCOSE SERPL-MCNC: 123 MG/DL (ref 74–99)
HCT VFR BLD AUTO: 34.5 % (ref 41–52)
HGB BLD-MCNC: 10.6 G/DL (ref 13.5–17.5)
IMM GRANULOCYTES # BLD AUTO: 0.08 X10*3/UL (ref 0–0.5)
IMM GRANULOCYTES NFR BLD AUTO: 1.1 % (ref 0–0.9)
LYMPHOCYTES # BLD AUTO: 1.38 X10*3/UL (ref 0.8–3)
LYMPHOCYTES NFR BLD AUTO: 19.6 %
MAGNESIUM SERPL-MCNC: 2.02 MG/DL (ref 1.6–2.4)
MCH RBC QN AUTO: 26.3 PG (ref 26–34)
MCHC RBC AUTO-ENTMCNC: 30.7 G/DL (ref 32–36)
MCV RBC AUTO: 86 FL (ref 80–100)
MONOCYTES # BLD AUTO: 0.57 X10*3/UL (ref 0.05–0.8)
MONOCYTES NFR BLD AUTO: 8.1 %
NEUTROPHILS # BLD AUTO: 4.65 X10*3/UL (ref 1.6–5.5)
NEUTROPHILS NFR BLD AUTO: 66 %
NRBC BLD-RTO: 0 /100 WBCS (ref 0–0)
PHOSPHATE SERPL-MCNC: 3.9 MG/DL (ref 2.5–4.9)
PLATELET # BLD AUTO: 317 X10*3/UL (ref 150–450)
POTASSIUM SERPL-SCNC: 3.9 MMOL/L (ref 3.5–5.3)
PROT SERPL-MCNC: 6.4 G/DL (ref 6.4–8.2)
RBC # BLD AUTO: 4.03 X10*6/UL (ref 4.5–5.9)
SODIUM SERPL-SCNC: 140 MMOL/L (ref 136–145)
WBC # BLD AUTO: 7 X10*3/UL (ref 4.4–11.3)

## 2024-07-24 PROCEDURE — 71046 X-RAY EXAM CHEST 2 VIEWS: CPT

## 2024-07-24 PROCEDURE — 99211 OFF/OP EST MAY X REQ PHY/QHP: CPT | Performed by: THORACIC SURGERY (CARDIOTHORACIC VASCULAR SURGERY)

## 2024-07-24 PROCEDURE — 84100 ASSAY OF PHOSPHORUS: CPT

## 2024-07-24 PROCEDURE — 85025 COMPLETE CBC W/AUTO DIFF WBC: CPT

## 2024-07-24 PROCEDURE — 80053 COMPREHEN METABOLIC PANEL: CPT

## 2024-07-24 PROCEDURE — 83735 ASSAY OF MAGNESIUM: CPT

## 2024-07-24 RX ORDER — LISINOPRIL AND HYDROCHLOROTHIAZIDE 20; 25 MG/1; MG/1
1 TABLET ORAL DAILY
COMMUNITY

## 2024-07-24 ASSESSMENT — PAIN SCALES - GENERAL: PAINLEVEL: 0-NO PAIN

## 2024-07-24 ASSESSMENT — ENCOUNTER SYMPTOMS
OCCASIONAL FEELINGS OF UNSTEADINESS: 0
DEPRESSION: 0
LOSS OF SENSATION IN FEET: 0

## 2024-07-24 NOTE — PROGRESS NOTES
Chief Complaint  POST OP Evaluation    HPI:   Mr. Umair Carney is a 74 y.o. male, who presents for post-operative evaluation.   He is now 1 month out from his operation, and is recovering nicely.  He has resumed normal activities and his appetite is returned to normal.  He has no chest pain, no shortness of breath and denies palpitations, dizziness, or syncope.  He was discharged with a Dunn catheter in place since he could not have a normal voiding episode.  He was actually readmitted because the Dunn was taken out couple days later and he had a urinary retention so had to be readmitted and the Dunn was put back and it has been in since then.  He finished his antibiotic today.  I have called his urologist office in [place] to try to get him back on this 's schedule so they can take the Dunn catheter the sooner the better.  I am concerned about getting a new infection bacteremia and endocarditis.    Past Medical History:   Diagnosis Date    Coronary artery disease     Hyperlipidemia     Hypertension     Prostate cancer (Multi)     s/p radiation    Skin cancer     removed    Type 2 diabetes mellitus (Multi)        Past Surgical History:   Procedure Laterality Date    CARDIAC CATHETERIZATION      CATARACT EXTRACTION Bilateral     COLONOSCOPY      KNEE ARTHROPLASTY Bilateral 2016       Family History   Problem Relation Name Age of Onset    Heart disease Mother      Prostate cancer Father         Social History     Socioeconomic History    Marital status:      Spouse name: Not on file    Number of children: Not on file    Years of education: Not on file    Highest education level: Not on file   Occupational History    Not on file   Tobacco Use    Smoking status: Never    Smokeless tobacco: Never   Vaping Use    Vaping status: Never Used   Substance and Sexual Activity    Alcohol use: Not Currently     Comment: approximately one per week    Drug use: Never    Sexual activity: Defer   Other Topics  Concern    Not on file   Social History Narrative    Not on file     Social Determinants of Health     Financial Resource Strain: Low Risk  (6/23/2024)    Overall Financial Resource Strain (CARDIA)     Difficulty of Paying Living Expenses: Not hard at all   Food Insecurity: Not on file   Transportation Needs: No Transportation Needs (6/23/2024)    PRAPARE - Transportation     Lack of Transportation (Medical): No     Lack of Transportation (Non-Medical): No   Physical Activity: Not on file   Stress: Not on file   Social Connections: Not on file   Intimate Partner Violence: Not on file   Housing Stability: Low Risk  (6/23/2024)    Housing Stability Vital Sign     Unable to Pay for Housing in the Last Year: No     Number of Places Lived in the Last Year: 1     Unstable Housing in the Last Year: No       No Known Allergies    Outpatient Encounter Medications as of 7/24/2024   Medication Sig Dispense Refill    acetaminophen (Tylenol) 325 mg tablet Take 2 tablets (650 mg) by mouth every 6 hours if needed for mild pain (1 - 3), headaches or moderate pain (4 - 6).      amLODIPine (Norvasc) 10 mg tablet Take 1 tablet (10 mg) by mouth once daily.      aspirin 81 mg EC tablet Take 1 tablet (81 mg) by mouth once daily.      cholecalciferol (Vitamin D-3) 50 mcg (2,000 unit) capsule Take 1 capsule (50 mcg) by mouth early in the morning..      cyanocobalamin (Vitamin B-12) 1,000 mcg tablet Take 1 tablet (1,000 mcg) by mouth once daily.      fenofibrate (Tricor) 145 mg tablet Take 1 tablet (145 mg) by mouth once daily.      icosapent ethyL (Vascepa) 1 gram capsule TAKE TWO CAPSULES BY MOUTH TWO TIMES A DAY AFTER MEALS      lisinopriL-hydrochlorothiazide 20-25 mg tablet Take 1 tablet by mouth once daily.      metFORMIN (Glucophage) 1,000 mg tablet TAKE ONE TABLET BY MOUTH AFTER LUNCH AND ONE TABLET AFTER DINNER      metoprolol tartrate (Lopressor) 25 mg tablet Take 1 tablet (25 mg) by mouth 2 times a day. 60 tablet 0    multivitamin  with minerals tablet Take 1 tablet by mouth once daily.      polyethylene glycol (Glycolax, Miralax) 17 gram packet Take 17 g by mouth once daily as needed (constipation).      rosuvastatin (Crestor) 40 mg tablet Take 1 tablet (40 mg) by mouth once daily at bedtime. 30 tablet 0    Rybelsus 14 mg tablet tablet TAKE 1 TABLET BY MOUTH ONCE DAILY ON AN EMPTY STOMACH. DO NOT EAT OR DRINK ANYTHING AFTER FOR 30 MINUTES      tamsulosin (Flomax) 0.4 mg 24 hr capsule Take 2 capsules (0.8 mg) by mouth once daily. 60 capsule 0    furosemide (Lasix) 20 mg tablet Take 1 tablet (20 mg) by mouth once daily for 5 days. Please take for 5 days and then stop. 5 tablet 0    lisinopril 20 mg tablet Take 1 tablet (20 mg) by mouth once daily. (Patient not taking: Reported on 7/24/2024) 30 tablet 0     No facility-administered encounter medications on file as of 7/24/2024.       Physical Exam    Encounter Date: 06/21/24   ECG 12 Lead   Result Value    Ventricular Rate 78    Atrial Rate 78    MO Interval 176    QRS Duration 94    QT Interval 388    QTC Calculation(Bazett) 442    P Axis 20    R Axis -39    T Axis -38    QRS Count 13    Q Onset 211    T Offset 405    QTC Fredericia 423    Narrative    Normal sinus rhythm  Left axis deviation  Nonspecific T wave abnormality  Abnormal ECG  No previous ECGs available  Confirmed by Wally Richards ([id number]) on 7/8/2024 8:46:41 AM       Assessment and Plan:    Mr. Umair Carney is a 74 y.o. male, who is recovering well after surgery.  I have released them from sternal precautions and referred them for cardiopulmonary rehab.  I have encouraged them to slowly return to normal activities, but refrain from heavy lifting for a full 12 weeks after surgery.  He will continue cardiovascular management with their cardiologist.

## 2024-07-24 NOTE — RESEARCH NOTES
Mr Carney was here today for 30 day post op follow up.  Questionnaire and labs completed per study protocol.  He had a visit to the ED since hospital discharge due to hematuria which has now resolved. Next study visit at 45 days post op.

## 2024-07-25 DIAGNOSIS — Z95.2 STATUS POST AORTIC VALVE REPLACEMENT: ICD-10-CM

## 2024-08-05 ENCOUNTER — TELEPHONE (OUTPATIENT)
Dept: RESEARCH | Age: 74
End: 2024-08-05
Payer: MEDICARE

## 2024-08-05 DIAGNOSIS — Z00.6 RESEARCH STUDY PATIENT: ICD-10-CM

## 2024-08-05 DIAGNOSIS — Z95.2 S/P AVR (AORTIC VALVE REPLACEMENT): ICD-10-CM

## 2024-08-05 NOTE — RESEARCH NOTES
Phone call made with Mr Carney for 45 day follow up phone call for Renibus Study. He is doing well. Had urology follow up and urine catheter has now been removed over a week ago and has had no episodes of urine retention.  Next follow up scheduled for August 23rd for the 60 day follow up.

## 2024-08-23 ENCOUNTER — LAB (OUTPATIENT)
Dept: LAB | Facility: LAB | Age: 74
End: 2024-08-23
Payer: MEDICARE

## 2024-08-23 ENCOUNTER — DOCUMENTATION (OUTPATIENT)
Dept: RESEARCH | Age: 74
End: 2024-08-23

## 2024-08-23 ENCOUNTER — OFFICE VISIT (OUTPATIENT)
Dept: CARDIAC SURGERY | Facility: CLINIC | Age: 74
End: 2024-08-23
Payer: MEDICARE

## 2024-08-23 VITALS
HEART RATE: 66 BPM | BODY MASS INDEX: 26.04 KG/M2 | OXYGEN SATURATION: 96 % | HEIGHT: 73 IN | DIASTOLIC BLOOD PRESSURE: 81 MMHG | WEIGHT: 196.5 LBS | SYSTOLIC BLOOD PRESSURE: 136 MMHG

## 2024-08-23 DIAGNOSIS — Z00.6 RESEARCH STUDY PATIENT: ICD-10-CM

## 2024-08-23 DIAGNOSIS — Z95.2 S/P AVR (AORTIC VALVE REPLACEMENT): ICD-10-CM

## 2024-08-23 DIAGNOSIS — Z95.2 S/P AORTIC VALVE REPLACEMENT: Primary | ICD-10-CM

## 2024-08-23 LAB
ALBUMIN SERPL BCP-MCNC: 4.3 G/DL (ref 3.4–5)
ALP SERPL-CCNC: 60 U/L (ref 33–136)
ALT SERPL W P-5'-P-CCNC: 23 U/L (ref 10–52)
ANION GAP SERPL CALC-SCNC: 15 MMOL/L (ref 10–20)
AST SERPL W P-5'-P-CCNC: 32 U/L (ref 9–39)
BASOPHILS # BLD AUTO: 0.04 X10*3/UL (ref 0–0.1)
BASOPHILS NFR BLD AUTO: 0.5 %
BILIRUB SERPL-MCNC: 0.5 MG/DL (ref 0–1.2)
BUN SERPL-MCNC: 34 MG/DL (ref 6–23)
CALCIUM SERPL-MCNC: 9.4 MG/DL (ref 8.6–10.6)
CHLORIDE SERPL-SCNC: 103 MMOL/L (ref 98–107)
CO2 SERPL-SCNC: 26 MMOL/L (ref 21–32)
CREAT SERPL-MCNC: 1.79 MG/DL (ref 0.5–1.3)
CREAT UR-MCNC: 77 MG/DL (ref 20–370)
EGFRCR SERPLBLD CKD-EPI 2021: 40 ML/MIN/1.73M*2
EOSINOPHIL # BLD AUTO: 0.21 X10*3/UL (ref 0–0.4)
EOSINOPHIL NFR BLD AUTO: 2.7 %
ERYTHROCYTE [DISTWIDTH] IN BLOOD BY AUTOMATED COUNT: 14.1 % (ref 11.5–14.5)
GLUCOSE SERPL-MCNC: 269 MG/DL (ref 74–99)
HCT VFR BLD AUTO: 40.8 % (ref 41–52)
HGB BLD-MCNC: 12.7 G/DL (ref 13.5–17.5)
IMM GRANULOCYTES # BLD AUTO: 0.04 X10*3/UL (ref 0–0.5)
IMM GRANULOCYTES NFR BLD AUTO: 0.5 % (ref 0–0.9)
LYMPHOCYTES # BLD AUTO: 0.98 X10*3/UL (ref 0.8–3)
LYMPHOCYTES NFR BLD AUTO: 12.8 %
MAGNESIUM SERPL-MCNC: 2.37 MG/DL (ref 1.6–2.4)
MCH RBC QN AUTO: 25.3 PG (ref 26–34)
MCHC RBC AUTO-ENTMCNC: 31.1 G/DL (ref 32–36)
MCV RBC AUTO: 81 FL (ref 80–100)
MICROALBUMIN UR-MCNC: 1732.1 MG/L
MICROALBUMIN/CREAT UR: 2249.5 UG/MG CREAT
MONOCYTES # BLD AUTO: 0.52 X10*3/UL (ref 0.05–0.8)
MONOCYTES NFR BLD AUTO: 6.8 %
NEUTROPHILS # BLD AUTO: 5.88 X10*3/UL (ref 1.6–5.5)
NEUTROPHILS NFR BLD AUTO: 76.7 %
NRBC BLD-RTO: 0 /100 WBCS (ref 0–0)
PLATELET # BLD AUTO: 178 X10*3/UL (ref 150–450)
POTASSIUM SERPL-SCNC: 4 MMOL/L (ref 3.5–5.3)
PROT SERPL-MCNC: 7.2 G/DL (ref 6.4–8.2)
RBC # BLD AUTO: 5.01 X10*6/UL (ref 4.5–5.9)
SODIUM SERPL-SCNC: 140 MMOL/L (ref 136–145)
WBC # BLD AUTO: 7.7 X10*3/UL (ref 4.4–11.3)

## 2024-08-23 PROCEDURE — 99214 OFFICE O/P EST MOD 30 MIN: CPT

## 2024-08-23 PROCEDURE — 80053 COMPREHEN METABOLIC PANEL: CPT

## 2024-08-23 PROCEDURE — 85025 COMPLETE CBC W/AUTO DIFF WBC: CPT

## 2024-08-23 PROCEDURE — 82570 ASSAY OF URINE CREATININE: CPT

## 2024-08-23 PROCEDURE — 82043 UR ALBUMIN QUANTITATIVE: CPT

## 2024-08-23 PROCEDURE — 83735 ASSAY OF MAGNESIUM: CPT

## 2024-08-23 RX ORDER — TAMSULOSIN HYDROCHLORIDE 0.4 MG/1
0.4 CAPSULE ORAL DAILY
COMMUNITY
Start: 2024-07-31

## 2024-08-23 ASSESSMENT — ENCOUNTER SYMPTOMS
DEPRESSION: 0
DECREASED APPETITE: 0
WHEEZING: 0
CHILLS: 0
OCCASIONAL FEELINGS OF UNSTEADINESS: 1
COUGH: 0
LOSS OF SENSATION IN FEET: 0
HEMOPTYSIS: 0
IRREGULAR HEARTBEAT: 0
FEVER: 0

## 2024-08-23 ASSESSMENT — PATIENT HEALTH QUESTIONNAIRE - PHQ9
SUM OF ALL RESPONSES TO PHQ9 QUESTIONS 1 AND 2: 0
1. LITTLE INTEREST OR PLEASURE IN DOING THINGS: NOT AT ALL
2. FEELING DOWN, DEPRESSED OR HOPELESS: NOT AT ALL

## 2024-08-23 ASSESSMENT — PAIN SCALES - GENERAL: PAINLEVEL: 0-NO PAIN

## 2024-08-23 ASSESSMENT — COLUMBIA-SUICIDE SEVERITY RATING SCALE - C-SSRS
6. HAVE YOU EVER DONE ANYTHING, STARTED TO DO ANYTHING, OR PREPARED TO DO ANYTHING TO END YOUR LIFE?: NO
2. HAVE YOU ACTUALLY HAD ANY THOUGHTS OF KILLING YOURSELF?: NO
1. IN THE PAST MONTH, HAVE YOU WISHED YOU WERE DEAD OR WISHED YOU COULD GO TO SLEEP AND NOT WAKE UP?: NO

## 2024-08-23 NOTE — PROGRESS NOTES
Subjective   Umair Carney is a 74 y.o. male.    Chief Complaint:  Follow-up (Aortic Valve Replacement and Renibus study)     HPI  74-year-old male presents today for his 60-day Renibus study follow-up for physical examination.  He is doing well from a surgical standpoint. He reports that his Dunn catheter was removed a couple weeks ago and he is still experiencing some occasional incontinence.  Denies chest pain, shortness of breath, lower extremity edema, dizziness, palpitations and syncopal episode.        Review of Systems   Constitutional: Negative for chills, decreased appetite and fever.   Cardiovascular:  Negative for irregular heartbeat.   Respiratory:  Negative for cough, hemoptysis and wheezing.        Objective   Constitutional:       Appearance: Normal and healthy appearance.   Pulmonary:      Effort: Pulmonary effort is normal.   Cardiovascular:      PMI at left midclavicular line. Regular rhythm.   Psychiatric:         Attention and Perception: Attention normal.         Mood and Affect: Mood normal.         Speech: Speech normal.         Behavior: Behavior is cooperative.         Thought Content: Thought content normal.         Judgment: Judgment normal.       Assessment/Plan   In summary, Mr. Carney is doing well overall. He will be starting cardiac rehab in the next couple of weeks. He is seeing a urologist for his issues with occasional incontinence. Overall his physical exam was within normal limits. He will continue to be followed by the study and cardiac surgery as necessary.    Time: 35 minutes

## 2024-08-23 NOTE — RESEARCH NOTES
Met with Mr Carney for the 60 day Renibus follow up. Doing well, no longer has foly catheter in place for retention. Labs obtained per study protocol. Questionnaire completed per study protocol. Next study visit will be at 3 months post op with a telephone call.

## 2024-09-07 NOTE — DISCHARGE SUMMARY
"Discharge Diagnosis  Aortic valve stenosis    Issues Requiring Follow-Up  PCP/Cardiologist/Surgeon/Urologist     Test Results Pending At Discharge  Pending Labs       Order Current Status    CBC Collected (06/27/24 0713)    Magnesium Collected (06/27/24 0713)    Renal Function Panel Collected (06/27/24 0713)    Surgical Pathology Exam In process            Hospital Course  Umair Carney is a 74 year old male with PMHx of HTN, HLD, Prostate cancer s/p radiation in 2016, T2DM who presented to [facility] on 6/21/2024 for planned cardiac surgery.     Patient was referred to Dr. Durant for nonrheumatic aortic valve stenosis.      OPERATION/PROCEDURE: 6/21/2024 with Usman Durant   1. Median Sternotomy   2. Replacement Aortic Valve with XL Perceval Plus  3. ALEX clip with 35mm Atricure  4. CABG x3 LIMA-LAD, SVG-CX, SVG-PDA    CTICU Course: uneventful   Transfer to LT3:   ===================    Floor Course:  - Patient was diuresed for fluid volume overload post cardiac surgery; Preop weight: 95.1kg, discharge wt: 90.2kg  /84   Pulse 74   Temp 36.3 °C (97.3 °F)   Resp 19   Ht 1.854 m (6' 1\")   Wt 97.8 kg (215 lb 9.6 oz)   SpO2 98%   BMI 28.44 kg/m²    --- Discharging patient on short course of PO Lasix   - On ASA, statin, BB, Lisinopril  by discharge    - Epicardial wires CUT on 6/27  - Telemetry at discharge SR 60's to 70's     Hx of Hypertension: Discharging patient on   -- Metoprolol tartrate BID   -- Lisinopril 20mg daily   -- Amlodipine 10mg daily     Diabetes Mellitus Type 2:   - endocrine following appreciate recs  Discharge Recommendations on 6/24/24 - Home Diabetes regimen:     Metformin 1000mg orally BID  Rybelsius - Outpatient follow up consideration for Ozempic in the light of cardioprotection as well and predictability  -- unable to discharge patient on Jardiance due to valle replaced with acute urinary retention    - 6/26: Stopped Jardiance due to valle being replaced and likely " being discharged with valle   SGLT2i tx may not be utilized in inpt setting unless the following conditions are met. Only HF Cardiology may initiate inpatient SGLT2i use.   1) pt is eating and drinking by mouth with a total daily carb intake exceeding 60g of CHO  2) pt is urinating independently of urinary catheters  3) pt can ambulate freely to the restroom in order to maintain personal hygiene  4) no current concern for UTI/genital or inguinal candidiasis  5) no plans for NPO within the next 48-72hr     Hx of prostate cancer and chemotherapy w/ hematuria   BPH:   - hx of urinary retention and hx of having to have catheter replaced s/p previous knee surgery   - continue tamsulosin: 0.8mg daily   - 6/26: Acute urinary retention; 900mL on bladder scan; straight cath x1   ---Urinary Retention in afternoon after TOV; 700mL on bladder scan   --- 18F Coudet Valle Catheter placed  --- Valle catheter clean and dry; light caroline colored urine in valle bag; patient discharged with Leg valle bag   - Will Discharge patient with valle for acute urinary retention    - Follow up with Voiding trial as an outpatient with urology with Dr. Yasmany Sosa     Chronic Kidney Disease Stage III: baseline SCr 1.5   - Creatinine at discharge: 1.42  - Discharging patient home with short course of PO Lasix       - 2v CXR done 6/26  - Postop echo done 6/24 and showed LVEF of 45%  - Cardiac rehab referral was placed  - PT recs home and low intensity therapy  - Anticipate discharge to home with homecare    On day of discharge, vital signs were stable and no acute distress was noted. All questions were answered. After VS and labs were reviewed it was determined the patient was stable for discharge.     Discharged to home with home care on 6/27  ========================    Hospital day of discharge management- spent >30 minutes coordinating the discharge and counseling/educating patient and family regarding discharge instructions.     Past Medical  History:  - Coronary artery disease    - Hyperlipidemia    - Hypertension    - Prostate cancer (Multi)      s/p radiation  - Skin cancer      removed  - Type 2 diabetes mellitus (Multi)     Past Surgical History:  - CARDIAC CATHETERIZATION      - CATARACT EXTRACTION; Bilateral    - COLONOSCOPY      - KNEE ARTHROPLASTY; Bilateral    Medications Prior to Admission  - amLODIPine (Norvasc) 10 mg tablet; Take 1 tablet (10 mg) by mouth once daily.      - aspirin 81 mg EC tablet; Take 1 tablet (81 mg) by mouth once daily.     - cholecalciferol (Vitamin D-3) 50 mcg (2,000 unit) capsule; Take 1 capsule (50 mcg) by mouth early in the morning..      - cyanocobalamin (Vitamin B-12) 1,000 mcg tablet; Take 1 tablet (1,000 mcg) by mouth once daily.     - fenofibrate (Tricor) 145 mg tablet; Take 1 tablet (145 mg) by mouth once daily.    - glimepiride (Amaryl) 2 mg tablet; TAKE 1 TABLET BY MOUTH BEFORE BREAKFAST AND 1 TABLET BEFORE DINNER       - hydrALAZINE (Apresoline) 100 mg tablet; Take 1 tablet (100 mg) by mouth 3 times a day.    - icosapent ethyL (Vascepa) 1 gram capsule; TAKE TWO CAPSULES BY MOUTH TWO TIMES A DAY AFTER MEALS       - Jardiance 25 mg; Take 1 tablet (25 mg) by mouth once daily.       - lisinopriL-hydrochlorothiazide 20-25 mg tablet; Take 1 tablet by mouth once daily at bedtime.    - metFORMIN (Glucophage) 1,000 mg tablet; TAKE ONE TABLET BY MOUTH AFTER LUNCH AND ONE TABLET AFTER DINNER      - metoprolol succinate XL (Toprol-XL) 50 mg 24 hr tablet; Take 1 tablet (50 mg) by mouth once daily at bedtime.       - potassium chloride CR 20 mEq ER tablet; Take 1 tablet (20 mEq) by mouth 2 times a day.       - rosuvastatin (Crestor) 20 mg tablet; Take 1 tablet (20 mg) by mouth once daily.       - Rybelsus 14 mg tablet tablet; TAKE 1 TABLET BY MOUTH ONCE DAILY ON AN EMPTY STOMACH.     Patient has no known allergies.    Social History  - Smoking status: Never  - Smokeless tobacco: Never  - Vaping status: Never Used  -  Alcohol use: Not Currently  - Drug use: Never     Family History:   - Mother: heart Disease   - Father: Prostate Cancer     Postop Imaging:   XR Chest 2 Views: 6/26/2024     FINDINGS:  PA and lateral radiographs of the chest were provided.  Additional PA  dual energy images were also provided.      Postsurgical changes of median sternotomy. Left atrial appendage  closure device. Cardiac valve replacement/repair.  CARDIOMEDIASTINAL SILHOUETTE:  Cardiomediastinal silhouette is stable in size and configuration.  LUNGS:  Left mid lung linear density consistent with an area of discoid  atelectasis.. Slight blunting of the right costophrenic angle. No  evidence of pneumothorax.  ABDOMEN:  No remarkable upper abdominal findings.   BONES:  No acute osseous changes.      IMPRESSION:  1.  Status post median sternotomy with medical devices as detailed  above.  2. Trace right pleural effusion with adjacent atelectasis. No  evidence of pneumothorax.         Signed by: Razia Craig 6/26/2024 10:17 AM  Dictation workstation:   [id number]     Transthoracic Echo (TTE) Complete with Contrast 6/24/2024     PHYSICIAN INTERPRETATION:  Left Ventricle: The left ventricular systolic function is mildly decreased, with a visually estimated ejection fraction of 45%. There is global hypokinesis of the left ventricle with minor regional variations. The left ventricular cavity size is normal. There is mild concentric left ventricular hypertrophy. Abnormal (paradoxical) septal motion consistent with post-operative status. Spectral Doppler shows a pseudonormal pattern of left ventricular diastolic filling.  Left Atrium: The left atrium is mildly dilated.  Right Ventricle: The right ventricle is mildly enlarged. There is mildly reduced right ventricular systolic function.  Right Atrium: The right atrium is mildly dilated.  Aortic Valve: There is a prosthetic aortic valve present. The aortic valve dimensionless index is 0.49. There is no  evidence of aortic valve regurgitation. The peak instantaneous gradient of the aortic valve is 16.6 mmHg. The mean gradient of the aortic valve is 9.0 mmHg.  Mitral Valve: The mitral valve is normal in structure. There is trace mitral valve regurgitation.  Tricuspid Valve: The tricuspid valve was not well visualized. There is trace tricuspid regurgitation.  Pulmonic Valve: The pulmonic valve is not well visualized. There is mild pulmonic valve regurgitation.  Pericardium: There is no pericardial effusion noted.  Aorta: The aortic root was not well visualized.     CONCLUSIONS:   1. The left ventricular systolic function is mildly decreased, with a visually estimated ejection fraction of 45%.   2. There is global hypokinesis of the left ventricle with minor regional variations.   3. Spectral Doppler shows a pseudonormal pattern of left ventricular diastolic filling.   4. Mildly enlarged right ventricle.   5. There is mildly reduced right ventricular systolic function.   6. Hemodynamics are consistent with normal functioning of the bioprosthetic aortic valve.   7. The left atrium is mildly dilated.   8. Abnormal septal motion consistent with post-operative status.     [id number] Mj Cody MD  Electronically signed on 6/24/2024 at 4:16:54 PM     ** Final **      Pertinent Physical Exam At Time of Discharge  Physical Exam    Physical Exam  Vitals and nursing note reviewed.   Constitutional:       General: He is not in acute distress.     Appearance: Normal appearance. He is obese. He is not ill-appearing.      Comments: Patient alert and awake sitting up in chair in no acute distress.  Wife at bedside.    HENT:      Head: Normocephalic.      Nose: Nose normal.      Mouth/Throat:      Mouth: Mucous membranes are moist.   Eyes:      Conjunctiva/sclera: Conjunctivae normal.   Cardiovascular:      Rate and Rhythm: Normal rate and regular rhythm.      Pulses: Normal pulses.      Heart sounds: Normal heart sounds. No murmur  heard.     No gallop.      Comments: Tele: SR 60's to 70's   +2 Equal and even pulses in all extremities   A/V Wires CUT 6/27.     Pulmonary:      Effort: Pulmonary effort is normal. No respiratory distress.      Breath sounds: Normal breath sounds. No wheezing.      Comments: Equal and even chest expansion; thorax symmetric  Diminished breath sounds in bilateral bases   Good inspiratory effort on RA.   Incentive spirometer 2L   Sternum Stable    Abdominal:      General: Bowel sounds are normal. There is no distension.      Palpations: Abdomen is soft.      Tenderness: There is no abdominal tenderness.      Comments: (+) BM 6/23, 6/25  Appetite good.    Genitourinary:     Comments: 18F Coudet Valle catheter in place - (placed 6/26 for acute urinary retention)   Light caroline colored urine in valle bag   Musculoskeletal:      Cervical back: Neck supple.      Right lower leg: No edema.      Left lower leg: No edema.      Comments: PRESLEY; 5/5 strength in all extremities   Ambulates independently without assistance or a gait aide.    Skin:     General: Skin is warm and dry.      Capillary Refill: Capillary refill takes less than 2 seconds.      Coloration: Skin is not jaundiced or pale.      Comments: Mid-Sternal Incision: well approximated, no s/s of infection or bleeding, no crepitus - WALLY      Right SVG: well approximated, no s/s of infection or bleeding - WALLY      Neurological:      General: No focal deficit present.      Mental Status: He is alert and oriented to person, place, and time.   Psychiatric:         Mood and Affect: Mood normal.         Behavior: Behavior normal.        Home Medications     Medication List      START taking these medications     acetaminophen 325 mg tablet; Commonly known as: Tylenol; Take 2 tablets   (650 mg) by mouth every 6 hours if needed for mild pain (1 - 3), headaches   or moderate pain (4 - 6).   furosemide 20 mg tablet; Commonly known as: Lasix; Take 1 tablet (20 mg)   by mouth once  daily for 5 days. Please take for 5 days and then stop.   lisinopril 20 mg tablet; Take 1 tablet (20 mg) by mouth once daily.;   Start taking on: June 28, 2024   metoprolol tartrate 25 mg tablet; Commonly known as: Lopressor; Take 1   tablet (25 mg) by mouth 2 times a day.   multivitamin with minerals tablet; Take 1 tablet by mouth once daily.   polyethylene glycol 17 gram packet; Commonly known as: Glycolax,   Miralax; Take 17 g by mouth once daily as needed (constipation).   tamsulosin 0.4 mg 24 hr capsule; Commonly known as: Flomax; Take 2   capsules (0.8 mg) by mouth once daily.   traMADol 50 mg tablet; Commonly known as: Ultram; Take 1 tablet (50 mg)   by mouth every 8 hours if needed for severe pain (7 - 10) for up to 5   days.     CHANGE how you take these medications     potassium chloride CR 10 mEq ER tablet; Commonly known as: Klor-Con;   Take 1 tablet (10 mEq) by mouth once daily for 5 days. Do not crush, chew,   or split.  Please take for 5 days while taking the lasix/furosemide and   then stop.; What changed: medication strength, how much to take, when to   take this, additional instructions   rosuvastatin 40 mg tablet; Commonly known as: Crestor; Take 1 tablet (40   mg) by mouth once daily at bedtime.; What changed: medication strength,   how much to take, when to take this     CONTINUE taking these medications     amLODIPine 10 mg tablet; Commonly known as: Norvasc   aspirin 81 mg EC tablet   cholecalciferol 50 mcg (2,000 unit) capsule; Commonly known as: Vitamin   D-3   cyanocobalamin 1,000 mcg tablet; Commonly known as: Vitamin B-12   fenofibrate 145 mg tablet; Commonly known as: Tricor   icosapent ethyL 1 gram capsule; Commonly known as: Vascepa   metFORMIN 1,000 mg tablet; Commonly known as: Glucophage   Rybelsus 14 mg tablet tablet; Generic drug: semaglutide     STOP taking these medications     chlorhexidine 0.12 % solution; Commonly known as: Peridex   glimepiride 2 mg tablet; Commonly known  as: Amaryl   hydrALAZINE 100 mg tablet; Commonly known as: Apresoline   Jardiance 25 mg; Generic drug: empagliflozin   lisinopriL-hydrochlorothiazide 20-25 mg tablet   metoprolol succinate XL 50 mg 24 hr tablet; Commonly known as: Toprol-XL     ASK your doctor about these medications     magnesium oxide 400 mg (241.3 mg magnesium) tablet; Commonly known as:   Mag-Ox; Take 1 tablet (400 mg) by mouth 1 time for 1 dose. Please take for   5 days while taking lasix/furosemide.; Ask about: Should I take this   medication?       Outpatient Follow-Up  Future Appointments   Date Time Provider Department Center   7/24/2024 10:30 AM Usman Durant MD [id number]       LEVI Torres-CNP   94

## 2024-09-20 ENCOUNTER — DOCUMENTATION (OUTPATIENT)
Dept: RESEARCH | Age: 74
End: 2024-09-20
Payer: MEDICARE

## 2024-09-20 NOTE — RESEARCH NOTES
Phone call made with Mr Carney for 3 month extension visit for Natalie. Patient states he is doing well, continuing attending cardiac rehab. No AE's to note. Restarted back on Metoprolol.  Questionnaire completed per study protocol.    Informed that the next visit will be at 6 months post surgery.

## 2024-12-26 ENCOUNTER — TELEPHONE (OUTPATIENT)
Dept: CARDIOLOGY | Facility: HOSPITAL | Age: 74
End: 2024-12-26
Payer: MEDICARE

## 2025-01-22 ENCOUNTER — OFFICE VISIT (OUTPATIENT)
Dept: CARDIAC SURGERY | Facility: HOSPITAL | Age: 75
End: 2025-01-22
Payer: MEDICARE

## 2025-01-22 VITALS
SYSTOLIC BLOOD PRESSURE: 127 MMHG | HEIGHT: 73 IN | HEART RATE: 75 BPM | WEIGHT: 199.2 LBS | OXYGEN SATURATION: 97 % | BODY MASS INDEX: 26.4 KG/M2 | DIASTOLIC BLOOD PRESSURE: 77 MMHG

## 2025-01-22 DIAGNOSIS — Z95.2 S/P AORTIC VALVE REPLACEMENT: Primary | ICD-10-CM

## 2025-01-22 PROCEDURE — 99214 OFFICE O/P EST MOD 30 MIN: CPT

## 2025-01-22 RX ORDER — APALUTAMIDE 240 MG/1
240 TABLET, FILM COATED ORAL DAILY
COMMUNITY

## 2025-01-22 RX ORDER — RELUGOLIX 120 MG/1
120 TABLET, FILM COATED ORAL DAILY
COMMUNITY

## 2025-01-22 ASSESSMENT — ENCOUNTER SYMPTOMS
LOSS OF SENSATION IN FEET: 1
SLEEP DISTURBANCES DUE TO BREATHING: 0
OCCASIONAL FEELINGS OF UNSTEADINESS: 0
DECREASED APPETITE: 0
CHILLS: 0
WHEEZING: 0
DYSPNEA ON EXERTION: 0
FEVER: 0
ORTHOPNEA: 0
COUGH: 0
DEPRESSION: 0

## 2025-01-22 ASSESSMENT — PATIENT HEALTH QUESTIONNAIRE - PHQ9
SUM OF ALL RESPONSES TO PHQ9 QUESTIONS 1 AND 2: 0
2. FEELING DOWN, DEPRESSED OR HOPELESS: NOT AT ALL
1. LITTLE INTEREST OR PLEASURE IN DOING THINGS: NOT AT ALL

## 2025-01-22 ASSESSMENT — PAIN SCALES - GENERAL: PAINLEVEL_OUTOF10: 0-NO PAIN

## 2025-01-22 ASSESSMENT — COLUMBIA-SUICIDE SEVERITY RATING SCALE - C-SSRS
2. HAVE YOU ACTUALLY HAD ANY THOUGHTS OF KILLING YOURSELF?: NO
6. HAVE YOU EVER DONE ANYTHING, STARTED TO DO ANYTHING, OR PREPARED TO DO ANYTHING TO END YOUR LIFE?: NO
1. IN THE PAST MONTH, HAVE YOU WISHED YOU WERE DEAD OR WISHED YOU COULD GO TO SLEEP AND NOT WAKE UP?: NO

## 2025-01-22 NOTE — PROGRESS NOTES
Subjective   Umair Carney is a 74 y.o. male.    Chief Complaint:  Follow-up ( Renibus study)    HPI  Mr. Carney is a 74 year-old male who presents today accompanied by his wife for follow up visit.  Patient is part of the ring of his study.  He is doing well from a surgical standpoint.  His wife states that she tries to encourage him to be more active.  Mr. Carney denies chest pain, shortness of breath, lower extremity edema, dizziness, palpitations and syncopal episode.  He did not have the echo that was ordered by Dr. Durant completed prior to this visit.      Review of Systems   Constitutional: Negative for chills, decreased appetite and fever.   Cardiovascular:  Negative for dyspnea on exertion, leg swelling and orthopnea.   Respiratory:  Negative for cough, sleep disturbances due to breathing and wheezing.        Objective   Constitutional:       Appearance: Normal and healthy appearance.   Pulmonary:      Effort: Pulmonary effort is normal.      Breath sounds: Normal breath sounds.   Psychiatric:         Attention and Perception: Attention normal.         Mood and Affect: Mood normal.         Speech: Speech normal.         Behavior: Behavior is cooperative.         Thought Content: Thought content normal.         Judgment: Judgment normal.     Assessment/Plan   In summary, Mr. Carney is doing well over all.  He presents today a little over his 6 month follow up visit after, undergoing standard median sternotomy CABG x 3; LIMA - LAD, SVG - PDA, SVG-PL CX, AVR with Perceval extra-large, left atrial appendage closure with 35 mm AtriCure clip by Dr. Durant on June 21, 2024.We did discuss the importance of increasing his level of activity.  I I have asked that he go ahead and schedule and complete the echo that was ordered by Dr. Durant.  We will plan on a phone call to go over results once it has been performed.    Plan:  Schedule and complete echo.  Will call patient once echo is complete.  Call with any concerns,  issues, and/or questions.    Total time: 35 minutes

## 2025-05-20 LAB
25(OH)D3 SERPL-MCNC: 40.2 NG/ML (ref 30–100)
ALBUMIN SERPL-MCNC: 4.1 G/DL (ref 3.5–5.2)
ALP SERPL-CCNC: 77 U/L (ref 40–129)
ALT SERPL-CCNC: 19 U/L (ref 0–50)
ANION GAP SERPL CALCULATED.3IONS-SCNC: 18 MMOL/L (ref 7–16)
AST SERPL-CCNC: 28 U/L (ref 0–50)
BACTERIA URNS QL MICRO: ABNORMAL
BASOPHILS # BLD: 0.03 K/UL (ref 0–0.2)
BASOPHILS NFR BLD: 1 % (ref 0–2)
BILIRUB SERPL-MCNC: 0.3 MG/DL (ref 0–1.2)
BILIRUB UR QL STRIP: NEGATIVE
BUN SERPL-MCNC: 34 MG/DL (ref 8–23)
CALCIUM SERPL-MCNC: 9.7 MG/DL (ref 8.8–10.2)
CHLORIDE SERPL-SCNC: 101 MMOL/L (ref 98–107)
CLARITY UR: CLEAR
CO2 SERPL-SCNC: 21 MMOL/L (ref 22–29)
COLOR UR: YELLOW
CREAT SERPL-MCNC: 1.9 MG/DL (ref 0.7–1.2)
CREAT UR-MCNC: 47.2 MG/DL (ref 40–278)
CREAT UR-MCNC: 47.4 MG/DL (ref 40–278)
EOSINOPHIL # BLD: 0.11 K/UL (ref 0.05–0.5)
EOSINOPHILS RELATIVE PERCENT: 2 % (ref 0–6)
ERYTHROCYTE [DISTWIDTH] IN BLOOD BY AUTOMATED COUNT: 13.3 % (ref 11.5–15)
GFR, ESTIMATED: 36 ML/MIN/1.73M2
GLUCOSE SERPL-MCNC: 258 MG/DL (ref 74–99)
GLUCOSE UR STRIP-MCNC: >=1000 MG/DL
HCT VFR BLD AUTO: 44.8 % (ref 37–54)
HGB BLD-MCNC: 15.6 G/DL (ref 12.5–16.5)
HGB UR QL STRIP.AUTO: NEGATIVE
IMM GRANULOCYTES # BLD AUTO: <0.03 K/UL (ref 0–0.58)
IMM GRANULOCYTES NFR BLD: 0 % (ref 0–5)
KETONES UR STRIP-MCNC: NEGATIVE MG/DL
LEUKOCYTE ESTERASE UR QL STRIP: NEGATIVE
LYMPHOCYTES NFR BLD: 0.84 K/UL (ref 1.5–4)
LYMPHOCYTES RELATIVE PERCENT: 18 % (ref 20–42)
MAGNESIUM SERPL-MCNC: 2.2 MG/DL (ref 1.6–2.4)
MCH RBC QN AUTO: 30.2 PG (ref 26–35)
MCHC RBC AUTO-ENTMCNC: 34.8 G/DL (ref 32–34.5)
MCV RBC AUTO: 86.8 FL (ref 80–99.9)
MICROALBUMIN UR-MCNC: 579 MG/L (ref 0–20)
MICROALBUMIN/CREAT UR-RTO: 1227 MCG/MG CREAT (ref 0–30)
MONOCYTES NFR BLD: 0.38 K/UL (ref 0.1–0.95)
MONOCYTES NFR BLD: 8 % (ref 2–12)
NEUTROPHILS NFR BLD: 71 % (ref 43–80)
NEUTS SEG NFR BLD: 3.3 K/UL (ref 1.8–7.3)
NITRITE UR QL STRIP: NEGATIVE
PH UR STRIP: 6 [PH] (ref 5–8)
PHOSPHATE SERPL-MCNC: 3.7 MG/DL (ref 2.5–4.5)
PLATELET # BLD AUTO: 153 K/UL (ref 130–450)
PMV BLD AUTO: 11.2 FL (ref 7–12)
POTASSIUM SERPL-SCNC: 4.2 MMOL/L (ref 3.5–5.1)
PROT SERPL-MCNC: 7 G/DL (ref 6.4–8.3)
PROT UR STRIP-MCNC: 100 MG/DL
PTH-INTACT SERPL-MCNC: 57 PG/ML (ref 15–65)
RBC # BLD AUTO: 5.16 M/UL (ref 3.8–5.8)
RBC #/AREA URNS HPF: ABNORMAL /HPF
SODIUM SERPL-SCNC: 140 MMOL/L (ref 136–145)
SP GR UR STRIP: 1.01 (ref 1–1.03)
TOTAL PROTEIN, URINE: 96 MG/DL (ref 0–12)
URATE SERPL-MCNC: 5.9 MG/DL (ref 3.4–7)
URINE TOTAL PROTEIN CREATININE RATIO: 2.03 (ref 0–0.2)
UROBILINOGEN UR STRIP-ACNC: 0.2 EU/DL (ref 0–1)
WBC #/AREA URNS HPF: ABNORMAL /HPF
WBC OTHER # BLD: 4.7 K/UL (ref 4.5–11.5)

## 2025-05-27 LAB
ALBUMIN SERPL-MCNC: 4.1 G/DL (ref 3.5–5.2)
ALP SERPL-CCNC: 103 U/L (ref 40–129)
ALT SERPL-CCNC: 18 U/L (ref 0–50)
ANION GAP SERPL CALCULATED.3IONS-SCNC: 15 MMOL/L (ref 7–16)
AST SERPL-CCNC: 26 U/L (ref 0–50)
BASOPHILS # BLD: 0.04 K/UL (ref 0–0.2)
BASOPHILS NFR BLD: 1 % (ref 0–2)
BILIRUB SERPL-MCNC: 0.3 MG/DL (ref 0–1.2)
BUN SERPL-MCNC: 38 MG/DL (ref 8–23)
CALCIUM SERPL-MCNC: 10 MG/DL (ref 8.8–10.2)
CHLORIDE SERPL-SCNC: 105 MMOL/L (ref 98–107)
CHOLEST SERPL-MCNC: 187 MG/DL
CO2 SERPL-SCNC: 21 MMOL/L (ref 22–29)
CREAT SERPL-MCNC: 2 MG/DL (ref 0.7–1.2)
CREAT UR-MCNC: 50.2 MG/DL (ref 40–278)
EOSINOPHIL # BLD: 0.11 K/UL (ref 0.05–0.5)
EOSINOPHILS RELATIVE PERCENT: 2 % (ref 0–6)
ERYTHROCYTE [DISTWIDTH] IN BLOOD BY AUTOMATED COUNT: 12.9 % (ref 11.5–15)
GFR, ESTIMATED: 35 ML/MIN/1.73M2
GLUCOSE SERPL-MCNC: 253 MG/DL (ref 74–99)
HBA1C MFR BLD: 7.4 % (ref 4–5.6)
HCT VFR BLD AUTO: 47.3 % (ref 37–54)
HDLC SERPL-MCNC: 30 MG/DL
HGB BLD-MCNC: 16 G/DL (ref 12.5–16.5)
IMM GRANULOCYTES # BLD AUTO: 0.03 K/UL (ref 0–0.58)
IMM GRANULOCYTES NFR BLD: 1 % (ref 0–5)
LDLC SERPL CALC-MCNC: ABNORMAL MG/DL
LYMPHOCYTES NFR BLD: 0.89 K/UL (ref 1.5–4)
LYMPHOCYTES RELATIVE PERCENT: 18 % (ref 20–42)
MCH RBC QN AUTO: 29.7 PG (ref 26–35)
MCHC RBC AUTO-ENTMCNC: 33.8 G/DL (ref 32–34.5)
MCV RBC AUTO: 87.9 FL (ref 80–99.9)
MICROALBUMIN UR-MCNC: 635 MG/L (ref 0–20)
MICROALBUMIN/CREAT UR-RTO: 1265 MCG/MG CREAT (ref 0–30)
MONOCYTES NFR BLD: 0.49 K/UL (ref 0.1–0.95)
MONOCYTES NFR BLD: 10 % (ref 2–12)
NEUTROPHILS NFR BLD: 68 % (ref 43–80)
NEUTS SEG NFR BLD: 3.35 K/UL (ref 1.8–7.3)
PLATELET # BLD AUTO: 137 K/UL (ref 130–450)
PMV BLD AUTO: 11 FL (ref 7–12)
POTASSIUM SERPL-SCNC: 4.5 MMOL/L (ref 3.5–5.1)
PROT SERPL-MCNC: 7.4 G/DL (ref 6.4–8.3)
RBC # BLD AUTO: 5.38 M/UL (ref 3.8–5.8)
SODIUM SERPL-SCNC: 141 MMOL/L (ref 136–145)
T4 FREE SERPL-MCNC: 1 NG/DL (ref 0.9–1.7)
TRIGL SERPL-MCNC: 581 MG/DL
TSH SERPL DL<=0.05 MIU/L-ACNC: 2 UIU/ML (ref 0.27–4.2)
VLDLC SERPL CALC-MCNC: ABNORMAL MG/DL
WBC OTHER # BLD: 4.9 K/UL (ref 4.5–11.5)

## (undated) DEVICE — DRESSING, ADHESIVE, ISLAND, TELFA, 4 X 14 IN

## (undated) DEVICE — MANIFOLD, 4 PORT NEPTUNE STANDARD

## (undated) DEVICE — OXYGENATOR FX 25, W/HR, ARTERIAL FILTER

## (undated) DEVICE — DRAPE, SHEET, FAN FOLDED, HALF, 44 X 58 IN, DISPOSABLE, LF, STERILE

## (undated) DEVICE — COUNTER, NEEDLE, FOAM BLOCK, POP-N-COUNT, W/BLADEGUARD, W/ADHESIVE 40 COUNT, RED

## (undated) DEVICE — CANNULA, VESSEL, FREE FLOW, BLUNT TIP, 3 MM X 5 CM

## (undated) DEVICE — PACING CABLE, EXTENSION, 12 FT BEIGE, DISPOSABLE

## (undated) DEVICE — TUBING, SMOKE EVAC, 3/8 X 10 FT

## (undated) DEVICE — TOWEL, SURGICAL, NEURO, O/R, 16 X 26, BLUE, STERILE

## (undated) DEVICE — KIT, CELL SAVER, W/COLLECTION SET, 225ML WASH SET

## (undated) DEVICE — SEALANT, HEMOSTATIC, FLOSEAL, 10 ML

## (undated) DEVICE — YANKAUER, RIGID, STRAIGHT, OPEN TIP, NO VENT

## (undated) DEVICE — MAYO TRAY, SMALL

## (undated) DEVICE — SUTURE, SURGICAL STEEL, STERNUM 7, 18 IN, KV40, SINGL-WIRE

## (undated) DEVICE — PLEDGET, PTFE, SOFT, LARGE, 3/8 X 3/16 X 1/16 IN

## (undated) DEVICE — CATHETER, THORACIC, STRAIGHT, ADULT, 28 FR, PVC

## (undated) DEVICE — DRESSING, ADHESIVE, ISLAND, TELFA, 2 X 3.75 IN, LF

## (undated) DEVICE — APPLICATOR, CHLORAPREP, W/ORANGE TINT, 26ML

## (undated) DEVICE — BONE WAX, 3.5G ABSORBABLE, OSTENE

## (undated) DEVICE — TOWEL, OR, XRAY DETECT 5 PK, WHITE, 17X26, W/DMT TAG, ST

## (undated) DEVICE — ANTIFOG, SOLUTION, FOG-OUT

## (undated) DEVICE — DRESSING, ISLAND, TELFA, 4 X 5 IN

## (undated) DEVICE — PUNCH, AORTIC 4MM

## (undated) DEVICE — COLLECTION UNIT, DRAINAGE, THORACIC, SINGLE TUBE, DRY SUCTION, ATS COMPATIBLE, OASIS 3600, LF

## (undated) DEVICE — DRAPE, INSTRUMENT, W/POUCH, STERI DRAPE, 7 X 11 IN, DISPOSABLE, STERILE

## (undated) DEVICE — SYRINGE, LUER LOCK, 12ML

## (undated) DEVICE — TISSUE ADHESIVE, PREMIERPRO EXOFIN, PRECISION PEN HV, 1.0ML

## (undated) DEVICE — CLIP, SPRING, BULLDOG, FOGARTY, SOFT JAW, 6 MM, LF

## (undated) DEVICE — SUTURE, PROLENE, 7-0, 30 IN, BV1, DA, BLUE

## (undated) DEVICE — SUMP, INTRACARDIAC, MULTI PORT, VENT TIP, PEDIATRIC, 12 FR X 30 CM

## (undated) DEVICE — CANNULA. CORONARY OSTIA, 20FR

## (undated) DEVICE — CLIPPER, SURGICAL BLADE ASSEMBLY, GENERAL PURPOSE, SINGLE USE

## (undated) DEVICE — PACING CABLE, EXTENSION, 12 FT BLUE, DISPOSABLE

## (undated) DEVICE — CANNULA, RCSP, SELF-INFLATE, SHAPEABLE STYLET, 18 MM BALLOON, 14 FR X 27 CM

## (undated) DEVICE — Device

## (undated) DEVICE — SUTURE, VICRYL, 2-0, 27 IN, CT-1, VIOLET

## (undated) DEVICE — MARKER, SKIN, RULER AND LABEL PACK, CUSTOM

## (undated) DEVICE — KIT, BLOWER / MISTER II

## (undated) DEVICE — PITCHER, GRADUATE, 32 OZ (1200CC), STERILE

## (undated) DEVICE — GEL, ULTRASOUND, AQUASONIC 100, 20 GM, STERILE

## (undated) DEVICE — BAG, DECANTER

## (undated) DEVICE — LEAD, PACING, MYOCARDIAL, BIPOLAR, TEMPORARY

## (undated) DEVICE — SUTURE, MONOCRYL, 3-0, 18 IN, PS2, UNDYED

## (undated) DEVICE — CATHETER, DRAINAGE, NASOGASTRIC, DOUBLE LUMEN, FUNNEL END, SUMP, SALEM, 18 FR, 48 IN, PVC, STERILE

## (undated) DEVICE — DEVICE, ENDOSCOPIC VESSEL HARVESTING, SAPHENA VENAPAX

## (undated) DEVICE — ELECTRODE, ELECTROSURGICAL, BLADE, INSULATED, ENT/IMA, STERILE

## (undated) DEVICE — CLOSURE SYSTEM, DERMABOND, PRINEO, 22CM, STERILE

## (undated) DEVICE — CATHETER, SUCTION, SAFE-T-VAC VALVE, COIL PACKED, 14 FR

## (undated) DEVICE — CANNULA, AVID DUAL STAGE VENOUS DRAINAGE

## (undated) DEVICE — MICROCOAGULATION TEST, ACT+ TEST CUVETTE

## (undated) DEVICE — COVER, TABLE, 44 X 75 IN, DISPOSABLE, LF, STERILE

## (undated) DEVICE — MARKER, SKIN, DUAL TIP INK W/9 LABEL AND REMOVABLE TIME OUT SLEEVE

## (undated) DEVICE — CLIP, LIGATING, W/ADHESIVE PAD, MEDIUM, TITANIUM

## (undated) DEVICE — SUTURE, ETHIBOND, XTRA, 30 IN, 0, CT-1, GREEN

## (undated) DEVICE — SYRINGE, 20 CC, LUER LOCK, MONOJECT, W/O CAP, LF

## (undated) DEVICE — TUBE SET, PNEUMOLAR HEATED, SMOKE EVACU, HIGH-FLOW

## (undated) DEVICE — DRESSING, MEPILEX, BORDER, HEEL, 8.7 X 9.1 IN

## (undated) DEVICE — PACING WIRE, 1/2 CIRCLE, 26MM NEEDLE, WHITE

## (undated) DEVICE — SUTURE, SILK, 5-0, 18 IN TF, BLACK

## (undated) DEVICE — CLIP, LIGATING, W/ADHESIVE, WIDE SLOT, SMALL, TITANIUM

## (undated) DEVICE — SUTURE, PROLENE, 3-0, 36 IN, SH, DA, BLUE

## (undated) DEVICE — ADAPTER, CORONARY, PERFUSION, Y, 34.3 CM

## (undated) DEVICE — KIT, TOURNIQUET, 7"

## (undated) DEVICE — SUTURE, PROLENE, 4-0, 36 IN, RB1, DA, BLUE

## (undated) DEVICE — FILTER, IV, BLOOD, MICROAGGREGATE, 40 MIC, RBC TRANSFUSION

## (undated) DEVICE — SUTURE, PROLENE, 5-0, 36 IN, RB1, DA, BLUE

## (undated) DEVICE — TUBING, SUCTION, CONNECTING, STERILE 0.25 X 120 IN., LF

## (undated) DEVICE — SUTURE, PROLENE, 6-0, 30 IN, RB-2, BLUE

## (undated) DEVICE — CLEANER, ELECTROSURGICAL, TIP, 5 X 5 CM, LF

## (undated) DEVICE — CANNULA, EZ GLIDE 21FR

## (undated) DEVICE — SPONGE, GAUZE, XRAY DECT, 16 PLY, 4 X 4, W/MASTER DMT,STERILE

## (undated) DEVICE — TUBING, SUCTION, CONNECTING, NON-CONDUCTIVE, SURE GRIP CONNECTORS, 3/16 X 18 IN, PVC

## (undated) DEVICE — BANDAGE, ELASTIC, ACE, ACE, DOUBLE LENGTH, 6 X 550 IN, LF

## (undated) DEVICE — TUBING PACK, OXYGENATOR, ADULT

## (undated) DEVICE — DRESSING, MEPILEX, BORDER, SACRUM, 8.7 X 9.8 IN

## (undated) DEVICE — TAPE, POLYESTER, BRAIDED, PRE-CUT 2 X 30, WHITE"

## (undated) DEVICE — SUTURE, PROLENE 4-0, TAPER POINT, SH-1 BLUE 30 INCH

## (undated) DEVICE — CONNECTOR, STRAIGHT, 0.5 X 0.375 IN

## (undated) DEVICE — TRAY, SURESTEP, URINE METER, 14FR, SILICONE

## (undated) DEVICE — GOWN, SURGICAL, SMARTGOWN, XLARGE, STERILE

## (undated) DEVICE — SYRINGE, 60 CC, IRRIGATION, BULB, CONTRO-BULB, PAPER POUCH

## (undated) DEVICE — COVER, CART, 45 X 27 X 48 IN, CLEAR

## (undated) DEVICE — PENCIL, ELECTROSURG BUTTON SWITCH

## (undated) DEVICE — KNIFE, OPHTHALMIC, SLIT, 22.5 DEG

## (undated) DEVICE — CANNULA, CARDIAC SUMP

## (undated) DEVICE — ELECTRODE, ELECTROSURGICAL, BLADE EXT 4 INCH, INSULATED

## (undated) DEVICE — CAUTERY, PENCIL, PUSH BUTTON, SMOKE EVAC, 70MM

## (undated) DEVICE — SPONGE, LAP, XRAY DECT, 18IN X 18IN, W/MASTER DMT, STERILE

## (undated) DEVICE — TUBE SET, PNEUMOCLEAR, SMOKE EVACU, HIGH-FLOW

## (undated) DEVICE — CANNULA, OSTIAL, 12FR

## (undated) DEVICE — CONNECTOR, STRAIGHT, 0.375 X 0.375 IN

## (undated) DEVICE — DRAPE, SHEET, UTILITY, NON ABSORBENT, 18 X 26 IN, LF

## (undated) DEVICE — DRAPE, SHEET, CARDIOVASCULAR, ANTIMICROBIAL, W/ANESTHESIA SCREEN, IOBAN 2, STERI DRAPE, 107 X 133 IN, DISPOSABLE, FABRIC, BLUE, STERILE